# Patient Record
Sex: MALE | Race: WHITE | NOT HISPANIC OR LATINO | Employment: OTHER | URBAN - NONMETROPOLITAN AREA
[De-identification: names, ages, dates, MRNs, and addresses within clinical notes are randomized per-mention and may not be internally consistent; named-entity substitution may affect disease eponyms.]

---

## 2021-01-01 ENCOUNTER — HOSPITAL ENCOUNTER (INPATIENT)
Facility: HOSPITAL | Age: 71
LOS: 2 days | Discharge: HOME | End: 2021-01-03
Attending: EMERGENCY MEDICINE | Admitting: INTERNAL MEDICINE

## 2021-01-01 ENCOUNTER — APPOINTMENT (OUTPATIENT)
Dept: GENERAL RADIOLOGY | Facility: HOSPITAL | Age: 71
End: 2021-01-01

## 2021-01-01 DIAGNOSIS — I21.19 ST ELEVATION MYOCARDIAL INFARCTION (STEMI) INVOLVING OTHER CORONARY ARTERY OF INFERIOR WALL (HCC): Primary | ICD-10-CM

## 2021-01-01 PROBLEM — I21.11 ST ELEVATION MYOCARDIAL INFARCTION INVOLVING RIGHT CORONARY ARTERY: Status: ACTIVE | Noted: 2021-01-01

## 2021-01-01 PROBLEM — I21.9 HEART ATTACK: Status: ACTIVE | Noted: 2021-01-01

## 2021-01-01 LAB
ALBUMIN SERPL-MCNC: 4.2 G/DL (ref 3.5–5.2)
ALBUMIN/GLOB SERPL: 1.3 G/DL
ALP SERPL-CCNC: 56 U/L (ref 39–117)
ALT SERPL W P-5'-P-CCNC: 7 U/L (ref 1–41)
AMPHET+METHAMPHET UR QL: NEGATIVE
AMPHETAMINES UR QL: NEGATIVE
ANION GAP SERPL CALCULATED.3IONS-SCNC: 14 MMOL/L (ref 5–15)
AST SERPL-CCNC: 16 U/L (ref 1–40)
BARBITURATES UR QL SCN: NEGATIVE
BASOPHILS # BLD AUTO: 0.1 10*3/MM3 (ref 0–0.2)
BASOPHILS NFR BLD AUTO: 1.1 % (ref 0–1.5)
BENZODIAZ UR QL SCN: NEGATIVE
BILIRUB SERPL-MCNC: 0.4 MG/DL (ref 0–1.2)
BUN SERPL-MCNC: 18 MG/DL (ref 8–23)
BUN/CREAT SERPL: 15.9 (ref 7–25)
BUPRENORPHINE SERPL-MCNC: NEGATIVE NG/ML
CALCIUM SPEC-SCNC: 9.5 MG/DL (ref 8.6–10.5)
CANNABINOIDS SERPL QL: NEGATIVE
CHLORIDE SERPL-SCNC: 95 MMOL/L (ref 98–107)
CO2 SERPL-SCNC: 20 MMOL/L (ref 22–29)
COCAINE UR QL: NEGATIVE
CREAT SERPL-MCNC: 1.13 MG/DL (ref 0.76–1.27)
DEPRECATED RDW RBC AUTO: 35.4 FL (ref 37–54)
EOSINOPHIL # BLD AUTO: 0.17 10*3/MM3 (ref 0–0.4)
EOSINOPHIL NFR BLD AUTO: 1.9 % (ref 0.3–6.2)
ERYTHROCYTE [DISTWIDTH] IN BLOOD BY AUTOMATED COUNT: 12.6 % (ref 12.3–15.4)
GFR SERPL CREATININE-BSD FRML MDRD: 64 ML/MIN/1.73
GFR SERPL CREATININE-BSD FRML MDRD: 78 ML/MIN/1.73
GLOBULIN UR ELPH-MCNC: 3.2 GM/DL
GLUCOSE BLDC GLUCOMTR-MCNC: 287 MG/DL (ref 70–130)
GLUCOSE BLDC GLUCOMTR-MCNC: 388 MG/DL (ref 70–130)
GLUCOSE BLDC GLUCOMTR-MCNC: 450 MG/DL (ref 70–130)
GLUCOSE BLDC GLUCOMTR-MCNC: 480 MG/DL (ref 70–130)
GLUCOSE P FAST SERPL-MCNC: 484 MG/DL (ref 74–106)
GLUCOSE SERPL-MCNC: 562 MG/DL (ref 65–99)
HCT VFR BLD AUTO: 44 % (ref 37.5–51)
HGB BLD-MCNC: 16.1 G/DL (ref 13–17.7)
HOLD SPECIMEN: NORMAL
HOLD SPECIMEN: NORMAL
IMM GRANULOCYTES # BLD AUTO: 0.09 10*3/MM3 (ref 0–0.05)
IMM GRANULOCYTES NFR BLD AUTO: 1 % (ref 0–0.5)
INR PPP: 0.9 (ref 0.91–1.09)
LYMPHOCYTES # BLD AUTO: 1.68 10*3/MM3 (ref 0.7–3.1)
LYMPHOCYTES NFR BLD AUTO: 19.2 % (ref 19.6–45.3)
MAGNESIUM SERPL-MCNC: 1.9 MG/DL (ref 1.6–2.4)
MCH RBC QN AUTO: 28.8 PG (ref 26.6–33)
MCHC RBC AUTO-ENTMCNC: 36.6 G/DL (ref 31.5–35.7)
MCV RBC AUTO: 78.6 FL (ref 79–97)
METHADONE UR QL SCN: NEGATIVE
MONOCYTES # BLD AUTO: 0.67 10*3/MM3 (ref 0.1–0.9)
MONOCYTES NFR BLD AUTO: 7.7 % (ref 5–12)
NEUTROPHILS NFR BLD AUTO: 6.04 10*3/MM3 (ref 1.7–7)
NEUTROPHILS NFR BLD AUTO: 69.1 % (ref 42.7–76)
NRBC BLD AUTO-RTO: 0 /100 WBC (ref 0–0.2)
OPIATES UR QL: POSITIVE
OXYCODONE UR QL SCN: NEGATIVE
PCP UR QL SCN: NEGATIVE
PLATELET # BLD AUTO: 241 10*3/MM3 (ref 140–450)
PMV BLD AUTO: 9.6 FL (ref 6–12)
POTASSIUM SERPL-SCNC: 4.4 MMOL/L (ref 3.5–5.2)
PROPOXYPH UR QL: NEGATIVE
PROT SERPL-MCNC: 7.4 G/DL (ref 6–8.5)
PROTHROMBIN TIME: 11.7 SECONDS (ref 11.9–14.6)
RBC # BLD AUTO: 5.6 10*6/MM3 (ref 4.14–5.8)
SARS-COV-2 RNA PNL SPEC NAA+PROBE: NOT DETECTED
SODIUM SERPL-SCNC: 129 MMOL/L (ref 136–145)
TRICYCLICS UR QL SCN: NEGATIVE
TROPONIN T SERPL-MCNC: <0.01 NG/ML (ref 0–0.03)
WBC # BLD AUTO: 8.75 10*3/MM3 (ref 3.4–10.8)
WHOLE BLOOD HOLD SPECIMEN: NORMAL
WHOLE BLOOD HOLD SPECIMEN: NORMAL

## 2021-01-01 PROCEDURE — C1725 CATH, TRANSLUMIN NON-LASER: HCPCS | Performed by: INTERNAL MEDICINE

## 2021-01-01 PROCEDURE — 02703ZZ DILATION OF CORONARY ARTERY, ONE ARTERY, PERCUTANEOUS APPROACH: ICD-10-PCS | Performed by: INTERNAL MEDICINE

## 2021-01-01 PROCEDURE — 25010000002 MIDAZOLAM HCL (PF) 5 MG/5ML SOLUTION: Performed by: INTERNAL MEDICINE

## 2021-01-01 PROCEDURE — 63710000001 INSULIN LISPRO (HUMAN) PER 5 UNITS: Performed by: INTERNAL MEDICINE

## 2021-01-01 PROCEDURE — C1769 GUIDE WIRE: HCPCS | Performed by: INTERNAL MEDICINE

## 2021-01-01 PROCEDURE — C1894 INTRO/SHEATH, NON-LASER: HCPCS | Performed by: INTERNAL MEDICINE

## 2021-01-01 PROCEDURE — C1874 STENT, COATED/COV W/DEL SYS: HCPCS | Performed by: INTERNAL MEDICINE

## 2021-01-01 PROCEDURE — C1887 CATHETER, GUIDING: HCPCS | Performed by: INTERNAL MEDICINE

## 2021-01-01 PROCEDURE — 85025 COMPLETE CBC W/AUTO DIFF WBC: CPT | Performed by: EMERGENCY MEDICINE

## 2021-01-01 PROCEDURE — 84484 ASSAY OF TROPONIN QUANT: CPT | Performed by: EMERGENCY MEDICINE

## 2021-01-01 PROCEDURE — 0 IOPAMIDOL PER 1 ML: Performed by: INTERNAL MEDICINE

## 2021-01-01 PROCEDURE — 93005 ELECTROCARDIOGRAM TRACING: CPT | Performed by: INTERNAL MEDICINE

## 2021-01-01 PROCEDURE — 25010000002 HEPARIN (PORCINE) 1000-0.9 UT/500ML-% SOLUTION: Performed by: INTERNAL MEDICINE

## 2021-01-01 PROCEDURE — 93454 CORONARY ARTERY ANGIO S&I: CPT | Performed by: INTERNAL MEDICINE

## 2021-01-01 PROCEDURE — 027034Z DILATION OF CORONARY ARTERY, ONE ARTERY WITH DRUG-ELUTING INTRALUMINAL DEVICE, PERCUTANEOUS APPROACH: ICD-10-PCS | Performed by: INTERNAL MEDICINE

## 2021-01-01 PROCEDURE — 99153 MOD SED SAME PHYS/QHP EA: CPT | Performed by: INTERNAL MEDICINE

## 2021-01-01 PROCEDURE — 99285 EMERGENCY DEPT VISIT HI MDM: CPT

## 2021-01-01 PROCEDURE — 99223 1ST HOSP IP/OBS HIGH 75: CPT | Performed by: INTERNAL MEDICINE

## 2021-01-01 PROCEDURE — 85610 PROTHROMBIN TIME: CPT | Performed by: EMERGENCY MEDICINE

## 2021-01-01 PROCEDURE — 25010000002 FENTANYL CITRATE (PF) 100 MCG/2ML SOLUTION: Performed by: INTERNAL MEDICINE

## 2021-01-01 PROCEDURE — 25010000002 ENOXAPARIN PER 10 MG: Performed by: INTERNAL MEDICINE

## 2021-01-01 PROCEDURE — 80053 COMPREHEN METABOLIC PANEL: CPT | Performed by: EMERGENCY MEDICINE

## 2021-01-01 PROCEDURE — 25010000002 DIPHENHYDRAMINE PER 50 MG: Performed by: INTERNAL MEDICINE

## 2021-01-01 PROCEDURE — 25010000002 HEPARIN (PORCINE) PER 1000 UNITS: Performed by: INTERNAL MEDICINE

## 2021-01-01 PROCEDURE — C9606 PERC D-E COR REVASC W AMI S: HCPCS | Performed by: INTERNAL MEDICINE

## 2021-01-01 PROCEDURE — 25010000002 BIVALIRUDIN TRIFLUOROACETATE 250 MG RECONSTITUTED SOLUTION: Performed by: INTERNAL MEDICINE

## 2021-01-01 PROCEDURE — 4A023N7 MEASUREMENT OF CARDIAC SAMPLING AND PRESSURE, LEFT HEART, PERCUTANEOUS APPROACH: ICD-10-PCS | Performed by: INTERNAL MEDICINE

## 2021-01-01 PROCEDURE — 93005 ELECTROCARDIOGRAM TRACING: CPT | Performed by: EMERGENCY MEDICINE

## 2021-01-01 PROCEDURE — 80306 DRUG TEST PRSMV INSTRMNT: CPT | Performed by: INTERNAL MEDICINE

## 2021-01-01 PROCEDURE — 92941 PRQ TRLML REVSC TOT OCCL AMI: CPT | Performed by: INTERNAL MEDICINE

## 2021-01-01 PROCEDURE — 82962 GLUCOSE BLOOD TEST: CPT

## 2021-01-01 PROCEDURE — 83735 ASSAY OF MAGNESIUM: CPT | Performed by: EMERGENCY MEDICINE

## 2021-01-01 PROCEDURE — 82947 ASSAY GLUCOSE BLOOD QUANT: CPT | Performed by: INTERNAL MEDICINE

## 2021-01-01 PROCEDURE — 99152 MOD SED SAME PHYS/QHP 5/>YRS: CPT | Performed by: INTERNAL MEDICINE

## 2021-01-01 PROCEDURE — 71045 X-RAY EXAM CHEST 1 VIEW: CPT

## 2021-01-01 PROCEDURE — 93010 ELECTROCARDIOGRAM REPORT: CPT | Performed by: INTERNAL MEDICINE

## 2021-01-01 PROCEDURE — 87635 SARS-COV-2 COVID-19 AMP PRB: CPT | Performed by: EMERGENCY MEDICINE

## 2021-01-01 DEVICE — EVEROLIMUS-ELUTING PLATINUM CHROMIUM CORONARY STENT SYSTEM
Type: IMPLANTABLE DEVICE | Site: CORONARY | Status: FUNCTIONAL
Brand: SYNERGY™

## 2021-01-01 DEVICE — EVEROLIMUS-ELUTING PLATINUM CHROMIUM CORONARY STENT SYSTEM
Type: IMPLANTABLE DEVICE | Status: FUNCTIONAL
Brand: SYNERGY™

## 2021-01-01 RX ORDER — ATORVASTATIN CALCIUM 40 MG/1
40 TABLET, FILM COATED ORAL NIGHTLY
Status: DISCONTINUED | OUTPATIENT
Start: 2021-01-01 | End: 2021-01-03 | Stop reason: HOSPADM

## 2021-01-01 RX ORDER — DIPHENHYDRAMINE HYDROCHLORIDE 50 MG/ML
INJECTION INTRAMUSCULAR; INTRAVENOUS AS NEEDED
Status: DISCONTINUED | OUTPATIENT
Start: 2021-01-01 | End: 2021-01-01 | Stop reason: HOSPADM

## 2021-01-01 RX ORDER — FENTANYL CITRATE 50 UG/ML
INJECTION, SOLUTION INTRAMUSCULAR; INTRAVENOUS AS NEEDED
Status: DISCONTINUED | OUTPATIENT
Start: 2021-01-01 | End: 2021-01-01 | Stop reason: HOSPADM

## 2021-01-01 RX ORDER — ONDANSETRON 2 MG/ML
4 INJECTION INTRAMUSCULAR; INTRAVENOUS EVERY 6 HOURS PRN
Status: DISCONTINUED | OUTPATIENT
Start: 2021-01-01 | End: 2021-01-03 | Stop reason: HOSPADM

## 2021-01-01 RX ORDER — SODIUM CHLORIDE 0.9 % (FLUSH) 0.9 %
10 SYRINGE (ML) INJECTION AS NEEDED
Status: DISCONTINUED | OUTPATIENT
Start: 2021-01-01 | End: 2021-01-01 | Stop reason: SDUPTHER

## 2021-01-01 RX ORDER — ONDANSETRON 4 MG/1
4 TABLET, FILM COATED ORAL EVERY 6 HOURS PRN
Status: DISCONTINUED | OUTPATIENT
Start: 2021-01-01 | End: 2021-01-03 | Stop reason: HOSPADM

## 2021-01-01 RX ORDER — HYDROCODONE BITARTRATE AND ACETAMINOPHEN 5; 325 MG/1; MG/1
1 TABLET ORAL EVERY 4 HOURS PRN
Status: DISCONTINUED | OUTPATIENT
Start: 2021-01-01 | End: 2021-01-03 | Stop reason: HOSPADM

## 2021-01-01 RX ORDER — ACETAMINOPHEN 325 MG/1
650 TABLET ORAL EVERY 4 HOURS PRN
Status: DISCONTINUED | OUTPATIENT
Start: 2021-01-01 | End: 2021-01-03 | Stop reason: HOSPADM

## 2021-01-01 RX ORDER — NITROGLYCERIN 0.4 MG/1
0.4 TABLET SUBLINGUAL
Status: DISCONTINUED | OUTPATIENT
Start: 2021-01-01 | End: 2021-01-03 | Stop reason: HOSPADM

## 2021-01-01 RX ORDER — SODIUM CHLORIDE 0.9 % (FLUSH) 0.9 %
10 SYRINGE (ML) INJECTION AS NEEDED
Status: DISCONTINUED | OUTPATIENT
Start: 2021-01-01 | End: 2021-01-03 | Stop reason: HOSPADM

## 2021-01-01 RX ORDER — SODIUM CHLORIDE 0.9 % (FLUSH) 0.9 %
10 SYRINGE (ML) INJECTION EVERY 12 HOURS SCHEDULED
Status: DISCONTINUED | OUTPATIENT
Start: 2021-01-01 | End: 2021-01-02

## 2021-01-01 RX ORDER — MORPHINE SULFATE 2 MG/ML
2 INJECTION, SOLUTION INTRAMUSCULAR; INTRAVENOUS ONCE
Status: DISCONTINUED | OUTPATIENT
Start: 2021-01-01 | End: 2021-01-02

## 2021-01-01 RX ORDER — HEPARIN SODIUM 5000 [USP'U]/ML
4000 INJECTION, SOLUTION INTRAVENOUS; SUBCUTANEOUS ONCE
Status: DISCONTINUED | OUTPATIENT
Start: 2021-01-01 | End: 2021-01-02

## 2021-01-01 RX ORDER — MIDAZOLAM HYDROCHLORIDE 1 MG/ML
INJECTION, SOLUTION INTRAMUSCULAR; INTRAVENOUS AS NEEDED
Status: DISCONTINUED | OUTPATIENT
Start: 2021-01-01 | End: 2021-01-01 | Stop reason: HOSPADM

## 2021-01-01 RX ORDER — LABETALOL HYDROCHLORIDE 5 MG/ML
10 INJECTION, SOLUTION INTRAVENOUS EVERY 6 HOURS PRN
Status: DISCONTINUED | OUTPATIENT
Start: 2021-01-01 | End: 2021-01-03 | Stop reason: HOSPADM

## 2021-01-01 RX ORDER — NICOTINE POLACRILEX 4 MG
15 LOZENGE BUCCAL
Status: DISCONTINUED | OUTPATIENT
Start: 2021-01-01 | End: 2021-01-03 | Stop reason: HOSPADM

## 2021-01-01 RX ORDER — SODIUM CHLORIDE 9 MG/ML
100 INJECTION, SOLUTION INTRAVENOUS CONTINUOUS
Status: DISPENSED | OUTPATIENT
Start: 2021-01-01 | End: 2021-01-01

## 2021-01-01 RX ORDER — HEPARIN SODIUM 200 [USP'U]/100ML
INJECTION, SOLUTION INTRAVENOUS AS NEEDED
Status: DISCONTINUED | OUTPATIENT
Start: 2021-01-01 | End: 2021-01-01 | Stop reason: HOSPADM

## 2021-01-01 RX ORDER — DEXTROSE MONOHYDRATE 25 G/50ML
25 INJECTION, SOLUTION INTRAVENOUS
Status: DISCONTINUED | OUTPATIENT
Start: 2021-01-01 | End: 2021-01-03 | Stop reason: HOSPADM

## 2021-01-01 RX ORDER — ASPIRIN 81 MG/1
81 TABLET ORAL DAILY
Status: DISCONTINUED | OUTPATIENT
Start: 2021-01-02 | End: 2021-01-03 | Stop reason: HOSPADM

## 2021-01-01 RX ORDER — ASPIRIN 81 MG/1
324 TABLET, CHEWABLE ORAL ONCE
Status: DISCONTINUED | OUTPATIENT
Start: 2021-01-01 | End: 2021-01-02

## 2021-01-01 RX ADMIN — INSULIN LISPRO 20 UNITS: 100 INJECTION, SOLUTION INTRAVENOUS; SUBCUTANEOUS at 20:32

## 2021-01-01 RX ADMIN — INSULIN LISPRO 7 UNITS: 100 INJECTION, SOLUTION INTRAVENOUS; SUBCUTANEOUS at 18:42

## 2021-01-01 RX ADMIN — ATORVASTATIN CALCIUM 40 MG: 40 TABLET, FILM COATED ORAL at 20:06

## 2021-01-01 RX ADMIN — METOPROLOL TARTRATE 25 MG: 25 TABLET, FILM COATED ORAL at 20:06

## 2021-01-01 RX ADMIN — ENOXAPARIN SODIUM 40 MG: 100 INJECTION SUBCUTANEOUS at 18:42

## 2021-01-01 RX ADMIN — TICAGRELOR 90 MG: 90 TABLET ORAL at 20:06

## 2021-01-01 NOTE — ED PROVIDER NOTES
Subjective   70-year-old male presents to the ER with complaint of chest pain, EKG transmitted to the hospital by EMS consistent with inferior wall ST elevation MI, STEMI alert called prior to patient arrival.  Patient reports onset of chest pain that began 2 hours prior to arrival.  Associated with nausea, generalized weakness and shortness of breath.  Pain has been continuous since onset.  He has no previous history of coronary disease but has a family history of coronary disease.  Patient is also former smoker.  He did receive full dose aspirin prior to arrival, also received nitroglycerin and morphine prior to arrival with minimal relief.      History provided by:  Patient      Review of Systems   All other systems reviewed and are negative.      No past medical history on file.    No Known Allergies    No past surgical history on file.    Family History   Problem Relation Age of Onset   • Coronary artery disease Mother    • Coronary artery disease Father        Social History     Tobacco Use   • Smoking status: Former Smoker   Substance and Sexual Activity   • Alcohol use: Not Currently   • Drug use: Never           Objective   Physical Exam  Vitals signs and nursing note reviewed.   Constitutional:       Appearance: He is well-developed. He is obese. He is ill-appearing.   HENT:      Head: Normocephalic and atraumatic.   Eyes:      Extraocular Movements: Extraocular movements intact.      Pupils: Pupils are equal, round, and reactive to light.   Cardiovascular:      Rate and Rhythm: Normal rate and regular rhythm.      Heart sounds: Normal heart sounds.   Pulmonary:      Effort: Tachypnea and accessory muscle usage present. No respiratory distress.      Breath sounds: Normal breath sounds.   Abdominal:      General: Bowel sounds are normal.      Palpations: Abdomen is soft.   Musculoskeletal:      Right lower leg: He exhibits no tenderness. No edema.      Left lower leg: He exhibits no tenderness. No edema.    Skin:     General: Skin is warm.      Capillary Refill: Capillary refill takes less than 2 seconds.      Coloration: Skin is pale.   Neurological:      General: No focal deficit present.      Mental Status: He is alert and oriented to person, place, and time.         ECG 12 Lead      Date/Time: 1/1/2021 3:38 PM  Performed by: Dawood Medley MD  Authorized by: Dawood Medley MD   Interpreted by physician  Comparison: compared with previous ECG   Comments: There is significant motion artifact likely from the patient having EKG obtained while in transit in the ambulance, but findings are consistent with inferior ST elevation MI.  Reciprocal depressions aVL and anterior leads.  Abnormal EKG    ECG 12 Lead      Date/Time: 1/1/2021 3:45 PM  Performed by: Dawood Medley MD  Authorized by: Dawood Medley MD   Interpreted by physician  Comparison: compared with previous ECG from 1/1/2021  Similar to previous ECG  Comments: Repeat EKG sent by EMS with less artifact, again findings consistent with inferior ST elevation MI, abnormal EKG    ECG 12 Lead      Date/Time: 1/1/2021 4:01 PM  Performed by: Dawood Medley MD  Authorized by: Dawood Medley MD   Interpreted by physician  Comparison: compared with previous ECG from 1/1/2021  Similar to previous ECG  Comments: EKG obtained when patient arrived to the emergency department.  Findings consistent with inferior ST elevation MI, abnormal EKG    Critical Care  Performed by: Dawood Medley MD  Authorized by: Dawood Medley MD                  ED Course  ED Course as of Jan 01 1618   Fri Jan 01, 2021   1615 70-year-old male with a strong family history of coronary disease and a previous history of smoking presents to the ER via EMS with an inferior ST elevation MI.  STEMI alert was called prior to the patient arrived to emergency department after we receive the initial EKG transmission.  When patient arrived, he was  well-appearing.  Diaphoretic, had ongoing pain.  Repeat EKG confirmed inferior ST elevation MI.  STEMI order set initiated, Covid swab sent and pending.    Heparin was ordered but I believe the patient was carried off to the Cath Lab by the cath team before pharmacy sent the medication down.  Patient had a very brief ER stay.    [AW]      ED Course User Index  [AW] Dawood Medley MD                                           MDM  Number of Diagnoses or Management Options  ST elevation myocardial infarction (STEMI) involving other coronary artery of inferior wall (CMS/HCC): new and requires workup     Amount and/or Complexity of Data Reviewed  Clinical lab tests: ordered  Discuss the patient with other providers: yes    Risk of Complications, Morbidity, and/or Mortality  Presenting problems: high  Diagnostic procedures: high  Management options: high    Critical Care  Total time providing critical care: < 30 minutes    Patient Progress  Patient progress: stable      Final diagnoses:   ST elevation myocardial infarction (STEMI) involving other coronary artery of inferior wall (CMS/HCC)            Dawood Medley MD  01/01/21 8309

## 2021-01-01 NOTE — H&P
Chief Complaint   Patient presents with   • Chest Pain     HPI: This is a 70-year-old male with no prior cardiac history and no past medical history, presenting with acute onset of chest pain.  Patient says that he was watching TV is abnormally developed acute onset substernal chest pain, nonradiating, associated with diaphoresis, nausea, nonradiating from the chest, severe, constant.  He has never had any heart problems before.  He does not take any medications and is not allergic to any medications.  He is a former smoker with a family history of heart disease.    Chest Pain   This is a new problem. The current episode started today. The onset quality is sudden. The problem occurs constantly. The problem has been unchanged. The pain is present in the substernal region. The pain is severe. The quality of the pain is described as pressure and heavy. The pain does not radiate. Associated symptoms include diaphoresis, malaise/fatigue, nausea and vomiting. Pertinent negatives include no abdominal pain, back pain, claudication, cough, dizziness, fever, headaches, hemoptysis, irregular heartbeat, numbness, orthopnea, palpitations, PND, shortness of breath or syncope. The pain is aggravated by nothing. He has tried nothing for the symptoms. Risk factors include male gender.   Pertinent negatives for past medical history include no CAD, no hyperlipidemia and no hypertension.     History reviewed. No pertinent past medical history. Denies any medical issues.    History reviewed. No pertinent surgical history.  Denies any past surgical history    Home Medications:  None    No Known Allergies    Social History     Tobacco Use   • Smoking status: Former Smoker   Substance Use Topics   • Alcohol use: Not Currently     Family History   Problem Relation Age of Onset   • Coronary artery disease Mother    • Coronary artery disease Father        Review of Systems   Constitution: Positive for diaphoresis and malaise/fatigue.  "Negative for chills, fever, night sweats and weight loss.   HENT: Negative for congestion and hearing loss.    Eyes: Negative for blurred vision and pain.   Cardiovascular: Positive for chest pain. Negative for claudication, dyspnea on exertion, irregular heartbeat, leg swelling, orthopnea, palpitations, paroxysmal nocturnal dyspnea and syncope.   Respiratory: Negative for cough, hemoptysis, shortness of breath and wheezing.    Endocrine: Negative for cold intolerance and heat intolerance.   Hematologic/Lymphatic: Negative for adenopathy and bleeding problem. Does not bruise/bleed easily.   Skin: Negative for color change, poor wound healing and rash.   Musculoskeletal: Negative for arthritis, back pain, myalgias and neck pain.   Gastrointestinal: Positive for nausea and vomiting. Negative for abdominal pain, change in bowel habit and heartburn.   Genitourinary: Negative for dysuria, frequency, hematuria and nocturia.   Neurological: Negative for dizziness, focal weakness, headaches, light-headedness, loss of balance and numbness.   Psychiatric/Behavioral: Negative for altered mental status, memory loss and substance abuse.   Allergic/Immunologic: Negative for hives and persistent infections.       Physical Exam:    /97   Pulse 84   Resp 22   Ht 175.9 cm (69.25\")   Wt 92.1 kg (203 lb)   SpO2 99%   BMI 29.76 kg/m²   Heart Rate:  [84-88] 84  Resp:  [18-22] 22  BP: (141-155)/(67-97) 141/97    Vitals signs reviewed.   Constitutional:       General: Awake. In acute distress.      Appearance: Normal appearance. Well-developed. Acutely ill-appearing. Not toxic-appearing or diaphoretic.      Interventions: Nasal cannula in place.   Eyes:      General: Lids are normal.      Extraocular Movements: Extraocular movements intact.      Pupils: Pupils are equal, round, and reactive to light.   HENT:      Head: Normocephalic and atraumatic.      Right Ear: External ear normal.      Left Ear: External ear normal.      " Nose: Nose normal.    Mouth/Throat:      Mouth: Mucous membranes are not pale, dry and not cyanotic.      Dentition: Abnormal dentition.      Pharynx: Uvula midline. No oropharyngeal exudate.   Neck:      Musculoskeletal: Normal range of motion and neck supple. No edema.      Thyroid: No thyroid mass or thyromegaly.      Vascular: No carotid bruit, hepatojugular reflux or JVD.      Trachea: No tracheal deviation.      Lymphadenopathy: No cervical adenopathy.   Pulmonary:      Effort: Pulmonary effort is normal. No accessory muscle usage or respiratory distress.      Breath sounds: Normal breath sounds. No wheezing. No rhonchi. No rales.   Chest:      Chest wall: Not tender to palpatation.   Cardiovascular:      Normal rate. Regular rhythm.      Murmurs: There is no murmur.      No gallop.   Pulses:     Intact distal pulses.   Edema:     Peripheral edema absent.   Abdominal:      General: Bowel sounds are normal. There is no distension or abdominal bruit.      Palpations: Abdomen is soft. There is no pulsatile midline mass.      Tenderness: There is no abdominal tenderness.   Musculoskeletal: Normal range of motion.         General: No tenderness or deformity.      Extremities: No clubbing present.  Skin:     General: Skin is warm and dry. There is no cyanosis.      Findings: No erythema or rash.   Neurological:      General: No focal deficit present.      Mental Status: Alert, oriented to person, place, and time and oriented to person, place and time.      Cranial Nerves: No cranial nerve deficit.   Psychiatric:         Attention and Perception: Attention normal.         Mood and Affect: Mood normal.         Speech: Speech normal.         Behavior: Behavior normal.         Thought Content: Thought content normal.       Diagnostic Data:    ECG: sinus rhythm with inferior ST elevation    Lab Results   Component Value Date    WBC 8.75 01/01/2021    HGB 16.1 01/01/2021    HCT 44.0 01/01/2021    MCV 78.6 (L) 01/01/2021      01/01/2021     No results found for: GLUCOSE, CALCIUM, NA, K, CO2, CL, BUN, CREATININE, EGFRIFAFRI, EGFRIFNONA, BCR, ANIONGAP    Lab Results   Component Value Date    INR 0.90 (L) 01/01/2021    PROTIME 11.7 (L) 01/01/2021     ASSESSMENT/PLAN:    1. Acute inferior STEMI    Given this patient's presentation with inferior ST elevation or ongoing chest pain, we will plan for emergency cardiac catheterization.  I have explained the procedure, risk, benefits and alternatives the patient is agreeable to proceed.  Further plans will be pending the results of the patient's cardiac catheterization.

## 2021-01-02 ENCOUNTER — APPOINTMENT (OUTPATIENT)
Dept: CARDIOLOGY | Facility: HOSPITAL | Age: 71
End: 2021-01-02

## 2021-01-02 PROBLEM — E78.1 HYPERTRIGLYCERIDEMIA: Status: ACTIVE | Noted: 2021-01-02

## 2021-01-02 PROBLEM — I25.10 CORONARY ARTERY DISEASE INVOLVING NATIVE CORONARY ARTERY OF NATIVE HEART: Status: ACTIVE | Noted: 2021-01-02

## 2021-01-02 PROBLEM — E66.9 OBESITY (BMI 30-39.9): Status: ACTIVE | Noted: 2021-01-02

## 2021-01-02 PROBLEM — I21.9 AMI (ACUTE MYOCARDIAL INFARCTION): Status: ACTIVE | Noted: 2021-01-02

## 2021-01-02 PROBLEM — E11.65 TYPE 2 DIABETES MELLITUS WITH HYPERGLYCEMIA, WITHOUT LONG-TERM CURRENT USE OF INSULIN: Status: ACTIVE | Noted: 2021-01-02

## 2021-01-02 LAB
ALBUMIN SERPL-MCNC: 3.9 G/DL (ref 3.5–5.2)
ALBUMIN/GLOB SERPL: 1.3 G/DL
ALP SERPL-CCNC: 44 U/L (ref 39–117)
ALT SERPL W P-5'-P-CCNC: 13 U/L (ref 1–41)
ANION GAP SERPL CALCULATED.3IONS-SCNC: 11 MMOL/L (ref 5–15)
AST SERPL-CCNC: 81 U/L (ref 1–40)
BH CV ECHO MEAS - AO MAX PG (FULL): 1.9 MMHG
BH CV ECHO MEAS - AO MAX PG: 8.1 MMHG
BH CV ECHO MEAS - AO MEAN PG (FULL): 1 MMHG
BH CV ECHO MEAS - AO MEAN PG: 4 MMHG
BH CV ECHO MEAS - AO ROOT AREA (BSA CORRECTED): 1.7
BH CV ECHO MEAS - AO ROOT AREA: 9.1 CM^2
BH CV ECHO MEAS - AO ROOT DIAM: 3.4 CM
BH CV ECHO MEAS - AO V2 MAX: 142 CM/SEC
BH CV ECHO MEAS - AO V2 MEAN: 96.2 CM/SEC
BH CV ECHO MEAS - AO V2 VTI: 29.8 CM
BH CV ECHO MEAS - AVA(I,A): 3.5 CM^2
BH CV ECHO MEAS - AVA(I,D): 3.5 CM^2
BH CV ECHO MEAS - AVA(V,A): 3.6 CM^2
BH CV ECHO MEAS - AVA(V,D): 3.6 CM^2
BH CV ECHO MEAS - BSA(HAYCOCK): 2.1 M^2
BH CV ECHO MEAS - BSA: 2.1 M^2
BH CV ECHO MEAS - BZI_BMI: 29.2 KILOGRAMS/M^2
BH CV ECHO MEAS - BZI_METRIC_HEIGHT: 175.3 CM
BH CV ECHO MEAS - BZI_METRIC_WEIGHT: 89.8 KG
BH CV ECHO MEAS - EDV(CUBED): 99.9 ML
BH CV ECHO MEAS - EDV(MOD-SP4): 57.5 ML
BH CV ECHO MEAS - EDV(TEICH): 99.3 ML
BH CV ECHO MEAS - EF(CUBED): 90.2 %
BH CV ECHO MEAS - EF(MOD-SP4): 62.3 %
BH CV ECHO MEAS - EF(TEICH): 84.8 %
BH CV ECHO MEAS - ESV(CUBED): 9.8 ML
BH CV ECHO MEAS - ESV(MOD-SP4): 21.7 ML
BH CV ECHO MEAS - ESV(TEICH): 15.1 ML
BH CV ECHO MEAS - FS: 53.9 %
BH CV ECHO MEAS - IVS/LVPW: 0.84
BH CV ECHO MEAS - IVSD: 1.1 CM
BH CV ECHO MEAS - LA DIMENSION: 3.5 CM
BH CV ECHO MEAS - LA/AO: 1
BH CV ECHO MEAS - LAT PEAK E' VEL: 10.1 CM/SEC
BH CV ECHO MEAS - LV DIASTOLIC VOL/BSA (35-75): 28 ML/M^2
BH CV ECHO MEAS - LV MASS(C)D: 200.4 GRAMS
BH CV ECHO MEAS - LV MASS(C)DI: 97.4 GRAMS/M^2
BH CV ECHO MEAS - LV MAX PG: 6.2 MMHG
BH CV ECHO MEAS - LV MEAN PG: 3 MMHG
BH CV ECHO MEAS - LV SYSTOLIC VOL/BSA (12-30): 10.6 ML/M^2
BH CV ECHO MEAS - LV V1 MAX: 124 CM/SEC
BH CV ECHO MEAS - LV V1 MEAN: 76.7 CM/SEC
BH CV ECHO MEAS - LV V1 VTI: 24.9 CM
BH CV ECHO MEAS - LVIDD: 4.6 CM
BH CV ECHO MEAS - LVIDS: 2.1 CM
BH CV ECHO MEAS - LVLD AP4: 7.1 CM
BH CV ECHO MEAS - LVLS AP4: 5.4 CM
BH CV ECHO MEAS - LVOT AREA (M): 4.2 CM^2
BH CV ECHO MEAS - LVOT AREA: 4.2 CM^2
BH CV ECHO MEAS - LVOT DIAM: 2.3 CM
BH CV ECHO MEAS - LVPWD: 1.3 CM
BH CV ECHO MEAS - MED PEAK E' VEL: 4.9 CM/SEC
BH CV ECHO MEAS - MV A MAX VEL: 95.1 CM/SEC
BH CV ECHO MEAS - MV DEC TIME: 0.2 SEC
BH CV ECHO MEAS - MV E MAX VEL: 81.5 CM/SEC
BH CV ECHO MEAS - MV E/A: 0.86
BH CV ECHO MEAS - RAP SYSTOLE: 5 MMHG
BH CV ECHO MEAS - RVSP: 10.6 MMHG
BH CV ECHO MEAS - SI(AO): 131.5 ML/M^2
BH CV ECHO MEAS - SI(CUBED): 43.8 ML/M^2
BH CV ECHO MEAS - SI(LVOT): 50.3 ML/M^2
BH CV ECHO MEAS - SI(MOD-SP4): 17.4 ML/M^2
BH CV ECHO MEAS - SI(TEICH): 40.9 ML/M^2
BH CV ECHO MEAS - SV(AO): 270.6 ML
BH CV ECHO MEAS - SV(CUBED): 90.1 ML
BH CV ECHO MEAS - SV(LVOT): 103.5 ML
BH CV ECHO MEAS - SV(MOD-SP4): 35.8 ML
BH CV ECHO MEAS - SV(TEICH): 84.2 ML
BH CV ECHO MEAS - TR MAX VEL: 118 CM/SEC
BH CV ECHO MEASUREMENTS AVERAGE E/E' RATIO: 10.87
BILIRUB SERPL-MCNC: 0.2 MG/DL (ref 0–1.2)
BUN SERPL-MCNC: 19 MG/DL (ref 8–23)
BUN/CREAT SERPL: 21.3 (ref 7–25)
CALCIUM SPEC-SCNC: 9.1 MG/DL (ref 8.6–10.5)
CHLORIDE SERPL-SCNC: 99 MMOL/L (ref 98–107)
CHOLEST SERPL-MCNC: 151 MG/DL (ref 0–200)
CO2 SERPL-SCNC: 26 MMOL/L (ref 22–29)
CREAT SERPL-MCNC: 0.89 MG/DL (ref 0.76–1.27)
DEPRECATED RDW RBC AUTO: 37.2 FL (ref 37–54)
ERYTHROCYTE [DISTWIDTH] IN BLOOD BY AUTOMATED COUNT: 12.9 % (ref 12.3–15.4)
GFR SERPL CREATININE-BSD FRML MDRD: 85 ML/MIN/1.73
GLOBULIN UR ELPH-MCNC: 3 GM/DL
GLUCOSE BLDC GLUCOMTR-MCNC: 229 MG/DL (ref 70–130)
GLUCOSE BLDC GLUCOMTR-MCNC: 309 MG/DL (ref 70–130)
GLUCOSE BLDC GLUCOMTR-MCNC: 354 MG/DL (ref 70–130)
GLUCOSE SERPL-MCNC: 296 MG/DL (ref 65–99)
HBA1C MFR BLD: 14.1 % (ref 4.8–5.6)
HCT VFR BLD AUTO: 42.3 % (ref 37.5–51)
HDLC SERPL-MCNC: 45 MG/DL (ref 40–60)
HGB BLD-MCNC: 15.1 G/DL (ref 13–17.7)
LDLC SERPL CALC-MCNC: 72 MG/DL (ref 0–100)
LDLC/HDLC SERPL: 1.44 {RATIO}
LEFT ATRIUM VOLUME INDEX: 19.2 ML/M2
LEFT ATRIUM VOLUME: 39.5 CM3
MCH RBC QN AUTO: 28.9 PG (ref 26.6–33)
MCHC RBC AUTO-ENTMCNC: 35.7 G/DL (ref 31.5–35.7)
MCV RBC AUTO: 81 FL (ref 79–97)
PLATELET # BLD AUTO: 243 10*3/MM3 (ref 140–450)
PMV BLD AUTO: 9.8 FL (ref 6–12)
POTASSIUM SERPL-SCNC: 4.1 MMOL/L (ref 3.5–5.2)
PROT SERPL-MCNC: 6.9 G/DL (ref 6–8.5)
QT INTERVAL: 378 MS
QT INTERVAL: 388 MS
QTC INTERVAL: 419 MS
QTC INTERVAL: 444 MS
RBC # BLD AUTO: 5.22 10*6/MM3 (ref 4.14–5.8)
SODIUM SERPL-SCNC: 136 MMOL/L (ref 136–145)
TRIGL SERPL-MCNC: 205 MG/DL (ref 0–150)
TSH SERPL DL<=0.05 MIU/L-ACNC: 1.48 UIU/ML (ref 0.27–4.2)
VLDLC SERPL-MCNC: 34 MG/DL (ref 5–40)
WBC # BLD AUTO: 12.13 10*3/MM3 (ref 3.4–10.8)

## 2021-01-02 PROCEDURE — 93306 TTE W/DOPPLER COMPLETE: CPT | Performed by: INTERNAL MEDICINE

## 2021-01-02 PROCEDURE — 84443 ASSAY THYROID STIM HORMONE: CPT | Performed by: INTERNAL MEDICINE

## 2021-01-02 PROCEDURE — 94799 UNLISTED PULMONARY SVC/PX: CPT

## 2021-01-02 PROCEDURE — 80053 COMPREHEN METABOLIC PANEL: CPT | Performed by: INTERNAL MEDICINE

## 2021-01-02 PROCEDURE — 85027 COMPLETE CBC AUTOMATED: CPT | Performed by: INTERNAL MEDICINE

## 2021-01-02 PROCEDURE — 82962 GLUCOSE BLOOD TEST: CPT

## 2021-01-02 PROCEDURE — 63710000001 INSULIN LISPRO (HUMAN) PER 5 UNITS: Performed by: INTERNAL MEDICINE

## 2021-01-02 PROCEDURE — 94640 AIRWAY INHALATION TREATMENT: CPT

## 2021-01-02 PROCEDURE — 99232 SBSQ HOSP IP/OBS MODERATE 35: CPT | Performed by: INTERNAL MEDICINE

## 2021-01-02 PROCEDURE — 80061 LIPID PANEL: CPT | Performed by: INTERNAL MEDICINE

## 2021-01-02 PROCEDURE — 93306 TTE W/DOPPLER COMPLETE: CPT

## 2021-01-02 PROCEDURE — 83036 HEMOGLOBIN GLYCOSYLATED A1C: CPT | Performed by: INTERNAL MEDICINE

## 2021-01-02 PROCEDURE — 25010000002 ENOXAPARIN PER 10 MG: Performed by: INTERNAL MEDICINE

## 2021-01-02 PROCEDURE — 63710000001 INSULIN DETEMIR PER 5 UNITS: Performed by: INTERNAL MEDICINE

## 2021-01-02 RX ORDER — LISINOPRIL 5 MG/1
5 TABLET ORAL
Status: DISCONTINUED | OUTPATIENT
Start: 2021-01-02 | End: 2021-01-03 | Stop reason: HOSPADM

## 2021-01-02 RX ORDER — NICOTINE POLACRILEX 4 MG
15 LOZENGE BUCCAL
Status: DISCONTINUED | OUTPATIENT
Start: 2021-01-02 | End: 2021-01-02 | Stop reason: SDUPTHER

## 2021-01-02 RX ORDER — IPRATROPIUM BROMIDE AND ALBUTEROL SULFATE 2.5; .5 MG/3ML; MG/3ML
3 SOLUTION RESPIRATORY (INHALATION) EVERY 4 HOURS PRN
Status: DISCONTINUED | OUTPATIENT
Start: 2021-01-02 | End: 2021-01-03 | Stop reason: HOSPADM

## 2021-01-02 RX ORDER — DEXTROSE MONOHYDRATE 25 G/50ML
25 INJECTION, SOLUTION INTRAVENOUS
Status: DISCONTINUED | OUTPATIENT
Start: 2021-01-02 | End: 2021-01-02 | Stop reason: SDUPTHER

## 2021-01-02 RX ORDER — IPRATROPIUM BROMIDE AND ALBUTEROL SULFATE 2.5; .5 MG/3ML; MG/3ML
3 SOLUTION RESPIRATORY (INHALATION)
Status: DISCONTINUED | OUTPATIENT
Start: 2021-01-02 | End: 2021-01-02

## 2021-01-02 RX ADMIN — METOPROLOL TARTRATE 25 MG: 25 TABLET, FILM COATED ORAL at 09:23

## 2021-01-02 RX ADMIN — TICAGRELOR 90 MG: 90 TABLET ORAL at 20:39

## 2021-01-02 RX ADMIN — METOPROLOL TARTRATE 25 MG: 25 TABLET, FILM COATED ORAL at 21:06

## 2021-01-02 RX ADMIN — ASPIRIN 81 MG: 81 TABLET, FILM COATED ORAL at 09:23

## 2021-01-02 RX ADMIN — INSULIN LISPRO 5 UNITS: 100 INJECTION, SOLUTION INTRAVENOUS; SUBCUTANEOUS at 17:38

## 2021-01-02 RX ADMIN — IPRATROPIUM BROMIDE AND ALBUTEROL SULFATE 3 ML: 2.5; .5 SOLUTION RESPIRATORY (INHALATION) at 10:01

## 2021-01-02 RX ADMIN — INSULIN DETEMIR 20 UNITS: 100 INJECTION, SOLUTION SUBCUTANEOUS at 20:38

## 2021-01-02 RX ADMIN — LISINOPRIL 5 MG: 5 TABLET ORAL at 09:23

## 2021-01-02 RX ADMIN — TICAGRELOR 90 MG: 90 TABLET ORAL at 09:23

## 2021-01-02 RX ADMIN — ATORVASTATIN CALCIUM 40 MG: 40 TABLET, FILM COATED ORAL at 20:39

## 2021-01-02 RX ADMIN — ENOXAPARIN SODIUM 40 MG: 100 INJECTION SUBCUTANEOUS at 20:39

## 2021-01-02 RX ADMIN — INSULIN LISPRO 6 UNITS: 100 INJECTION, SOLUTION INTRAVENOUS; SUBCUTANEOUS at 09:23

## 2021-01-02 RX ADMIN — INSULIN LISPRO 10 UNITS: 100 INJECTION, SOLUTION INTRAVENOUS; SUBCUTANEOUS at 11:23

## 2021-01-02 NOTE — CONSULTS
"    [facility]  HISTORY AND PHYSICAL    Date of Admission: 1/1/2021  Primary Care Physician: Provider, No Known    Subjective     Chief Complaint: The patient was admitted by Dr. Fitch for STEMI, hospitalist has been consulted for glucose control    History of Present Illness  70-year-old male who was admitted earlier in the day with chest pain.  The patient did not have history of cardiac disease.  He tells me that he thought his glucose was controlled, but he thinks that the holidays got away from him.  Patient tells me on exam, that his chest pain is improved.  His O2 is set slightly diminished at 90% on room air.  He denies any bowel or kidney dysfunction, and states that his kidneys have been working quite well since he got to the hospital.  He has no swelling in his lower extremities.        Review of Systems   Chest pain    Otherwise complete ROS reviewed and negative except as mentioned in the HPI.    Past Medical History: History reviewed. No pertinent past medical history.     Past Surgical History:History reviewed. No pertinent surgical history.     Social History:  reports that he has quit smoking. He does not have any smokeless tobacco history on file. He reports previous alcohol use. He reports that he does not use drugs.    Family History: family history includes Coronary artery disease in his father and mother.       Allergies:  No Known Allergies  Medications:  Prior to Admission medications    Not on File     Objective     Vital Signs: /100   Pulse 90   Temp 97.8 °F (36.6 °C) (Axillary)   Resp 12   Ht 175.9 cm (69.25\")   Wt 99.8 kg (220 lb)   SpO2 92%   BMI 32.25 kg/m²   Physical Exam  Vitals signs reviewed.   Constitutional:       Appearance: Normal appearance.   HENT:      Head: Normocephalic and atraumatic.      Nose: Nose normal. No rhinorrhea.   Eyes:      General: No scleral icterus.     Conjunctiva/sclera: Conjunctivae " normal.   Neck:      Musculoskeletal: Normal range of motion and neck supple.   Cardiovascular:      Rate and Rhythm: Normal rate and regular rhythm.      Heart sounds: Normal heart sounds.   Pulmonary:      Effort: Pulmonary effort is normal.      Breath sounds: Normal breath sounds.   Abdominal:      General: Bowel sounds are normal.      Palpations: Abdomen is soft.   Musculoskeletal:         General: No swelling or tenderness.   Skin:     General: Skin is warm and dry.   Neurological:      General: No focal deficit present.      Mental Status: He is alert.      Cranial Nerves: No cranial nerve deficit.   Psychiatric:         Mood and Affect: Mood normal.         Behavior: Behavior normal.        Results Reviewed:  Lab Results (last 24 hours)     Procedure Component Value Units Date/Time    Glucose, Fasting [[id number]]  (Abnormal) Collected: 01/01/21 1928    Specimen: Blood Updated: 01/01/21 2003     Glucose, Fasting 484 mg/dL     POC Glucose Once [[id number]]  (Abnormal) Collected: 01/01/21 1836    Specimen: Blood Updated: 01/01/21 1848     Glucose 450 mg/dL      Comment: : [id number] Ardon RebeccaMeter ID: [id number]       POC Glucose Once [[id number]]  (Abnormal) Collected: 01/01/21 1835    Specimen: Blood Updated: 01/01/21 1848     Glucose 480 mg/dL      Comment: : [id number] Ardon RebeccaMeter ID: [id number]       Magnesium [[id number]]  (Normal) Collected: 01/01/21 1606    Specimen: Blood Updated: 01/01/21 1753     Magnesium 1.9 mg/dL     Emmett Draw [[id number]] Collected: 01/01/21 1606    Specimen: Blood Updated: 01/01/21 1715    Narrative:      The following orders were created for panel order Emmett Draw.  Procedure                               Abnormality         Status                     ---------                               -----------         ------                     Light Blue Top[[id number]]                                   Final result               Green Top (Gel)[[id number]]                                   Final result               Lavender Top[[id number]]                                     Final result               Red Top[[id number]]                                          Final result                 Please view results for these tests on the individual orders.    Light Blue Top [[id number]] Collected: 01/01/21 1606    Specimen: Blood Updated: 01/01/21 1715     Extra Tube hold for add-on     Comment: Auto resulted       Green Top (Gel) [[id number]] Collected: 01/01/21 1606    Specimen: Blood Updated: 01/01/21 1715     Extra Tube Hold for add-ons.     Comment: Auto resulted.       Lavender Top [[id number]] Collected: 01/01/21 1606    Specimen: Blood Updated: 01/01/21 1715     Extra Tube hold for add-on     Comment: Auto resulted       Red Top [[id number]] Collected: 01/01/21 1606    Specimen: Blood Updated: 01/01/21 1715     Extra Tube Hold for add-ons.     Comment: Auto resulted.       Comprehensive Metabolic Panel [[id number]]  (Abnormal) Collected: 01/01/21 1606    Specimen: Blood Updated: 01/01/21 1654     Glucose 562 mg/dL      BUN 18 mg/dL      Creatinine 1.13 mg/dL      Sodium 129 mmol/L      Potassium 4.4 mmol/L      Comment: Slight hemolysis detected by analyzer. Results may be affected.        Chloride 95 mmol/L      CO2 20.0 mmol/L      Calcium 9.5 mg/dL      Total Protein 7.4 g/dL      Albumin 4.20 g/dL      ALT (SGPT) 7 U/L      AST (SGOT) 16 U/L      Comment: Slight hemolysis detected by analyzer. Results may be affected.        Alkaline Phosphatase 56 U/L      Total Bilirubin 0.4 mg/dL      eGFR Non African Amer 64 mL/min/1.73      eGFR  African Amer 78 mL/min/1.73      Globulin 3.2 gm/dL      A/G Ratio 1.3 g/dL      BUN/Creatinine Ratio 15.9     Anion Gap 14.0 mmol/L     Narrative:      GFR Normal >60  Chronic Kidney Disease <60  Kidney Failure <15      COVID PRE-OP / PRE-PROCEDURE SCREENING ORDER (NO ISOLATION) - Swab, Nasal Cavity [[id number]]  (Normal) Collected: 01/01/21  1607    Specimen: Swab from Nasal Cavity Updated: 01/01/21 1651    Narrative:      The following orders were created for panel order COVID PRE-OP / PRE-PROCEDURE SCREENING ORDER (NO ISOLATION) - Swab, Nasal Cavity.  Procedure                               Abnormality         Status                     ---------                               -----------         ------                     COVID-19,[employer] [facility]...[[id number]]  Normal              Final result                 Please view results for these tests on the individual orders.    COVID-19,[facility] IN-HOUSE,Nasal Swab No Transport Media 3-4 HR TAT - Swab, Nasal Cavity [[id number]]  (Normal) Collected: 01/01/21 1607    Specimen: Swab from Nasal Cavity Updated: 01/01/21 1651     COVID19 Not Detected    Narrative:      Fact sheet for providers: https://www.fda.gov/media/196960/download     Fact sheet for patients: https://www.fda.gov/media/689016/download    Test performed by PCR.    Troponin [[id number]]  (Normal) Collected: 01/01/21 1606    Specimen: Blood Updated: 01/01/21 1629     Troponin T <0.010 ng/mL     Narrative:      Troponin T Reference Range:  <= 0.03 ng/mL-   Negative for AMI  >0.03 ng/mL-     Abnormal for myocardial necrosis.  Clinicians would have to utilize clinical acumen, EKG, Troponin and serial changes to determine if it is an Acute Myocardial Infarction or myocardial injury due to an underlying chronic condition.       Results may be falsely decreased if patient taking Biotin.      Protime-INR [[id number]]  (Abnormal) Collected: 01/01/21 1606    Specimen: Blood Updated: 01/01/21 1619     Protime 11.7 Seconds      INR 0.90    CBC & Differential [[id number]]  (Abnormal) Collected: 01/01/21 1606    Specimen: Blood Updated: 01/01/21 1613    Narrative:      The following orders were created for panel order CBC & Differential.  Procedure                               Abnormality         Status                     ---------                                -----------         ------                     CBC Auto Differential[[id number]]        Abnormal            Final result                 Please view results for these tests on the individual orders.    CBC Auto Differential [[id number]]  (Abnormal) Collected: 01/01/21 1606    Specimen: Blood Updated: 01/01/21 1613     WBC 8.75 10*3/mm3      RBC 5.60 10*6/mm3      Hemoglobin 16.1 g/dL      Hematocrit 44.0 %      MCV 78.6 fL      MCH 28.8 pg      MCHC 36.6 g/dL      RDW 12.6 %      RDW-SD 35.4 fl      MPV 9.6 fL      Platelets 241 10*3/mm3      Neutrophil % 69.1 %      Lymphocyte % 19.2 %      Monocyte % 7.7 %      Eosinophil % 1.9 %      Basophil % 1.1 %      Immature Grans % 1.0 %      Neutrophils, Absolute 6.04 10*3/mm3      Lymphocytes, Absolute 1.68 10*3/mm3      Monocytes, Absolute 0.67 10*3/mm3      Eosinophils, Absolute 0.17 10*3/mm3      Basophils, Absolute 0.10 10*3/mm3      Immature Grans, Absolute 0.09 10*3/mm3      nRBC 0.0 /100 WBC         Imaging Results (Last 24 Hours)     Procedure Component Value Units Date/Time    XR Chest 1 View [[id number]] Collected: 01/01/21 1621     Updated: 01/01/21 1625    Narrative:      EXAMINATION: XR CHEST 1 VW-. 1/1/2021 4:21 PM CST     CHEST, ONE VIEW:     HISTORY: Acute STEMI     COMPARISON: None     A single frontal chest radiograph was obtained.     FINDINGS:     The level inspiration is relatively shallow and lung volumes diminished.     There is mild crowding of bronchovascular structures show markings  without definite parenchymal infiltrates.     The heart is within the upper limits of normal in size without evidence  of overt heart failure.     The bony structures are intact.                                     Impression:      1. Shallow inspiration with diminished lung volumes, no definite acute  cardiopulmonary process.     This report was finalized on 01/01/2021 16:22 by Dr. Vasu Ibarra MD.        I have personally reviewed and interpreted the radiology  studies and ECG obtained at time of admission.     Assessment / Plan     Assessment:   Active Hospital Problems    Diagnosis   • ST elevation myocardial infarction involving right coronary artery (CMS/HCC)   • Heart attack (CMS/HCC)     Impression:  1.  Hyperglycemia   2.  Reactive hyponatremia  3.  STEMI, status post stent placement  4.  Family history of heart disease    Plan:   1.  A1c in the morning  2.  Sliding scale insulin with Accu-Cheks   3.  Labs in the morning   4.  Adjust home medications according to A1c      Code Status: Full     I discussed the patient's findings and my recommendations with the patient    Estimated length of stay 2 to 3 days    Patient seen and examined by me on 1/1/2021    Electronically signed by Romero Tena DO, 01/01/21, 20:59 CST.

## 2021-01-02 NOTE — PLAN OF CARE
Goal Outcome Evaluation:  Plan of Care Reviewed With: patient  Progress: improving  Outcome Summary: Pt transferred from CCU to  today. R radial CDI. VSS. Echo done, results pending. Denies pain. Safety maintained.

## 2021-01-02 NOTE — PLAN OF CARE
Goal Outcome Evaluation:  Plan of Care Reviewed With: patient  Progress: no change  Outcome Summary: Pt with new dx of DM2. HbA1c is 14.1. Pt was not in his room at time of RD visit. Left DM diet handouts on bedside table. Will follow up with pt to provide diet education and answer questions. He is ordered a C.CHO, cardiac diet. He has consumed 50% of 1 meal. No weight hx available to determine weight loss. Current weights vary and may be inaccurate. Will monitor and instruct as appropriate.

## 2021-01-02 NOTE — PLAN OF CARE
Goal Outcome Evaluation:     Progress: improving   Pt admitted on 1/1 with STEMI. HC done to R radial w/ 2 stents places. Cath site CDI, TR band removed @ 0330 with no drainage. Pt had no c/o chest pain during night. O2 at 2L NC sats WNL. Pulses good. Safety maintained, will cont to monitor and contact MD with any questions.

## 2021-01-02 NOTE — PROGRESS NOTES
[facility] Medicine Services  INPATIENT PROGRESS NOTE    Patient Name: Aren Beltrán  Date of Admission: 1/1/2021  Today's Date: 01/02/21  Length of Stay: 1  Primary Care Physician: Provider, No Known    Subjective   Chief Complaint: chest pain   HPI     Patient seen and examined at bedside.      Patient new diagnosis of diabetes.  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  Patient will need a primary care physician as well as significant insulin teaching.      Patient had bilateral wheezing on examination, recommended nursing give incentive/flutter and requested a breahting treatment from the respiratory therapist.      Review of Systems   Constitutional: Positive for fatigue. Negative for activity change, chills and fever.   Respiratory: Positive for wheezing. Negative for cough and shortness of breath.    Cardiovascular: Negative for chest pain and palpitations.   Gastrointestinal: Negative for abdominal distention and abdominal pain.   Neurological: Negative for weakness.        All pertinent negatives and positives are as above. All other systems have been reviewed and are negative unless otherwise stated.     Objective    Temp:  [96 °F (35.6 °C)-97.9 °F (36.6 °C)] 97.3 °F (36.3 °C)  Heart Rate:  [57-96] 66  Resp:  [12-22] 15  BP: (126-167)/() 134/71  Physical Exam  Vitals signs and nursing note reviewed.   Constitutional:       Appearance: He is obese.   HENT:      Head: Normocephalic and atraumatic.      Mouth/Throat:      Mouth: Mucous membranes are moist.   Eyes:      General: No scleral icterus.     Conjunctiva/sclera: Conjunctivae normal.   Cardiovascular:      Rate and Rhythm: Normal rate and regular rhythm.      Pulses: Normal pulses.   Pulmonary:      Effort: Pulmonary effort is normal. No respiratory distress.      Breath sounds: No stridor. Wheezing present. No rhonchi or  rales.   Abdominal:      General: Abdomen is flat. Bowel sounds are normal. There is no distension.      Palpations: Abdomen is soft. There is no mass.      Tenderness: There is no abdominal tenderness.      Hernia: No hernia is present.      Comments: Protuberant    Musculoskeletal: Normal range of motion.      Right lower leg: No edema.      Left lower leg: No edema.   Skin:     General: Skin is warm and dry.      Coloration: Skin is not jaundiced or pale.      Findings: No erythema.   Neurological:      General: No focal deficit present.      Mental Status: He is alert and oriented to person, place, and time.   Psychiatric:         Mood and Affect: Mood normal.         Behavior: Behavior normal.             Results Review:  I have reviewed the labs, radiology results, and diagnostic studies.    Laboratory Data:   Results from last 7 days   Lab Units 01/02/21  0307 01/01/21  1606   WBC 10*3/mm3 12.13* 8.75   HEMOGLOBIN g/dL 15.1 16.1   HEMATOCRIT % 42.3 44.0   PLATELETS 10*3/mm3 243 241        Results from last 7 days   Lab Units 01/02/21  0307 01/01/21  1606   SODIUM mmol/L 136 129*   POTASSIUM mmol/L 4.1 4.4   CHLORIDE mmol/L 99 95*   CO2 mmol/L 26.0 20.0*   BUN mg/dL 19 18   CREATININE mg/dL 0.89 1.13   CALCIUM mg/dL 9.1 9.5   BILIRUBIN mg/dL 0.2 0.4   ALK PHOS U/L 44 56   ALT (SGPT) U/L 13 7   AST (SGOT) U/L 81* 16   GLUCOSE mg/dL 296* 562*       Culture Data:   No results found for: BLOODCX, URINECX, WOUNDCX, MRSACX, RESPCX, STOOLCX    Radiology Data:   Imaging Results (Last 24 Hours)     Procedure Component Value Units Date/Time    XR Chest 1 View [[id number]] Collected: 01/01/21 1621     Updated: 01/01/21 1625    Narrative:      EXAMINATION: XR CHEST 1 VW-. 1/1/2021 4:21 PM CST     CHEST, ONE VIEW:     HISTORY: Acute STEMI     COMPARISON: None     A single frontal chest radiograph was obtained.     FINDINGS:     The level inspiration is relatively shallow and lung volumes diminished.     There is mild  crowding of bronchovascular structures show markings  without definite parenchymal infiltrates.     The heart is within the upper limits of normal in size without evidence  of overt heart failure.     The bony structures are intact.                                     Impression:      1. Shallow inspiration with diminished lung volumes, no definite acute  cardiopulmonary process.     This report was finalized on 01/01/2021 16:22 by Dr. Vasu Ibarra MD.          I have reviewed the patient's current medications.     Assessment/Plan     Active Hospital Problems    Diagnosis   • **ST elevation myocardial infarction involving right coronary artery (CMS/HCC)   • Obesity (BMI 30-39.9)   • Type 2 diabetes mellitus with hyperglycemia, without long-term current use of insulin (CMS/HCC)   • Coronary artery disease involving native coronary artery of native heart   • Hypertriglyceridemia       Plan:  Patient presented on 1/1 to the ER with STEMI.  Patient noted to have ST-elevations on inferior leads upon presentation.  Dr. Fitch took the patient to cath labs and it showed distal right coronary artery and PL branch occlusions in which successful PCI with STEFFI was performed.  PCI with STEFFI to ostial RCA was also successfully performed.  There was noted to have moderate-severe stenosis in the LAD and left circumflex systems as well.  Upon presentation, patient was noted to have a hemoglobin A1c of 14.1.  Hospital medicine was consulted for diabetes management.      1.  Continue DAPT, atorvastatin, metoprolol and lisinopril per cardiology  2.  Echocardiogram pending  3.  Lovenox for VTE PPx  4.  Hemoglobin A1c 14.10; Start levemir 20 units qhs; increase intensity of insulin will give 6 units of insulin x 1 to get blood glucose under better control  5.  Diabetes educator.  Patient will need to be discharge on insulin and will need insulin teaching.  Nutrition consult  6.  Counseled on diet and weight loss   7.  Cardiac rehab per  cardiology   8.  PRN ana, incentive spirometer, flutter   9.  Carbohydrate consistent and cardiac diet                 Discharge Planning: I expect the patient to be discharged per attending physician    Electronically signed by Mauro Rojo MD, 01/02/21, 09:15 CST.

## 2021-01-03 VITALS
RESPIRATION RATE: 16 BRPM | TEMPERATURE: 98.6 F | HEIGHT: 69 IN | DIASTOLIC BLOOD PRESSURE: 69 MMHG | WEIGHT: 198.41 LBS | OXYGEN SATURATION: 98 % | HEART RATE: 59 BPM | BODY MASS INDEX: 29.39 KG/M2 | SYSTOLIC BLOOD PRESSURE: 102 MMHG

## 2021-01-03 PROBLEM — I50.32 CHRONIC DIASTOLIC CHF (CONGESTIVE HEART FAILURE): Status: ACTIVE | Noted: 2021-01-03

## 2021-01-03 LAB
ANION GAP SERPL CALCULATED.3IONS-SCNC: 10 MMOL/L (ref 5–15)
BUN SERPL-MCNC: 22 MG/DL (ref 8–23)
BUN/CREAT SERPL: 23.4 (ref 7–25)
CALCIUM SPEC-SCNC: 9.2 MG/DL (ref 8.6–10.5)
CHLORIDE SERPL-SCNC: 101 MMOL/L (ref 98–107)
CO2 SERPL-SCNC: 26 MMOL/L (ref 22–29)
CREAT SERPL-MCNC: 0.94 MG/DL (ref 0.76–1.27)
DEPRECATED RDW RBC AUTO: 39.4 FL (ref 37–54)
ERYTHROCYTE [DISTWIDTH] IN BLOOD BY AUTOMATED COUNT: 13.2 % (ref 12.3–15.4)
GFR SERPL CREATININE-BSD FRML MDRD: 80 ML/MIN/1.73
GLUCOSE BLDC GLUCOMTR-MCNC: 210 MG/DL (ref 70–130)
GLUCOSE BLDC GLUCOMTR-MCNC: 227 MG/DL (ref 70–130)
GLUCOSE BLDC GLUCOMTR-MCNC: 253 MG/DL (ref 70–130)
GLUCOSE SERPL-MCNC: 262 MG/DL (ref 65–99)
HCT VFR BLD AUTO: 43.1 % (ref 37.5–51)
HGB BLD-MCNC: 14.5 G/DL (ref 13–17.7)
MCH RBC QN AUTO: 27.9 PG (ref 26.6–33)
MCHC RBC AUTO-ENTMCNC: 33.6 G/DL (ref 31.5–35.7)
MCV RBC AUTO: 83 FL (ref 79–97)
PLATELET # BLD AUTO: 224 10*3/MM3 (ref 140–450)
PMV BLD AUTO: 10 FL (ref 6–12)
POTASSIUM SERPL-SCNC: 3.9 MMOL/L (ref 3.5–5.2)
RBC # BLD AUTO: 5.19 10*6/MM3 (ref 4.14–5.8)
SODIUM SERPL-SCNC: 137 MMOL/L (ref 136–145)
WBC # BLD AUTO: 9.26 10*3/MM3 (ref 3.4–10.8)

## 2021-01-03 PROCEDURE — 63710000001 INSULIN LISPRO (HUMAN) PER 5 UNITS: Performed by: INTERNAL MEDICINE

## 2021-01-03 PROCEDURE — 85027 COMPLETE CBC AUTOMATED: CPT | Performed by: INTERNAL MEDICINE

## 2021-01-03 PROCEDURE — 94799 UNLISTED PULMONARY SVC/PX: CPT

## 2021-01-03 PROCEDURE — 63710000001 INSULIN REGULAR HUMAN PER 5 UNITS: Performed by: INTERNAL MEDICINE

## 2021-01-03 PROCEDURE — 82962 GLUCOSE BLOOD TEST: CPT

## 2021-01-03 PROCEDURE — 99238 HOSP IP/OBS DSCHRG MGMT 30/<: CPT | Performed by: INTERNAL MEDICINE

## 2021-01-03 PROCEDURE — 80048 BASIC METABOLIC PNL TOTAL CA: CPT | Performed by: INTERNAL MEDICINE

## 2021-01-03 RX ORDER — NITROGLYCERIN 0.4 MG/1
0.4 TABLET SUBLINGUAL
Qty: 25 TABLET | Refills: 12 | Status: SHIPPED | OUTPATIENT
Start: 2021-01-03 | End: 2022-03-11 | Stop reason: SDUPTHER

## 2021-01-03 RX ORDER — ASPIRIN 81 MG/1
81 TABLET ORAL DAILY
Qty: 30 TABLET | Refills: 11 | Status: SHIPPED | OUTPATIENT
Start: 2021-01-04

## 2021-01-03 RX ORDER — ASPIRIN 81 MG/1
81 TABLET ORAL DAILY
Qty: 30 TABLET | Refills: 11 | Status: CANCELLED | OUTPATIENT
Start: 2021-01-04

## 2021-01-03 RX ORDER — ATORVASTATIN CALCIUM 40 MG/1
40 TABLET, FILM COATED ORAL NIGHTLY
Qty: 30 TABLET | Refills: 3 | Status: SHIPPED | OUTPATIENT
Start: 2021-01-03 | End: 2021-04-12 | Stop reason: SDUPTHER

## 2021-01-03 RX ORDER — LISINOPRIL 5 MG/1
5 TABLET ORAL
Qty: 30 TABLET | Refills: 3 | Status: SHIPPED | OUTPATIENT
Start: 2021-01-04 | End: 2021-04-12 | Stop reason: SDUPTHER

## 2021-01-03 RX ORDER — PERPHENAZINE 16 MG/1
TABLET, FILM COATED ORAL
Qty: 200 EACH | Refills: 0 | Status: SHIPPED | OUTPATIENT
Start: 2021-01-03 | End: 2021-01-28 | Stop reason: SDUPTHER

## 2021-01-03 RX ADMIN — INSULIN LISPRO 2 UNITS: 100 INJECTION, SOLUTION INTRAVENOUS; SUBCUTANEOUS at 11:11

## 2021-01-03 RX ADMIN — LISINOPRIL 5 MG: 5 TABLET ORAL at 08:18

## 2021-01-03 RX ADMIN — INSULIN HUMAN 10 UNITS: 100 INJECTION, SOLUTION PARENTERAL at 08:18

## 2021-01-03 RX ADMIN — TICAGRELOR 90 MG: 90 TABLET ORAL at 08:18

## 2021-01-03 RX ADMIN — METOPROLOL TARTRATE 25 MG: 25 TABLET, FILM COATED ORAL at 08:18

## 2021-01-03 RX ADMIN — INSULIN LISPRO 7 UNITS: 100 INJECTION, SOLUTION INTRAVENOUS; SUBCUTANEOUS at 11:11

## 2021-01-03 RX ADMIN — ASPIRIN 81 MG: 81 TABLET, FILM COATED ORAL at 08:18

## 2021-01-03 NOTE — PROGRESS NOTES
[facility] Medicine Services  INPATIENT PROGRESS NOTE    Patient Name: Aren Beltrán  Date of Admission: 1/1/2021  Today's Date: 01/03/21  Length of Stay: 2  Primary Care Physician: Provider, No Known    Subjective   Chief Complaint: chest pain   HPI     Patient seen and examined at bedside.     Patient being discharged today.  Went over diabetes instructions with patient and he has educational materials provided to him.       Review of Systems   Constitutional: Negative for activity change, chills, diaphoresis and fatigue.   Respiratory: Negative for shortness of breath and wheezing.    Cardiovascular: Negative for chest pain and palpitations.   Gastrointestinal: Negative for abdominal distention, abdominal pain, diarrhea, nausea and vomiting.        All pertinent negatives and positives are as above. All other systems have been reviewed and are negative unless otherwise stated.     Objective    Temp:  [97.9 °F (36.6 °C)-98.6 °F (37 °C)] 98.5 °F (36.9 °C)  Heart Rate:  [63-71] 69  Resp:  [18] 18  BP: (102-126)/(69-96) 118/71  Physical Exam  Vitals signs and nursing note reviewed.   Constitutional:       Appearance: He is obese.   HENT:      Head: Normocephalic and atraumatic.      Mouth/Throat:      Mouth: Mucous membranes are moist.   Eyes:      General: No scleral icterus.     Conjunctiva/sclera: Conjunctivae normal.   Cardiovascular:      Rate and Rhythm: Normal rate and regular rhythm.      Pulses: Normal pulses.   Pulmonary:      Effort: Pulmonary effort is normal. No respiratory distress.      Breath sounds: No stridor. No wheezing, rhonchi or rales.   Abdominal:      General: Abdomen is flat. Bowel sounds are normal. There is no distension.      Palpations: Abdomen is soft. There is no mass.      Tenderness: There is no abdominal tenderness.      Hernia: No hernia is present.      Comments: Protuberant    Musculoskeletal: Normal range of motion.      Right lower leg: No  edema.      Left lower leg: No edema.   Skin:     General: Skin is warm and dry.      Coloration: Skin is not jaundiced or pale.      Findings: No erythema.   Neurological:      General: No focal deficit present.      Mental Status: He is alert and oriented to person, place, and time.   Psychiatric:         Mood and Affect: Mood normal.         Behavior: Behavior normal.             Results Review:  I have reviewed the labs, radiology results, and diagnostic studies.    Laboratory Data:   Results from last 7 days   Lab Units 01/03/21  0424 01/02/21  0307 01/01/21  1606   WBC 10*3/mm3 9.26 12.13* 8.75   HEMOGLOBIN g/dL 14.5 15.1 16.1   HEMATOCRIT % 43.1 42.3 44.0   PLATELETS 10*3/mm3 224 243 241        Results from last 7 days   Lab Units 01/03/21  0424 01/02/21  0307 01/01/21  1606   SODIUM mmol/L 137 136 129*   POTASSIUM mmol/L 3.9 4.1 4.4   CHLORIDE mmol/L 101 99 95*   CO2 mmol/L 26.0 26.0 20.0*   BUN mg/dL 22 19 18   CREATININE mg/dL 0.94 0.89 1.13   CALCIUM mg/dL 9.2 9.1 9.5   BILIRUBIN mg/dL  --  0.2 0.4   ALK PHOS U/L  --  44 56   ALT (SGPT) U/L  --  13 7   AST (SGOT) U/L  --  81* 16   GLUCOSE mg/dL 262* 296* 562*       Culture Data:   No results found for: BLOODCX, URINECX, WOUNDCX, MRSACX, RESPCX, STOOLCX    Radiology Data:   Imaging Results (Last 24 Hours)     ** No results found for the last 24 hours. **          I have reviewed the patient's current medications.     Assessment/Plan     Active Hospital Problems    Diagnosis   • **ST elevation myocardial infarction involving right coronary artery (CMS/[facility])   • Chronic diastolic CHF (congestive heart failure) (CMS/[facility])   • Obesity (BMI 30-39.9)   • Type 2 diabetes mellitus with hyperglycemia, without long-term current use of insulin (CMS/[facility])   • Coronary artery disease involving native coronary artery of native heart   • Hypertriglyceridemia       Plan:  Patient presented on 1/1 to the ER with STEMI.  Patient noted to have ST-elevations on inferior leads  upon presentation.  Dr. Fitch took the patient to cath labs and it showed distal right coronary artery and PL branch occlusions in which successful PCI with STEFFI was performed.  PCI with STEFFI to ostial RCA was also successfully performed.  There was noted to have moderate-severe stenosis in the LAD and left circumflex systems as well.  Upon presentation, patient was noted to have a hemoglobin A1c of 14.1.  Hospital medicine was consulted for diabetes management.      1.  Continue DAPT, atorvastatin, metoprolol and lisinopril per cardiology  2.  Echocardiogram pending  3.  Lovenox for VTE PPx  4.  Hemoglobin A1c 14.10; Levemir 30 units qhs; increase intensity of insulin will give 6 units of insulin x 1 to get blood glucose under better control  5.  Diabetes educator.  Patient will need to be discharge on insulin and will need insulin teaching.  Nutrition consult  6.  Counseled on diet and weight loss   7.  Cardiac rehab per cardiology   8.  PRN duonebs, incentive spirometer, flutter   9.  Carbohydrate consistent and cardiac diet         Final recommendations:  1.  Levemir (or glargine whichever is insurance preference) 30 units at bedtime.    2.  Lispro (or whatever short-acting is insurance preference) 7 units before meals (3 times daily).  For blood sugars greater than 150, for every 50 over 150 add 1 extra unit.  (example, if blood sugar is 277 you will add 3 units to the 7 for a total of 10)  3.  Metformin to start 1/4 500 mg twice a day.  Titrate up to 1000 mg twice daily in 4 weeks.  4.  PCP as soon as possible  5.  Patient will need to measure his blood sugar before every meal and at bedtime.  It is important to eat after you take your insulin.   6.  Patient will need insulin syringes and glucometer with testing supplies.  7.  Provide patient with a blood sugar log      PCP ASAP.    Will sign off.  Orders placed in current medications as to the recommendations.          Discharge Planning: I expect the  patient to be discharged per attending physician    Electronically signed by Mauro Rojo MD, 01/03/21, 10:35 CST.

## 2021-01-03 NOTE — PLAN OF CARE
Goal Outcome Evaluation:  Plan of Care Reviewed With: patient  Progress: no change  Outcome Summary: Pt denied any pain tonight.  Educated on diabetic diet and pt voiced understanding. VSS. Pt on room air and saturating well.  [id number] on telemetry.  Denies any needs at this time. Will continue to monitor.

## 2021-01-04 ENCOUNTER — READMISSION MANAGEMENT (OUTPATIENT)
Dept: CALL CENTER | Facility: HOSPITAL | Age: 71
End: 2021-01-04

## 2021-01-04 NOTE — PAYOR COMM NOTE
"1/4/21 [facility] [phone]  [phone]      PAT[facility].    AUTH [id number]            '''Neftaly Beltrán (70 y.o. Male)     Date of Birth Social Security Number Address Home Phone MRN    1950  [address]  [place] [address] [phone] [id number]    Spiritism Marital Status          Other Unknown       Admission Date Admission Type Admitting Provider Attending Provider Department, Room/Bed    1/1/21 Emergency Marko Fitch MD  [facility] 4B, 431/1    Discharge Date Discharge Disposition Discharge Destination        1/3/2021 Home or Self Care              Attending Provider: (none)   Allergies: No Known Allergies    Isolation: None   Infection: None   Code Status: Prior    Ht: 175.9 cm (69.25\")   Wt: 90 kg (198 lb 6.6 oz)    Admission Cmt: None   Principal Problem: ST elevation myocardial infarction involving right coronary artery (CMS/HCC) [I21.11]                 Active Insurance as of 1/1/2021     Primary Coverage     Payor Plan Insurance Group Employer/Plan Group    ANTHEM MEDICARE REPLACEMENT ANTHEM MEDICARE ADVANTAGE [id number]     Payor Plan Address Payor Plan Phone Number Payor Plan Fax Number Effective Dates    [address] [phone]  1/1/2021 - None Entered    [facility] [id number]       Subscriber Name Subscriber Birth Date Member ID       NEFTALY BELTRÁN 1950 [id number]                 Emergency Contacts      (Rel.) Home Phone Work Phone Mobile Phone    contact, no [phone] -- --         Department Encounter #   1/1/2021  3:59 PM Bh Pad 4b [id number]   Dawood Medley MD   Physician   Emergency Medicine   ED Provider Notes   Signed   Date of Service:  01/01/21 1609   Creation Time:  01/01/21 1609         Procedure Orders   Critical Care [[id number]] ordered by Dawood Medley MD   ECG 12 Lead [[id number]] ordered by Dawood Medley MD   ECG 12 " Lead [[id number]] ordered by Dawood Medley MD   ECG 12 Lead [[id number]] ordered by Dawood Medley MD          Signed        Expand All Collapse All      Show:Clear all  [x]Manual[x]Template[]Copied    Added by:  [x]Dawood Medley MD    []Hover for details     Subjective      70-year-old male presents to the ER with complaint of chest pain, EKG transmitted to the hospital by EMS consistent with inferior wall ST elevation MI, STEMI alert called prior to patient arrival.  Patient reports onset of chest pain that began 2 hours prior to arrival.  Associated with nausea, generalized weakness and shortness of breath.  Pain has been continuous since onset.  He has no previous history of coronary disease but has a family history of coronary disease.  Patient is also former smoker.  He did receive full dose aspirin prior to arrival, also received nitroglycerin and morphine prior to arrival with minimal relief.        History provided by:  Patient                 Course as of Jan 01 1618 Fri Jan 01, 2021   1615 70-year-old male with a strong family history of coronary disease and a previous history of smoking presents to the ER via EMS with an inferior ST elevation MI.  STEMI alert was called prior to the patient arrived to emergency department after we receive the initial EKG transmission.  When patient arrived, he was well-appearing.  Diaphoretic, had ongoing pain.  Repeat EKG confirmed inferior ST elevation MI.  STEMI order set initiated, Covid swab sent and pending.    Heparin was ordered but I believe the patient was carried off to the Cath Lab by the cath team before pharmacy sent the medication down.  Patient had a very brief ER stay.    [AW]   MDM  Number of Diagnoses or Management Options  ST elevation myocardial infarction (STEMI) involving other coronary artery of inferior wall (CMS/HCC): new and requires workup     Amount and/or Complexity of Data Reviewed  Clinical lab tests:  ordered  Discuss the patient with other providers: yes     Risk of Complications, Morbidity, and/or Mortality  Presenting problems: high  Diagnostic procedures: high  Management options: high   Critical Care  Total time providing critical care: < 30 minutes     Patient Progress  Patient progress: stable        Final diagnoses:   ST elevation myocardial infarction (STEMI) involving other coronary artery of inferior wall (CMS/HCC)               Dawood Medley MD  01/01/21 1619  Encounter Information     Department Encounter #   1/1/2021  3:59 PM  Pad 4b [id number]      Marko Fitch MD   Physician   Cardiology   H&P   Signed   Date of Service:  01/01/21 1609   Creation Time:  01/01/21 1609            Signed        Expand All Collapse All      Show:Clear all  [x]Manual[x]Template[]Copied    Added by:  [x]Marko Fitch MD    [][facility] for details     Chief Complaint   Patient presents with   • Chest Pain      HPI: This is a 70-year-old male with no prior cardiac history and no past medical history, presenting with acute onset of chest pain.  Patient says that he was watching TV is abnormally developed acute onset substernal chest pain, nonradiating, associated with diaphoresis, nausea, nonradiating from the chest, severe, constant.  He has never had any heart problems before.  He does not take any medications and is not allergic to any medications.  He is a former smoker with a family history of heart disease.     Chest Pain   This is a new problem. The current episode started today. The onset quality is sudden. The problem occurs constantly. The problem has been unchanged. The pain is present in the substernal region. The pain is severe. The quality of the pain is described as pressure and heavy. The pain does not radiate. Associated symptoms include diaphoresis, malaise/fatigue, nausea and vomiting. Pertinent negatives include no abdominal pain, back pain, claudication, cough, dizziness,  "fever, headaches, hemoptysis, irregular heartbeat, numbness, orthopnea, palpitations, PND, shortness of breath or syncope. The pain is aggravated by nothing. He has tried nothing for the symptoms. Risk factors include male gender.   Pertinent negatives for past medical history include no CAD, no hyperlipidemia and no hypertension.      Medical History   History reviewed. No pertinent past medical history.    Denies any medical issues.                      Review of Systems   Constitution: Positive for diaphoresis and malaise/fatigue. Negative for chills, fever, night sweats and weight loss.   HENT: Negative for congestion and hearing loss.    Eyes: Negative for blurred vision and pain.   Cardiovascular: Positive for chest pain. Negative for claudication, dyspnea on exertion, irregular heartbeat, leg swelling, orthopnea, palpitations, paroxysmal nocturnal dyspnea and syncope.   Respiratory: Negative for cough, hemoptysis, shortness of breath and wheezing.    Endocrine: Negative for cold intolerance and heat intolerance.   Hematologic/Lymphatic: Negative for adenopathy and bleeding problem. Does not bruise/bleed easily.   Skin: Negative for color change, poor wound healing and rash.   Musculoskeletal: Negative for arthritis, back pain, myalgias and neck pain.   Gastrointestinal: Positive for nausea and vomiting. Negative for abdominal pain, change in bowel habit and heartburn.   Genitourinary: Negative for dysuria, frequency, hematuria and nocturia.   Neurological: Negative for dizziness, focal weakness, headaches, light-headedness, loss of balance and numbness.   Psychiatric/Behavioral: Negative for altered mental status, memory loss and substance abuse.   Allergic/Immunologic: Negative for hives and persistent infections.           Physical Exam:     /97   Pulse 84   Resp 22   Ht 175.9 cm (69.25\")   Wt 92.1 kg (203 lb)   SpO2 99%   BMI 29.76 kg/m²   Heart Rate:  [84-88] 84  Resp:  [18-22] 22  BP: " (141-155)/(67-97) 141/97     Vitals signs reviewed.   Constitutional:       General: Awake. In acute distress.      Appearance: Normal appearance. Well-developed. Acutely ill-appearing. Not toxic-appearing or diaphoretic.      Interventions: Nasal cannula in place.   Eyes:      General: Lids are normal.      Extraocular Movements: Extraocular movements intact.      Pupils: Pupils are equal, round, and reactive to light.   HENT:      Head: Normocephalic and atraumatic.      Right Ear: External ear normal.      Left Ear: External ear normal.      Nose: Nose normal.    Mouth/Throat:      Mouth: Mucous membranes are not pale, dry and not cyanotic.      Dentition: Abnormal dentition.      Pharynx: Uvula midline. No oropharyngeal exudate.   Neck:      Musculoskeletal: Normal range of motion and neck supple. No edema.      Thyroid: No thyroid mass or thyromegaly.      Vascular: No carotid bruit, hepatojugular reflux or JVD.      Trachea: No tracheal deviation.      Lymphadenopathy: No cervical adenopathy.   Pulmonary:      Effort:Chest:      Chest wall: Not tender to palpatation.   Cardiovascular:      Normal rate. Regular rhythm.      Murmurs: There is no murmur.      No gallop.   Pulses:     Intact distal pulses.   Edema:     Peripheral edema absent.   Abdominal:      General: Bowel sounds are normal. There is no distension or abdominal bruit.      Palpations: Abdomen is soft. There is no pulsatile midline mass.      Tenderness: There is no abdominal tenderness.   Musculoskeletal: Normal range of motion.         General: No tenderness or deformity.      Extremities: No clubbing present.  Skin:     General: Skin is warm and dry. There is no cyanosis.      Findings: No erythema or rash.   Neurological:      General: No focal deficit present.      Mental Status: Alert, oriented to person, place, and time and oriented to person, place and time.      Cranial Nerves: No cranial nerve deficit.   Psychiatric:         Attention  and Perception: Attention normal.         Mood and Affect: Mood normal.         Speech: Speech normal.         Behavior: Behavior normal.         Thought Content: Thought content normal.      sinus rhythm with inferior ST elevation           Lab Results   Component Value Date     WBC 8.75 01/01/2021     HGB 16.1 01/01/2021     HCT 44.0 01/01/2021     MCV 78.6 (L) 01/01/2021      01/01/2021      No results found for: GLUCOSE, CALCIUM, NA, K, CO2, CL, BUN, CREATININE, EGFRIFAFRI, EGFRIFNONA, BCR, ANIONGAP           Lab Results   Component Value Date     INR 0.90 (L) 01/01/2021     PROTIME 11.7 (L) 01/01/2021      ASSESSMENT/PLAN:     1. Acute inferior STEMI     Given this patient's presentation with inferior ST elevation or ongoing chest pain, we will plan for emergency cardiac catheterization.  I have explained the procedure, risk, benefits and alternatives the patient is agreeable to proceed.  Further plans will be pending the results of the patient's cardiac catheterization.                  Department Encounter #   1/1/2021  3:59 PM [address] [id number]   Romero Tena DO   Physician   Medicine   Consults   Signed   Date of Service:  01/01/21 2059   Creation Time:  01/01/21 2059            Signed        Expand All Collapse All      Show:Clear all  [x]Manual[x]Template[]Copied    Added by:  [x]Romero Tena DO    []Alexander for details       [facility] Medicine Services  HISTORY AND PHYSICAL     Date of Admission: 1/1/2021  Primary Care Physician: Provider, No Known     Subjective      Chief Complaint: The patient was admitted by Dr. Fitch for STEMI, hospitalist has been consulted for glucose control     History of Present Illness  70-year-old male who was admitted earlier in the day with chest pain.  The patient did not have history of cardiac disease.  He tells me that he thought his glucose was controlled, but he thinks that the holidays got away from him.   Patient tells me on exam, that his chest pain is improved.  His O2 is set slightly diminished at 90% on room air.  He denies any bowel or kidney dysfunction, and states that his kidneys have been working quite well since he got to the hospital.  He has no swelling in his lower extremities.           Review of Systems   Chest pain     Otherwi                   Procedure Component Value Units Date/Time     Glucose, Fasting [[id number]]  (Abnormal) Collected: 01/01/21 1928     Specimen: Blood Updated: 01/01/21 2003       Glucose, Fasting 484 mg/dL       POC Glucose Once [[id number]]  (Abnormal) Collected: 01/01/21 1836     Specimen: Blood Updated: 01/01/21 1848       Glucose 450 mg/dL         Comment: : [id number] Ardon RebeccaMeter ID: [id number]        POC Glucose Once [[id number]]  (Abnormal) Collected: 01/01/21 1835     Specimen: Blood Updated: 01/01/21 1848       Glucose 480 mg/dL         Comment: : [id number] Ardon RebeccaMeter ID: [id number]        Magnesium [[id number]]  (Normal) Collected: 01/01/21 1606     Specimen: Blood Updated: 01/01/21 1753       Magnesium 1.9 mg/dL       [place] Draw [[id number]]      Auto resulted.          Comprehensive Metabolic Panel [[id number]]  (Abnormal) Collected: 01/01/21 1606     Specimen: Blood Updated: 01/01/21 1654       Glucose 562 mg/dL         BUN 18 mg/dL         Creatinine 1.13 mg/dL         Sodium 129 mmol/L         Potassium 4.4 mmol/L         Comment: Slight hemolysis detected by analyzer. Results may be affected.          Chloride 95 mmol/L         CO2 20.0 mmol/L         Calcium 9.5 mg/dL         Total Protein 7.4 g/dL         Albumin 4.20 g/dL         ALT (SGPT) 7 U/L         AST (SGOT) 16 U/L         Comment: Slight hemolysis detected by analyzer. Results may be affected.          Alkaline Phosphatase 56 U/L         Total Bilirubin 0.4 mg/dL         eGFR Non African Amer 64 mL/min/1.73         eGFR  African Amer 78 mL/min/1.73         Globulin 3.2 gm/dL          A/G Ratio 1.3 g/dL         BUN/Creatinine Ratio 15.9       Anion Gap 14.0 mmol/L       Narrative:       GFR Normal >60           Protime-INR [[id number]]  (Abnormal) Collected: 01/01/21 1606     Specimen: Blood Updated: 01/01/21 1619       Protime 11.7 Seconds         INR 0.90     CBC & Differential [[id number]]  (Abnormal) Collected: 01/01/21 1606     Specimen: Blood Updated: 01/01/21 1613     Narrative:       The following orders were created for panel order CBC & Differential.  Procedure                               Abnormality         Status                     ---------                               -----------         ------                     CBC Auto Differential[[id number]]        Abnormal            Final result                  Please view results for these tests on the individual orders.     CBC Auto Differential [[id number]]  (Abnormal) Collected: 01/01/21 1606     Specimen: Blood Updated: 01/01/21 1613       WBC 8.75 10*3/mm3         RBC 5.60 10*6/mm3         Hemoglobin 16.1 g/dL         Hematocrit 44.0 %         MCV 78.6 fL         MCH 28.8 pg         MCHC 36.6 g/dL         RDW 12.6 %         RDW-SD 35.4 fl         MPV 9.6 fL         Platelets 241 10*3/mm3         Neutrophil % 69.1 %         Lymphocyte % 19.2 %         Monocyte % 7.7 %         Eosinophil % 1.9 %         Basophil % 1.1 %         Immature Grans % 1.0 %         Neutrophils, Absolute 6.04 10*3/mm3         Lymphocytes, Absolute 1.68 10*3/mm3         Monocytes, Absolute 0.67 10*3/mm3         Eosinophils, Absolute 0.17 10*3/mm3         Basophils, Absolute 0.10 10*3/mm3         Immature Grans, Absolute 0.09 10*3/mm3         nRBC 0.0 /100 WBC             Narrative:        EXAMINATION: XR CHEST 1 VW-. 1/1/2021 4:21 PM CST     CHEST, ONE VIEW:     HISTORY: Acute STEMI     COMPARISON: None     A single frontal chest radiograph was obtained.     FINDINGS:     The level inspiration is relatively shallow and lung volumes diminished.     There is  mild crowding of bronchovascular structures show markings  without definite parenchymal infiltrates.     The heart is within the upper limits of normal in size without evidence  of overt heart failure.     The bony structures are intact.                                      Impression:       1. Shallow inspiration with diminished lung volumes, no definite acute  cardiopulmonary process.     This report was finalized on 2021 16:22 by Dr. Vasu Ibarra MD.          I have personally reviewed and interpreted the radiology studies and ECG obtained at time of admission.   Assessment:       Active Hospital Problems     Diagnosis   • ST elevation myocardial infarction involving right coronary artery (CMS/HCC)   • Heart attack (CMS/HCC)      Impression:  1.  Hyperglycemia   2.  Reactive hyponatremia  3.  STEMI, status post stent placement  4.  Family history of heart disease     Plan:   1.  A1c in the morning  2.  Sliding scale insulin with Accu-Cheks   3.  Labs in the morning   4.  Adjust home medications according to A1c        Code Status: Full     I discussed the patient's findings and my recommendations with the patient     Estimated length of stay 2 to 3 days     Patient seen and examined by me on 2021     Electronically signed by Romero Tena DO, 21, 20:59 CST.     [facility] CATH LAB  [address] [place] [id number] [phone]             Aren Beltrán  Cardiac Catheterization/Vascular Study  Order# [id number]  Reading physician: Marko Fitch MD Ordering physician: Marko Fitch MD Study date: 21    Procedures    Left Heart Cath   Percutaneous Coronary Intervention      Patient Information    Patient Name   Aren Beltrán MRN   [id number] Sex   Male  (Age)   1950 (70 y.o.)   Race Ethnicity Encounter Category   White or  Not  or  Emergency   PACS Images     Show images for Cardiac Catheterization/Vascular Study     Printable Vessel Diagram    Printable Vessel Diagram      Physicians    Panel Physicians Referring Physician Case Authorizing Physician   Marko Fitch MD (Primary)     Pre Procedure Diagnosis    STEMI       Conclusion    Date of Procedure: 01/01/21     Procedures Performed:     1. Selective coronary angiography  2. Acute myocardial infarction percutaneous coronary intervention to the distal and proximal right coronary artery  3. Administration and monitoring of conscious sedation during the procedure     Risk, Benefits, and Alternatives discussed with the patient and/or family.  Plan is for moderate sedation, and the patient agrees to proceed with the procedure.  An immediate assessment was done prior to the administration of moderate sedation.     Indication: Acute inferior STEMI     Access: 5/6 Nicaraguan right radial artery; unable to access both the left and right femoral arterial system due to presumed iliac occlusions and inability to pass the guidewire  Diagnostic Catheters: 6 Nicaraguan JR4, 6 Nicaraguan JL 3.5     Procedural Details: The patient was brought emergently to the cardiac Cath Lab and the cardiac catheterization as well as PCI procedures were explained to the patient, including the perceived risks, benefits and alternatives.  He was agreeable to proceed.  Initially, the skin over the patient's bilateral inguinal area was prepped and draped in the usual sterile fashion.  Lidocaine was administered for anesthesia over the right femoral artery and an 18-gauge Cook needle was used to puncture the right femoral artery, returning pulsatile blood, however there was inability to pass the guidewire past the iliac artery.  This access site was aborted.  Manual pressure was used to achieve hemostasis.  Attention turned to the left femoral area where the same technique was used and the wire once again was unable to pass the iliac artery.  At this point, the skin overlying the patient's right wrist was prepped  and draped in the usual sterile fashion. Timeout had been taken to confirm the correct patient and procedure. IV Versed and fentanyl were used to achieve conscious sedation. Lidocaine was administered for local anesthesia over the right radial artery.  Modified Seldinger technique was then used to place a 5/6 Sammarinese sheath in the right radial artery.  Diagnostic coronary angiography of the right coronary artery was performed with a 6 Sammarinese JR4 guide, confirming an occluded PL branch from the right coronary artery, and the decision was made to proceed with PCI of this vessel. The details of this procedure will be described separately below.  Following the intervention to the right coronary artery, a 6 Sammarinese JL 3.5 catheter was used to perform left coronary angiography.  Following this, there were some ST changes noted on the telemetry monitor, therefore the JR4 guide catheter was once again used to perform a final angiogram of the right coronary artery, revealing the artery to be patent and stents to be widely patent.  Upon completion of all procedures, a TR Band was used to achieve hemostasis. The patient tolerated the procedures well. There were no immediate complications.  During the procedure, I supervised the administration and monitoring of conscious sedation.  This included monitoring of the patient's overall status, hemodynamics and oximetry.  The patient received the first dose of IV sedation at 4:23 PM and I left the room at 5:27 PM, accounting for a total of 64 minutes of face-to-face encounter time with the patient in the cath lab today.     Results:     Selective Coronary Angiography:     Left Main Coronary Artery: The left main coronary artery arises from the left coronary cusp and bifurcates into the LAD and left circumflex arteries.  There is mild disease noted in left main coronary artery.     Left Anterior Descending Artery: The left anterior descending artery arises from the distal left main  coronary artery and supplies a first diagonal branch that appears to have a 60% stenosis in the midportion of this branch.  Following this, there is an additional small caliber diffusely diseased diagonal branch.  Distal to this branch, the LAD is calcified and diffusely diseased with irregularities up to 60 to 70%.  The LAD then terminates by wrapping the apex.      Left Circumflex: The left circumflex artery arises from the distal left main artery and supplies a first obtuse marginal branch which appears to have a hazy calcified 50-60% stenosis in the proximal third of the vessel.  At the takeoff of this branch, there is also at least a 40-50% stenosis.  The circumflex, after the takeoff of the first obtuse marginal branch has an 80% stenosis.  There is then a second obtuse marginal branch which has 2 terminal branches.  These are small caliber branches, however there is a 60-70% stenosis at the bifurcation of the terminal branches.  There is also an AV groove vessel that has mild disease.     Right Coronary Artery: The right coronary artery arises from the right coronary cusp and has a severe stenosis in the proximal third of the vessel, followed by an aneurysmal segment in the midportion the vessel as well as ectasia distally, along with heavy calcification throughout the course the vessel.  The posterior descending artery is free of any significant disease, however there is an acute occlusion of the PL branch system just after the posterior descending artery takeoff.     Percutaneous Coronary Intervention to the right coronary artery:      It should be noted there was difficulty in obtaining access, therefore there was some delay in the CI of the culprit vessel due to difficulty with access.  In any event, a JR4 guide was used for the procedure.  Angiomax bolus and infusion administered for adjunctive anticoagulation during the procedure.  Initially, a prowater wire was attempted to navigate this very tortuous  heavily calcified vessel.  The wire ultimately was passed across the occluded segment and balloon angioplasty was performed with a 2.0 mm balloon.  Following this balloon deployment, upon removal of the balloon, the wire did retract across the lesion and was unable to be advanced further.  Therefore, this was exchanged for a whisper extra-support wire.  Additional balloon inflations were made across the occluded segment.  Also, it was noted there was considerable difficulty even in delivering the 2.0 mm balloon due to a severe stenosis at the ostial portion of the right coronary artery, therefore this same balloon was used to dilate this lesion.  Following this, a guide liner was used for extra-support and a 2.25 x 16 mm Synergy stent was advanced and deployed.  Following this, an NC 2.5 x 12 mm balloon was used to post dilate the stent and was also used to dilate the ostial portion of the right coronary artery.  Following this balloon inflation, the result at the stent site shows minimal residual stenosis, no evidence of dissection, perforation, distal embolization.  The proximal edge of the stent does extend just to the level of the PDA, however the PDA remains widely patent.  Attention then turned to the lesion in the proximal right coronary artery.  A 3.0 x 12 mm Synergy stent was deployed across this lesion and this was postdilated with a 3.5 x 12 mm noncompliant balloon at high atmospheric pressures.  Following the stent deployment, and post dilation, there is minimal residual stenosis at the stent site, however there is noted to be heavy calcification in this area.  There is no evidence of dissection, perforation, distal embolization.     Total contrast: 230 mL     Fluoroscopy time: 17: 55 min     X-ray exposure: 1062 mGy     Estimated Blood Loss: 10 cc     Specimens: None     Complications: None     Impression:  1.  Acute inferior STEMI secondary to distal right coronary artery/PL branch occlusion, now  status post successful PCI to the culprit vessel with synergy drug-eluting stent, also with successful PCI to the ostial right coronary artery as outlined above with synergy drug-eluting stent.  2.  Moderate to severe stenoses in the left anterior descending artery and left circumflex systems as outlined above.     Plan:  1. Routine post procedure orders, including 2 hours bedrest, gentle IV fluids.  2. Aspirin 81 mg daily indefinitely, Brilinta 90 mg p.o. twice daily.  3.  Angiomax for an additional 2 hours then discontinue.  4.  Initiate high intensity statin therapy, beta-blocker therapy.  Check hemoglobin A1c, lipid panel.  5.  Echocardiogram tomorrow to evaluate left ventricular systolic function.  6.  Cardiac rehab referral at discharge.         Time Under Physician Observation    Name Panel   Marko Fitch MD Panel 1   Role: Primary   Time Period: 1/1/2021 1623 - 1/1/2021 1727    Total Sedation Time    Moderate sedation event time was not documented.      Vessel Access    A 6 fr sheath was successfully inserted using micropuncture technique Sheath insertion delayed.    Sheath Disposition    Hemostasis started on the right radial artery. R-Band was used in achieving hemostasis. Radial compression device applied to vessel. Hemostasis achieved successfully.    Stent Inflated     Event Details User   5:02 PM Stent Inflated Stnt Cornry Synergy Bio Mr [id number] - First balloon inflation max pressure = 11 alex. First balloon inflation duration = 20 seconds.  RW   5:10 PM Stent Inflated Stnt Cornry Synergy Bio Mr 3x12mm - First balloon inflation max pressure = 16 alex. First balloon inflation duration = 20 seconds.  RW   Balloon Inflated     Event Details User   4:45 PM Balloon Inflated Baln Ptca Emerge Mr 2x12mm - First balloon inflation max pressure = 6 alex. First balloon inflation duration = 13 seconds.  RW   4:46 PM Balloon Inflated Baln Ptca Emerge Mr 2x12mm - First balloon inflation max pressure = 10  "alex. First balloon inflation duration = 16 seconds.  RW   4:57 PM Balloon Inflated No Supply Selected - First balloon inflation max pressure = 14 alex. First balloon inflation duration = 20 seconds.  RW   4:58 PM Balloon Inflated Baln Ptca Emerge Mr 2x12mm - First balloon inflation max pressure = 14 alex. First balloon inflation duration = 20 seconds.  RW   5:06 PM Balloon Inflated Baln Trek Nc Rx 2.5x12mm - First balloon inflation max pressure = 12 alex. First balloon inflation duration = 25 seconds.  RW   5:14 PM Balloon Inflated Baln Trek Nc Rx 3.5x12mm - First balloon inflation max pressure = 12 alex. First balloon inflation duration = 15 seconds.  RW   5:19 PM Balloon Inflated Baln Trek Nc Rx 3.25x8mm - First balloon inflation max pressure = 18 alex. First balloon inflation duration = 20 seconds.  RW    Radiation     Event   5:31 PM Radiation Tracking   Details: Panel 1: Marko Fitch MD   Cumulative Air Kerma (mGy) = 1062.000; Fluoro Time (min) = 17.9; DAP (mGy-cm2) = 79912.000   User: RW      Total Contrast    Medication Name Total Dose   iopamidol (ISOVUE-370) 76 % injection 230 mL      Patient Hx Of Height, Weight, and Vitals    Height Weight BSA (Calculated - sq m) BMI (kg/m2) Pulse BP   175.9 cm (69.25\") 92.1 kg (203 lb) 2.08 sq meters 29.82 84 141/97   Medications  (Filter: Administrations occurring from 01/01/21 0000 to 01/01/21 1754)  (important)  Continuous medications are totaled by the amount administered until 01/01/21 1754.   Medication Rate/Dose/Volume Action  Date Time    heparin infusion 2 units/mL in 0.9% NaCl (mL) 1,000 mL Given 01/01/21 1618    Total dose as of 01/01/21 1754         1,000 mL         heparin infusion 2 units/mL in 0.9% NaCl (mL) 500 mL Given 01/01/21 1618    Total dose as of 01/01/21 1754         500 mL         lidocaine (cardiac) (XYLOCAINE) injection (mL) 10 mL Given 01/01/21 1624    Total dose as of 01/01/21 1754 10 mL Given 1626    24 mL 4 mL Given 1632    verapamil " (ISOPTIN) 2,500 mcg, nitroglycerin (TRIDIL) 200 mcg, heparin (porcine) 3,000 Units radial artery injection  Given 01/01/21 1635    Total dose as of 01/01/21 1754         Cannot be calculated         bivalirudin (ANGIOMAX) bolus from bag (mL) 13.5 mL Given 01/01/21 1639    Total dose as of 01/01/21 1754         13.5 mL         bivalirudin (ANGIOMAX) 250 mg in 50 mL NS infusion (mg/kg/hr) 1.75 mg/kg/hr - 32.2 mL/hr New Bag 01/01/21 1641    Dosing weight:  92.1 kg 1.75 mg/kg/hr - 32.2 mL/hr New Bag 1726    Total dose as of 01/01/21 1754         Cannot be calculated         diphenhydrAMINE (BENADRYL) injection (mg) 25 mg Given 01/01/21 1621    Total dose as of 01/01/21 1754         25 mg         fentaNYL citrate (PF) (SUBLIMAZE) injection (mcg) 25 mcg Given 01/01/21 1622    Total dose as of 01/01/21 1754 25 mcg Given 1628    125 mcg 25 mcg Given 1633     25 mcg Given 1653     25 mcg Given 1707    Midazolam HCl (PF) (VERSED) injection (mg) 1 mg Given 01/01/21 1622    Total dose as of 01/01/21 1754 1 mg Given 1628    6 mg 1 mg Given 1633     1 mg Given 1653     1 mg Given 1658     1 mg Given 1709    ticagrelor (BRILINTA) tablet (mg) 180 mg Given 01/01/21 1730    Total dose as of 01/01/21 1754         180 mg         iopamidol (ISOVUE-370) 76 % injection (mL) 230 mL Given 01/01/21 1731    Total dose as of 01/01/21 1754         230 mL         Supplies    Name ID Temporary Type Charge Code Description Charge Code Quantity   SOL IRR NACL 0.9PCT BT 1000ML 650 No Supply   1   DEV TORQ GW HOT/PINK 3509 No Supply HC OR SUPPLY SHELL 272/ 823933 1   SOL NACL INJ 0.9PCT 50ML 3528 No Supply   2   SOLIDIFIER LIQUI LOC PLUS 2000CC 4423 No Supply   1   KT VLV HEMO MAP ACC PLS LG/BORE MTL/INTRO 4529 No Supply HC OR SUPPLY SHELL 272/ 988445 1   CATH DIAG INFINITI M/ PK 6FR 4583 No Diagnostic Catheter HC OR SUPPLY SHELL 272/NO CPT 365541 1   XI CO2/O2 NASL A/ 5117 No Supply HC OR SUPPLY SHELL 270/NO CPT 193314 1   KT HEART  MANIFLD LT CUST [facility] 5834 No Supply HC OR SUPPLY SHELL 272/NO CPT 992650 1   TRY SKINPREP SCRB CHG 77026 No Supply   1   GW HITORQUE/WHISPER/ES STR .014 8Q149NY 72764 No Guidewire HC OR SUPPLY SHELL 272/ 934415 1   DEV COMPR RAD TR BND LNG 29CM 81046 No Hemostasis Device HC OR SUPPLY SHELL 272/NO CPT 862900 1   CATH GUIDELINER 6F 135CM 48758 No Diagnostic Catheter HC OR SUPPLY SHELL 278/ 808089 1   BALN TREK NC RX 3.5X12MM 10135 No Balloon HC OR SUPPLY SHELL 278/ 721875 1   BALN TREK NC RX 2.5X12MM 59442 No Balloon HC OR SUPPLY SHELL 278/ 850318 1   BALN TREK NC RX 3.25X8MM 56402 No Balloon HC OR SUPPLY SHELL 278/ 949122 1   BALN PTCA EMERGE MR 2X12MM 98904 No Balloon HC OR SUPPLY SHELL 278/ 032364 1   INTRO GLIDESHEATH SLENDER SS 21G .021 6F 10CM 45CM 68414 No Supply HC OR SUPPLY SHELL 272/ 706752 1   CARTRDG LOGICAL TRANSD PRESS W/2STPCK 41214 No Supply   1   CATH GUIDE VISTABRITE JR4 6F.07IN 89183 No Guide Catheter HC OR SUPPLY SHELL 278/ 075704 1   CATH DIAG INFINITI JL4 6F 100CM 07146 No Diagnostic Catheter HC OR SUPPLY SHELL 272/NO CPT 125026 1   SHEATH INTRO PINN CORNRY .038 6F10CM 91811 No Supply HC OR SUPPLY SHELL 272/ 750556 1   ELECTRD PAD DEFIB A/ 17965 No Supply HC OR SUPPLY SHELL 272/NO CPT 661358 1   ST PRIM PUMP 20DRP 3VLV 127IN 84842 No Supply HC OR SUPPLY SHELL 272/NO CPT 079114 1   DEV INFL ENCORE 44939 No Supply HC OR SUPPLY SHELL 272/NO CPT 738972 1   GW STARTER FXD CORE J .035 4M322DM 3MM 26240 No Guidewire HC OR SUPPLY SHELL 272/ 437175 1   PK CATH CARD 30 CA/4 128945 No Supply   1   GW PTCA PROWATER STR .014IN 180CM 576939 No Guidewire HC OR SUPPLY SHELL 272/ 427259 1   Signed    Electronically signed by Marko Fitch MD on 1/1/21 at 1754 CST       Marko Fitch MD   Physician   Cardiology   Progress Notes   Signed   Date of Service:  01/02/21 0821   Creation Time:  01/02/21 0821            Signed             Show:Clear  "all  [x]Manual[x]Template[]Copied    Added by:  [x]Marko Fitch MD    []Alexander for details     Chief Complaint   Patient presents with   • Chest Pain      S: No acute events overnight.  Chest pain has resolved.  Breathing is stable.  No problems with cardiac catheterization access site.  The patient tells me that he takes no medications at home and is unaware of a diagnosis of diabetes.  He was noted to have significantly elevated blood glucose.  He says that he has never been aware of this before.  He has no complaints today other than being tired.     Medications: Reviewed     Review of Systems: All pertinent negative and positives as noted above.  Otherwise, all systems reviewed and found to be negative.     Telemetry: No significant arrhythmias, sinus rhythm noted     O:  /85 (BP Location: Right arm, Patient Position: Lying)   Pulse 59   Temp 97.9 °F (36.6 °C) (Oral)   Resp 13   Ht 175.9 cm (69.25\")   Wt 99.8 kg (220 lb)   SpO2 94%   BMI 32.25 kg/m²   Temp:  [96 °F (35.6 °C)-97.9 °F (36.6 °C)] 97.9 °F (36.6 °C)  Heart Rate:  [57-96] 59  Resp:  [12-22] 13  BP: (126-167)/() 137/85     Gen: NAD, lying flat in bed  CV: RRR without m/r/g  Pulm: CTAB  GI: s, nt, nd, +bs  Ext: radial cath site ok, femoral access sites ok, no edema                 14     ASSESSMENT/PLAN:     1.  Acute inferior STEMI secondary to distal RCA/PL occlusion, now status post PCI  2.  Coronary artery disease and native coronary artery, residual moderate disease in the left circumflex and left anterior descending arteries  3.  Newly diagnosed diabetes mellitus, uncontrolled with hyperglycemia  4.  Mixed hyperlipidemia     -Transfer to telemetry today.  -Continue dual antiplatelet therapies.  I did discuss the necessity of absolute 100% compliance with dual antiplatelet therapies for a period of at least 12 months.  -Continue beta-blocker therapy, add lisinopril for further blood pressure control, and given this " patient's diagnosis of diabetes.  -Continue high intensity statin therapy.  -Await echocardiogram today to evaluate left ventricular systolic function.  -Cardiac rehab referral at discharge.  -Overnight cardiology coverage did consult the hospitalist for help with hyperglycemia.  Currently, the patient is receiving sliding scale insulin, ordered on admission.  Recommendations were to adjust home medicines according to A1c, however the patient takes no medications.  Appreciate recommendations from the hospitalist service moving forward.  -The patient may be stable for discharge in the next 24 to 48 hours pending his overall status, including blood pressure control, glycemic control.                  Department Encounter #   1/1/2021  3:59 PM [address] [id number]   Mauro Rojo MD   Physician   Medicine   Progress Notes   Signed   Date of Service:  01/02/21 0915   Creation Time:  01/02/21 0915            Signed             Show:Clear all  [x]Manual[x]Template[]Copied    Added by:  [x]Mauro Rojo MD    []Alexander for details       [facility] Medicine Services  INPATIENT PROGRESS NOTE     Patient Name: Aren Beltrán  Date of Admission: 1/1/2021  Today's Date: 01/02/21  Length of Stay: 1  Primary Care Physician: Provider, No Known     Subjective   Chief Complaint: chest pain   HPI      Patient seen and examined at bedside.       Patient new diagnosis of diabetes.  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  Patient will need a primary care physician as well as significant insulin teaching.       Patient had bilateral wheezing on examination, recommended nursing give incentive/flutter and requested a breahting treatment from the respiratory therapist.       Review of Systems   Constitutional: Positive for fatigue. Negative for activity change, chills and fever.   Respiratory: Positive for  wheezing. Negative for cough and shortness of breath.    Cardiovascular: Negative for chest pain and palpitations.   Gastrointestinal: Negative for abdominal distention and abdominal pain.   Neurological: Negative for weakness.         All pertinent negatives and positives are as above. All other systems have been reviewed and are negative unless otherwise stated.                  Objective    Temp:  [96 °F (35.6 °C)-97.9 °F (36.6 °C)] 97.3 °F (36.3 °C)  Heart Rate:  [57-96] 66  Resp:  [12-22] 15  BP: (126-167)/() 134/71  Physical Exam  Vitals signs and nursing note reviewed.   Constitutional:       Appearance: He is obese.   HENT:      Head: Normocephalic and atraumatic.      Mouth/Throat:      Mouth: Mucous membranes are moist.   Eyes:      General: No scleral icterus.     Conjunctiva/sclera: Conjunctivae normal.   Cardiovascular:      Rate and Rhythm: Normal rate and regular rhythm.      Pulses: Normal pulses.   Pulmonary:      Effort: Pulmonary effort is normal. No respiratory distress.      Breath sounds: No stridor. Wheezing present. No rhonchi or rales.   Abdominal:      General: Abdomen is flat. Bowel sounds are normal. There is no distension.      Palpations: Abdomen is soft. There is no mass.      Tenderness: There is no abdominal tenderness.      Hernia: No hernia is present.      Comments: Protuberant    Musculoskeletal: Normal range of motion.      Right lower leg: No edema.      Left lower leg: No edema.   Skin:     General: Skin is warm and dry.      Coloration: Skin is not jaundiced or pale.      Findings: No erythema.   Neurological:      General: No focal deficit present.      Mental Status: He is alert and oriented to person, place, and time.   Psychiatric:         Mood and Affect: Mood normal.         Behavior: Behavior normal.        Active Hospital Problems     Diagnosis   • **ST elevation myocardial infarction involving right coronary artery (CMS/HCC)   • Obesity (BMI 30-39.9)   • Type 2  diabetes mellitus with hyperglycemia, without long-term current use of insulin (CMS/[facility])   • Coronary artery disease involving native coronary artery of native heart   • Hypertriglyceridemia         Plan:  Patient presented on 1/1 to the ER with STEMI.  Patient noted to have ST-elevations on inferior leads upon presentation.  Dr. Fitch took the patient to cath labs and it showed distal right coronary artery and PL branch occlusions in which successful PCI with STEFFI was performed.  PCI with STEFFI to ostial RCA was also successfully performed.  There was noted to have moderate-severe stenosis in the LAD and left circumflex systems as well.  Upon presentation, patient was noted to have a hemoglobin A1c of 14.1.  Hospital medicine was consulted for diabetes management.       1.  Continue DAPT, atorvastatin, metoprolol and lisinopril per cardiology  2.  Echocardiogram pending  3.  Lovenox for VTE PPx  4.  Hemoglobin A1c 14.10; Start levemir 20 units qhs; increase intensity of insulin will give 6 units of insulin x 1 to get blood glucose under better control  5.  Diabetes educator.  Patient will need to be discharge on insulin and will need insulin teaching.  Nutrition consult  6.  Counseled on diet and weight loss   7.  Cardiac rehab per cardiology   8.  PRN duonebs, incentive spirometer, flutter   9.  Carbohydrate consistent and cardiac diet                        Discharge Planning: I expect the patient to be discharged per attending physician     Electronically signed by Mauro Rojo MD, 01/02/21, 09:15 CST.                  Department Encounter #   1/1/2021  3:59 PM [address] [id number]   Mauro Rojo MD   Physician   Medicine   Progress Notes   Signed   Date of Service:  01/03/21 1035   Creation Time:  01/03/21 1035            Signed             Show:Clear all  [x]Manual[x]Template[x]Copied    Added by:  [x]Mauro Rojo MD    [][facility] for details       [facility]  Medicine Services  INPATIENT PROGRESS NOTE     Patient Name: Aren Beltrán  Date of Admission: 1/1/2021  Today's Date: 01/03/21  Length of Stay: 2  Primary Care Physician: Provider, No Known     Subjective   Chief Complaint: chest pain   HPI      Patient seen and examined at bedside.      Patient being discharged today.  Went over diabetes instructions with patient and he has educational materials provided to him.        Review of Systems   Constitutional: Negative for activity change, chills, diaphoresis and fatigue.   Respiratory: Negative for shortness of breath and wheezing.    Cardiovascular: Negative for chest pain and palpitations.   Gastrointestinal: Negative for abdominal distention, abdominal pain, diarrhea, nausea and vomiting.         All pertinent negatives and positives are as above. All other systems have been reviewed and are negative unless otherwise stated.                  Objective    Temp:  [97.9 °F (36.6 °C)-98.6 °F (37 °C)] 98.5 °F (36.9 °C)  Heart Rate:  [63-71] 69  Resp:  [18] 18  BP: (102-126)/(69-96) 118/71  Physical Exam  Vitals signs and nursing note reviewed.   Constitutional:       Appearance: He is obese.   HENT:      Head: Normocephalic and atraumatic.      Mouth/Throat:      Mouth: Mucous membranes are moist.   Eyes:      General: No scleral icterus.     Conjunctiva/sclera: Conjunctivae normal.   Cardiovascular:      Rate and Rhythm: Normal rate and regular rhythm.      Pulses: Normal pulses.   Pulmonary:      Effort: Pulmonary effort is normal. No respiratory distress.      Breath sounds: No stridor. No wheezing, rhonchi or rales.   Abdominal:      General: Abdomen is flat. Bowel sounds are normal. There is no distension.      Palpations: Abdomen is soft. There is no mass.      Tenderness: There is no abdominal tenderness.      Hernia: No hernia is present.      Comments: Protuberant    Musculoskeletal: Normal range of motion.      Right lower leg: No edema.      Left lower leg: No  edema.   Skin:     General: Skin is warm and dry.      Coloration: Skin is not jaundiced or pale.      Findings: No erythema.   Neurological:      General: No focal deficit present.      Mental Status: He is alert and oriented to person, place, and time.   Psychiatric:         Mood and Affect: Mood normal.         Behavior: Behavior normal.                  Results     radiology results, and diagnostic studies.     Laboratory Data:          Results from last 7 days   Lab Units 01/03/21  0424 01/02/21 0307 01/01/21  1606   WBC 10*3/mm3 9.26 12.13* 8.75   HEMOGLOBIN g/dL 14.5 15.1 16.1   HEMATOCRIT % 43.1 42.3 44.0   PLATELETS 10*3/mm3 224 243 241                Results from last 7 days   Lab Units 01/03/21 0424 01/02/21 0307 01/01/21  1606   SODIUM mmol/L 137 136 129*   POTASSIUM mmol/L 3.9 4.1 4.4   CHLORIDE mmol/L 101 99 95*   CO2 mmol/L 26.0 26.0 20.0*   BUN mg/dL 22 19 18   CREATININE mg/dL 0.94 0.89 1.13   CALCIUM mg/dL 9.2 9.1 9.5   BILIRUBIN mg/dL  --  0.2 0.4   ALK PHOS U/L  --  44 56   ALT (SGPT) U/L  --  13 7   AST (SGOT) U/L  --  81* 16   GLUCOSE mg/dL 262* 296* 562*         Culture Data:   No results found for: BLOODCX, URINECX, WOUNDCX, MRSACX, RESPCX, STOOLCX     Radiology Data:       Imaging Results (Last 24 Hours)      ** No results found for the last 24 hours. **         Active Hospital Problems     Diagnosis   • **ST elevation myocardial infarction involving right coronary artery (CMS/[facility])   • Chronic diastolic CHF (congestive heart failure) (CMS/[facility])   • Obesity (BMI 30-39.9)   • Type 2 diabetes mellitus with hyperglycemia, without long-term current use of insulin (CMS/[facility])   • Coronary artery disease involving native coronary artery of native heart   • Hypertriglyceridemia         Plan:  Patient presented on 1/1 to the ER with STEMI.  Patient noted to have ST-elevations on inferior leads upon presentation.  Dr. Fitch took the patient to cath labs and it showed distal right coronary artery  and PL branch occlusions in which successful PCI with SETFFI was performed.  PCI with STEFFI to ostial RCA was also successfully performed.  There was noted to have moderate-severe stenosis in the LAD and left circumflex systems as well.  Upon presentation, patient was noted to have a hemoglobin A1c of 14.1.  Hospital medicine was consulted for diabetes management.       1.  Continue DAPT, atorvastatin, metoprolol and lisinopril per cardiology  2.  Echocardiogram pending  3.  Lovenox for VTE PPx  4.  Hemoglobin A1c 14.10; Levemir 30 units qhs; increase intensity of insulin will give 6 units of insulin x 1 to get blood glucose under better control  5.  Diabetes educator.  Patient will need to be discharge on insulin and will need insulin teaching.  Nutrition consult  6.  Counseled on diet and weight loss   7.  Cardiac rehab per cardiology   8.  PRN duonebs, incentive spirometer, flutter   9.  Carbohydrate consistent and cardiac diet            Final recommendations:  1.  Levemir (or glargine whichever is insurance preference) 30 units at bedtime.    2.  Lispro (or whatever short-acting is insurance preference) 7 units before meals (3 times daily).  For blood sugars greater than 150, for every 50 over 150 add 1 extra unit.  (example, if blood sugar is 277 you will add 3 units to the 7 for a total of 10)  3.  Metformin to start 1/4 500 mg twice a day.  Titrate up to 1000 mg twice daily in 4 weeks.  4.  PCP as soon as possible  5.  Patient will need to measure his blood sugar before every meal and at bedtime.  It is important to eat after you take your insulin.   6.  Patient will need insulin syringes and glucometer with testing supplies.  7.  Provide patient with a blood sugar log     Active Hospital Problems     Diagnosis   • **ST elevation myocardial infarction involving right coronary artery (CMS/[facility])   • Chronic diastolic CHF (congestive heart failure) (CMS/[facility])   • Obesity (BMI 30-39.9)   • Type 2 diabetes mellitus with  hyperglycemia, without long-term current use of insulin (CMS/[facility])   • Coronary artery disease involving native coronary artery of native heart   • Hypertriglyceridemia         Plan:  Patient presented on 1/1 to the ER with STEMI.  Patient noted to have ST-elevations on inferior leads upon presentation.  Dr. Fitch took the patient to cath labs and it showed distal right coronary artery and PL branch occlusions in which successful PCI with STEFFI was performed.  PCI with STEFFI to ostial RCA was also successfully performed.  There was noted to have moderate-severe stenosis in the LAD and left circumflex systems as well.  Upon presentation, patient was noted to have a hemoglobin A1c of 14.1.  Hospital medicine was consulted for diabetes management.       1.  Continue DAPT, atorvastatin, metoprolol and lisinopril per cardiology  2.  Echocardiogram pending  3.  Lovenox for VTE PPx  4.  Hemoglobin A1c 14.10; Levemir 30 units qhs; increase intensity of insulin will give 6 units of insulin x 1 to get blood glucose under better control  5.  Diabetes educator.  Patient will need to be discharge on insulin and will need insulin teaching.  Nutrition consult  6.  Counseled on diet and weight loss   7.  Cardiac rehab per cardiology   8.  PRN duonebs, incentive spirometer, flutter   9.  Carbohydrate consistent and cardiac diet            Final recommendations:  1.  Levemir (or glargine whichever is insurance preference) 30 units at bedtime.    2.  Lispro (or whatever short-acting is insurance preference) 7 units before meals (3 times daily).  For blood sugars greater than 150, for every 50 over 150 add 1 extra unit.  (example, if blood sugar is 277 you will add 3 units to the 7 for a total of 10)  3.  Metformin to start 1/4 500 mg twice a day.  Titrate up to 1000 mg twice daily in 4 weeks.  4.  PCP as soon as possible  5.  Patient will need to measure his blood sugar before every meal and at bedtime.  It is important to eat after you  "take your insulin.   6.  Patient will need insulin syringes and glucometer with testing supplies.  7.  Provide patient with a blood sugar log     SACHIN Esparza   Nurse Practitioner   Cardiology   Discharge Summary       Attested   Date of Service:  01/03/21 1005   Creation Time:  01/03/21 1005            Attested        Attestation signed by Marko Fitch MD at 01/03/21 1239   I have seen and evaluated this patient personally.  I agree with the findings, assessment and plan as outlined by SACHIN Miranda.  In addition, I have the following to add.         Chief Complaint   Patient presents with   • Chest Pain      S: No acute events overnight.  No further chest pain.  Breathing is stable.  No palpitations, lightheadedness, dizziness, syncope.  No problems with cardiac catheterization access site.     Medications: Reviewed     Review of Systems: All pertinent negative and positives as noted above.  Otherwise, all systems reviewed and found to be negative.     Telemetry: No significant arrhythmias     O:  /69 (BP Location: Right arm, Patient Position: Lying)   Pulse 59   Temp 98.6 °F (37 °C) (Oral)   Resp 16   Ht 175.9 cm (69.25\")   Wt 90 kg (198 lb 6.6 oz)   SpO2 98%   BMI 29.09 kg/m²   Temp:  [97.9 °F (36.6 °C)-98.6 °F (37 °C)] 98.6 °F (37 °C)  Heart Rate:  [59-71] 59  Resp:  [16-18] 16  BP: (102-126)/(69-96) 102/69     Gen: NAD, standing up in room  CV: RRR, no mrg  Pulm: CTAB  GI: s, nt, nd, +bs  Ext: no edema                   Echo:  · Left ventricular systolic function is normal. Left ventricular ejection fraction appears to be 56-60%.  · The following left ventricular wall segments are hypokinetic: basal inferolateral and mid inferolateral.  · Left ventricular wall thickness is consistent with mild concentric hypertrophy.  · Left ventricular diastolic dysfunction is noted.  · There is mild mitral valve prolapse of the anterior mitral leaflet.  · No hemodynamically significant " valvular abnormalities identified on this study.     ASSESSMENT/PLAN:     1.  Acute inferior STEMI secondary to distal RCA/PL occlusion, now status post PCI  2.  Coronary artery disease and native coronary artery, residual moderate disease in the left circumflex and left anterior descending arteries  3.  Newly diagnosed diabetes mellitus, uncontrolled with hyperglycemia  4.  Mixed hyperlipidemia     -The patient is stable for discharge home today.  -Continue dual antiplatelet therapies.  I spent a considerable amount of time discussing with the patient the need for 100% compliance with dual antiplatelet therapies, including 81 mg aspirin and Brilinta 90 mg twice daily.  I provided him with at least 20 days of samples of Brilinta today with instructions to check with his pharmacy about the cost and call us immediately if the cost is prohibitive so we can change him to an alternative agent.  -Continue high intensity statin therapy, metoprolol, lisinopril at discharge.  -Appreciate hospitalist assistance with new diagnosis of diabetes and recommendations for diabetic medications.  -Cardiac rehab referral at discharge.  -Echocardiogram with normal systolic function.     Total time spent with patient, including evaluation, preparation of discharge, discussion with patient, 40 minutes.     Presenting Problem/History of Present Illness  ST elevation myocardial infarction (STEMI) involving other coronary artery of inferior wall (CMS/HCC) [I21.19]           Hospital Course  Mr. Beltrán is a 70 y.o. male presented to the ER with acute onset of substernal chest pain with associated diaphoresis and nausea.        Work-up in the ER included EKG that demonstrated inferior ST elevations. He was taken emergently to the cardiac catheterization laboratory where he was found to have occlusion of distal RCA extending to proximal PDA. The vessel was successfully opened with deployment of STEFFI. He tolerated the procedure without  complication and was admitted to the CCU.      The following morning, he was hemodynamically stable and felt stable for transfer to  for ongoing monitoring. On day of discharge patient complained of shortness of breath, felt related to Brilinta. He was ambulated, oxygenating well on RA. He was hemodynamically stable and felt safe for discharge home.       Procedures Performed  1.         On 01/01/2021, Cardiac Catheterization with PCI/STEFFI to RCA by Dr. Marko Fitch.     Consults:   1.         Dr. Romero Valdovinos, Hospitalist     Pertinent Test Results:      2D Echocardiogram:  · Left ventricular systolic function is normal. Left ventricular ejection fraction appears to be 56-60%.  · The following left ventricular wall segments are hypokinetic: basal inferolateral and mid inferolateral.  · Left ventricular wall thickness is consistent with mild concentric hypertrophy.  · Left ventricular diastolic dysfunction is noted.  · There is mild mitral valve prolapse of the anterior mitral leaflet.  · No hemodynamically significant valvular abnormalities identified on this study.   Diabetic medications at discharge per hospitalist recommendations.         Discharge Diet: Cardiac, ADA     Activity at Discharge: No strenuous activity until by cardiology.      Follow-up Appointments  1.         Follow-up with PCP in 3 to 5 days.  2.         Cardiology Follow-up with JET Amaya in 2 weeks.              Additional Instructions for the Follow-ups that You Need to Schedule      Ambulatory Referral to Cardiac Rehab   As directed                Discharge Time/Planning - 25 minutes     SACHIN Cabezas  01/03/21  12:06 CST            No current facility-administered medications for this encounter.      Current Outpatient Medications   Medication Sig Dispense Refill   • aspirin 81 MG EC tablet Take 1 tablet by mouth Daily. 30 tablet 11   • atorvastatin (LIPITOR) 40 MG tablet Take 1 tablet by mouth Every Night. 30 tablet  3   • glucose blood (Contour Next Test) test strip Use as instructed 200 each 0   • insulin detemir (LEVEMIR) 100 UNIT/ML injection Inject 30 Units under the skin into the appropriate area as directed Every Night. 10 mL 0   • insulin lispro (humaLOG, ADMELOG) 100 UNIT/ML injection Inject 1-5 Units under the skin into the appropriate area as directed 3 (Three) Times a Day Before Meals. 10 mL 0   • insulin lispro (humaLOG, ADMELOG) 100 UNIT/ML injection Inject 7 Units under the skin into the appropriate area as directed 3 (Three) Times a Day With Meals. 10 mL 0   • Lancets (accu-chek soft touch) lancets Accu-checks QID, AC and  each 0   • lisinopril (PRINIVIL,ZESTRIL) 5 MG tablet Take 1 tablet by mouth Daily. 30 tablet 3   • metFORMIN (GLUCOPHAGE) 500 MG tablet Take 1 tablet by mouth 2 (Two) Times a Day With Meals. 60 tablet 0   • metoprolol tartrate (LOPRESSOR) 25 MG tablet Take 1 tablet by mouth Every 12 (Twelve) Hours. 60 tablet 3   • nitroglycerin (NITROSTAT) 0.4 MG SL tablet Place 1 tablet under the tongue Every 5 (Five) Minutes As Needed for Chest Pain. Take no more than 3 doses in 15 minutes. 25 tablet 12   • ticagrelor (BRILINTA) 90 MG tablet tablet Take 1 tablet by mouth Every 12 (Twelve) Hours. 60 tablet 11

## 2021-01-04 NOTE — OUTREACH NOTE
Prep Survey      Responses   [facility] facility patient discharged from?  [place]   Is LACE score < 7 ?  No   Emergency Room discharge w/ pulse ox?  No   Eligibility  Readm Mgmt   Discharge diagnosis  STEMI   Does the patient have one of the following disease processes/diagnoses(primary or secondary)?  Acute MI (STEMI,NSTEMI)   Does the patient have Home health ordered?  No   Is there a DME ordered?  No   Comments regarding appointments  office to call   Medication alerts for this patient  see AVS   General alerts for this patient  HX CHF   Prep survey completed?  Yes          Mel Jones RN

## 2021-01-05 ENCOUNTER — READMISSION MANAGEMENT (OUTPATIENT)
Dept: CALL CENTER | Facility: HOSPITAL | Age: 71
End: 2021-01-05

## 2021-01-05 NOTE — OUTREACH NOTE
AMI Week 1 Survey      Responses   [facility] patient discharged from?  [place]   Does the patient have one of the following disease processes/diagnoses(primary or secondary)?  Acute MI (STEMI,NSTEMI)   Week 1 attempt successful?  No   Revoke  Phone number issues          Radha Clemens RN

## 2021-01-11 ENCOUNTER — OFFICE VISIT (OUTPATIENT)
Dept: INTERNAL MEDICINE | Facility: CLINIC | Age: 71
End: 2021-01-11

## 2021-01-11 VITALS
BODY MASS INDEX: 30.42 KG/M2 | TEMPERATURE: 98.7 F | HEART RATE: 54 BPM | HEIGHT: 69 IN | RESPIRATION RATE: 15 BRPM | OXYGEN SATURATION: 100 % | SYSTOLIC BLOOD PRESSURE: 119 MMHG | WEIGHT: 205.4 LBS | DIASTOLIC BLOOD PRESSURE: 70 MMHG

## 2021-01-11 DIAGNOSIS — I25.119 CORONARY ARTERY DISEASE INVOLVING NATIVE CORONARY ARTERY OF NATIVE HEART WITH ANGINA PECTORIS (HCC): ICD-10-CM

## 2021-01-11 DIAGNOSIS — E11.65 TYPE 2 DIABETES MELLITUS WITH HYPERGLYCEMIA, WITHOUT LONG-TERM CURRENT USE OF INSULIN (HCC): Primary | ICD-10-CM

## 2021-01-11 PROCEDURE — 99204 OFFICE O/P NEW MOD 45 MIN: CPT | Performed by: INTERNAL MEDICINE

## 2021-01-11 NOTE — PROGRESS NOTES
Chief Complaint   Patient presents with   • Establish Care   • Dizziness   • Diabetes         History:  Aren Beltrán is a 70 y.o. male who presents today for evaluation of the above problems.      Presents today to establish care.  He was recently managed in the hospital for acute inferior ST elevation MI secondary to distal RCA/PL occlusion status post PCI.  He was also diagnosed with diabetes.  He is currently on aspirin 81 mg daily, Brilinta 90 mg twice a day, high intensity statin, metoprolol and lisinopril.  For his diabetes he is on Levemir 30 units at night, Humalog 7 units 3 times a day before meals, and Metformin 500 mg twice a day.  His A1c January 2, 2021 was 14.1.  His total cholesterol is 151 with LDL 72 and HDL 45 without statin therapy    Has not had any chest pain.  He has been feeling fatigued, tired and has an had some stomach issues.  Prior to his heart attack he was not on any medications and he believes some of the discomfort and symptoms are related to all of the medications he is now on.  Symptoms are improving.    He is taking his blood sugar 3 times a day.  I reviewed his log and mostly has range 1 40-1 60 without any significant lows.  He had 1 low at 89 but no highs    Dizziness is intermittent and improving    HPI    ROS:  Review of Systems   Constitutional: Negative for fatigue.   Respiratory: Negative for shortness of breath.    Cardiovascular: Negative for chest pain.   Gastrointestinal: Negative for diarrhea (Resolved).   Musculoskeletal: Negative.    Neurological: Negative for dizziness and weakness.         Current Outpatient Medications:   •  aspirin 81 MG EC tablet, Take 1 tablet by mouth Daily., Disp: 30 tablet, Rfl: 11  •  atorvastatin (LIPITOR) 40 MG tablet, Take 1 tablet by mouth Every Night., Disp: 30 tablet, Rfl: 3  •  glucose blood (Contour Next Test) test strip, Use as instructed, Disp: 200 each, Rfl: 0  •  insulin detemir (LEVEMIR) 100 UNIT/ML injection, Inject 30 Units  under the skin into the appropriate area as directed Every Night., Disp: 10 mL, Rfl: 0  •  insulin lispro (humaLOG, ADMELOG) 100 UNIT/ML injection, Inject 7 Units under the skin into the appropriate area as directed 3 (Three) Times a Day With Meals., Disp: 10 mL, Rfl: 0  •  Lancets (accu-chek soft touch) lancets, Accu-checks QID, AC and HS, Disp: 120 each, Rfl: 0  •  lisinopril (PRINIVIL,ZESTRIL) 5 MG tablet, Take 1 tablet by mouth Daily., Disp: 30 tablet, Rfl: 3  •  metFORMIN (GLUCOPHAGE) 500 MG tablet, Take 1 tablet by mouth 2 (Two) Times a Day With Meals., Disp: 60 tablet, Rfl: 0  •  metoprolol tartrate (LOPRESSOR) 25 MG tablet, Take 1 tablet by mouth Every 12 (Twelve) Hours., Disp: 60 tablet, Rfl: 3  •  nitroglycerin (NITROSTAT) 0.4 MG SL tablet, Place 1 tablet under the tongue Every 5 (Five) Minutes As Needed for Chest Pain. Take no more than 3 doses in 15 minutes., Disp: 25 tablet, Rfl: 12  •  ticagrelor (BRILINTA) 90 MG tablet tablet, Take 1 tablet by mouth Every 12 (Twelve) Hours., Disp: 60 tablet, Rfl: 11    Lab Results   Component Value Date    GLUCOSE 262 (H) 01/03/2021    BUN 22 01/03/2021    CREATININE 0.94 01/03/2021    EGFRIFNONA 80 01/03/2021    EGFRIFAFRI 78 01/01/2021    BCR 23.4 01/03/2021    K 3.9 01/03/2021    CO2 26.0 01/03/2021    CALCIUM 9.2 01/03/2021    ALBUMIN 3.90 01/02/2021    AST 81 (H) 01/02/2021    ALT 13 01/02/2021       WBC   Date Value Ref Range Status   01/03/2021 9.26 3.40 - 10.80 10*3/mm3 Final     RBC   Date Value Ref Range Status   01/03/2021 5.19 4.14 - 5.80 10*6/mm3 Final     Hemoglobin   Date Value Ref Range Status   01/03/2021 14.5 13.0 - 17.7 g/dL Final     Hematocrit   Date Value Ref Range Status   01/03/2021 43.1 37.5 - 51.0 % Final     MCV   Date Value Ref Range Status   01/03/2021 83.0 79.0 - 97.0 fL Final     MCH   Date Value Ref Range Status   01/03/2021 27.9 26.6 - 33.0 pg Final     MCHC   Date Value Ref Range Status   01/03/2021 33.6 31.5 - 35.7 g/dL Final     RDW  "  Date Value Ref Range Status   01/03/2021 13.2 12.3 - 15.4 % Final     RDW-SD   Date Value Ref Range Status   01/03/2021 39.4 37.0 - 54.0 fl Final     MPV   Date Value Ref Range Status   01/03/2021 10.0 6.0 - 12.0 fL Final     Platelets   Date Value Ref Range Status   01/03/2021 224 140 - 450 10*3/mm3 Final     Neutrophil %   Date Value Ref Range Status   01/01/2021 69.1 42.7 - 76.0 % Final     Lymphocyte %   Date Value Ref Range Status   01/01/2021 19.2 (L) 19.6 - 45.3 % Final     Monocyte %   Date Value Ref Range Status   01/01/2021 7.7 5.0 - 12.0 % Final     Eosinophil %   Date Value Ref Range Status   01/01/2021 1.9 0.3 - 6.2 % Final     Basophil %   Date Value Ref Range Status   01/01/2021 1.1 0.0 - 1.5 % Final     Immature Grans %   Date Value Ref Range Status   01/01/2021 1.0 (H) 0.0 - 0.5 % Final     Neutrophils, Absolute   Date Value Ref Range Status   01/01/2021 6.04 1.70 - 7.00 10*3/mm3 Final     Lymphocytes, Absolute   Date Value Ref Range Status   01/01/2021 1.68 0.70 - 3.10 10*3/mm3 Final     Monocytes, Absolute   Date Value Ref Range Status   01/01/2021 0.67 0.10 - 0.90 10*3/mm3 Final     Eosinophils, Absolute   Date Value Ref Range Status   01/01/2021 0.17 0.00 - 0.40 10*3/mm3 Final     Basophils, Absolute   Date Value Ref Range Status   01/01/2021 0.10 0.00 - 0.20 10*3/mm3 Final     Immature Grans, Absolute   Date Value Ref Range Status   01/01/2021 0.09 (H) 0.00 - 0.05 10*3/mm3 Final     nRBC   Date Value Ref Range Status   01/01/2021 0.0 0.0 - 0.2 /100 WBC Final         OBJECTIVE:  Visit Vitals  /70 (BP Location: Left arm, Patient Position: Sitting, Cuff Size: Adult)   Pulse 54   Temp 98.7 °F (37.1 °C) (Oral)   Resp 15   Ht 175.9 cm (69.25\")   Wt 93.2 kg (205 lb 6.4 oz)   SpO2 100%   BMI 30.11 kg/m²      Physical Exam  Constitutional:       General: He is not in acute distress.     Appearance: He is well-developed. He is obese.      Comments: Very pleasant   HENT:      Head: Normocephalic " and atraumatic.   Eyes:      Pupils: Pupils are equal, round, and reactive to light.   Neck:      Thyroid: No thyromegaly.      Trachea: Phonation normal.   Cardiovascular:      Rate and Rhythm: Normal rate and regular rhythm.      Heart sounds: No murmur.   Pulmonary:      Effort: No respiratory distress.      Breath sounds: No wheezing or rhonchi.   Abdominal:      General: Abdomen is flat.      Palpations: Abdomen is soft.      Tenderness: There is no abdominal tenderness.   Skin:     General: Skin is warm and dry.      Coloration: Skin is not jaundiced or pale.      Findings: No erythema.   Neurological:      Mental Status: He is alert.   Psychiatric:         Behavior: Behavior normal.         Thought Content: Thought content normal.         Judgment: Judgment normal.         Assessment/Plan    Diagnoses and all orders for this visit:    1. Type 2 diabetes mellitus with hyperglycemia, without long-term current use of insulin (CMS/[facility]) (Primary)    2. Coronary artery disease involving native coronary artery of native heart with angina pectoris (CMS/[facility])      Is diabetes is uncontrolled.  His A1c is 14.2.  I did review his blood sugar log and they looked very good.  Glucose ranged from 140-160.  At this time I would not like to make any changes in his insulin regimen.  We will continue with long-acting 30 units at night and bolus 7 units before meals.  Further adjustment based on his glucose level and A1c in 3 months.    He will be seen cardiology in 1 week.  Already has cardiac rehab scheduled.  Continue Lipitor 40 mg daily, lisinopril, metoprolol and dual antiplatelet therapy.    We will recheck a lipid panel and CMP in 3 months as well.    Follow-up in 3 months for his Medicare wellness visit and labs or sooner if clinically indicated    Return in about 3 months (around 4/11/2021) for Medicare Wellness with A1c.    Patient was given instructions and counseling regarding his/her condition or for health  maintenance advice. Please see specific information pulled into the AVS if appropriate.     SUNITA Puentes MD   11:17 CST  1/11/2021

## 2021-01-19 ENCOUNTER — OFFICE VISIT (OUTPATIENT)
Dept: CARDIOLOGY | Facility: CLINIC | Age: 71
End: 2021-01-19

## 2021-01-19 VITALS
HEART RATE: 62 BPM | HEIGHT: 69 IN | WEIGHT: 205 LBS | SYSTOLIC BLOOD PRESSURE: 128 MMHG | DIASTOLIC BLOOD PRESSURE: 72 MMHG | BODY MASS INDEX: 30.36 KG/M2 | OXYGEN SATURATION: 99 %

## 2021-01-19 DIAGNOSIS — R06.02 SHORTNESS OF BREATH: ICD-10-CM

## 2021-01-19 DIAGNOSIS — E11.65 TYPE 2 DIABETES MELLITUS WITH HYPERGLYCEMIA, WITHOUT LONG-TERM CURRENT USE OF INSULIN (HCC): Chronic | ICD-10-CM

## 2021-01-19 DIAGNOSIS — E78.2 MIXED HYPERLIPIDEMIA: ICD-10-CM

## 2021-01-19 DIAGNOSIS — R42 DIZZINESS: ICD-10-CM

## 2021-01-19 DIAGNOSIS — I21.11 ST ELEVATION MYOCARDIAL INFARCTION INVOLVING RIGHT CORONARY ARTERY (HCC): ICD-10-CM

## 2021-01-19 DIAGNOSIS — I25.119 CORONARY ARTERY DISEASE INVOLVING NATIVE CORONARY ARTERY OF NATIVE HEART WITH ANGINA PECTORIS (HCC): Primary | ICD-10-CM

## 2021-01-19 PROBLEM — I25.10 CORONARY ARTERY DISEASE INVOLVING NATIVE CORONARY ARTERY OF NATIVE HEART: Chronic | Status: ACTIVE | Noted: 2021-01-02

## 2021-01-19 PROBLEM — I21.9 HEART ATTACK: Status: RESOLVED | Noted: 2021-01-01 | Resolved: 2021-01-19

## 2021-01-19 PROCEDURE — 93000 ELECTROCARDIOGRAM COMPLETE: CPT | Performed by: NURSE PRACTITIONER

## 2021-01-19 PROCEDURE — 99214 OFFICE O/P EST MOD 30 MIN: CPT | Performed by: NURSE PRACTITIONER

## 2021-01-19 NOTE — PATIENT INSTRUCTIONS
Coronary Artery Disease, Male  Coronary artery disease (CAD) is a condition in which the arteries that lead to the heart (coronary arteries) become narrow or blocked. The narrowing or blockage can lead to decreased blood flow to the heart. Prolonged reduced blood flow can cause a heart attack (myocardial infarction or MI). This condition may also be called coronary heart disease.  Because CAD is the leading cause of death in men, it is important to understand what causes this condition and how it is treated.  What are the causes?  CAD is most often caused by atherosclerosis. This is the buildup of fat and cholesterol (plaque) on the inside of the arteries. Over time, the plaque may narrow or block the artery, reducing blood flow to the heart. Plaque can also become weak and break off within a coronary artery and cause a sudden blockage. Other less common causes of CAD include:  · A blood clot or a piece of a blood clot or other substance that blocks the flow of blood in a coronary artery (embolism).  · A tearing of the artery (spontaneous coronary artery dissection).  · An enlargement of an artery (aneurysm).  · Inflammation (vasculitis) in the artery wall.  What increases the risk?  The following factors may make you more likely to develop this condition:  · Age. Men over age 45 are at a greater risk of CAD.  · Family history of CAD.  · Gender. Men often develop CAD earlier in life than women.  · High blood pressure (hypertension).  · Diabetes.  · High cholesterol levels.  · Tobacco use.  · Excessive alcohol use.  · Lack of exercise.  · A diet high in saturated and trans fats, such as fried food and processed meat.  Other possible risk factors include:  · High stress levels.  · Depression.  · Obesity.  · Sleep apnea.  What are the signs or symptoms?  Many people do not have any symptoms during the early stages of CAD. As the condition progresses, symptoms may include:  · Chest pain (angina). The pain can:  ? Feel  like crushing or squeezing, or like a tightness, pressure, fullness, or heaviness in the chest.  ? Last more than a few minutes or can stop and recur. The pain tends to get worse with exercise or stress and to fade with rest.  · Pain in the arms, neck, jaw, ear, or back.  · Unexplained heartburn or indigestion.  · Shortness of breath.  · Nausea or vomiting.  · Sudden light-headedness.  · Sudden cold sweats.  · Fluttering or fast heartbeat (palpitations).  How is this diagnosed?  This condition is diagnosed based on:  · Your family and medical history.  · A physical exam.  · Tests, including:  ? A test to check the electrical signals in your heart (electrocardiogram).  ? Exercise stress test. This looks for signs of blockage when the heart is stressed with exercise, such as running on a treadmill.  ? Pharmacologic stress test. This test looks for signs of blockage when the heart is being stressed with a medicine.  ? Blood tests.  ? Coronary angiogram. This is a procedure to look at the coronary arteries to see if there is any blockage. During this test, a dye is injected into your arteries so they appear on an X-ray.  ? Coronary artery CT scan. This CT scan helps detect calcium deposits in your coronary arteries. Calcium deposits are an indicator of CAD.  ? A test that uses sound waves to take a picture of your heart (echocardiogram).  ? Chest X-ray.  How is this treated?  This condition may be treated by:  · Healthy lifestyle changes to reduce risk factors.  · Medicines such as:  ? Antiplatelet medicines and blood-thinning medicines, such as aspirin. These help to prevent blood clots.  ? Nitroglycerin.  ? Blood pressure medicines.  ? Cholesterol-lowering medicine.  · Coronary angioplasty and stenting. During this procedure, a thin, flexible tube is inserted through a blood vessel and into a blocked artery. A balloon or similar device on the end of the tube is inflated to open up the artery. In some cases, a small,  mesh tube (stent) is inserted into the artery to keep it open.  · Coronary artery bypass surgery. During this surgery, veins or arteries from other parts of the body are used to create a bypass around the blockage and allow blood to reach your heart.  Follow these instructions at home:  Medicines  · Take over-the-counter and prescription medicines only as told by your health care provider.  · Do not take the following medicines unless your health care provider approves:  ? NSAIDs, such as ibuprofen, naproxen, or celecoxib.  ? Vitamin supplements that contain vitamin A, vitamin E, or both.  Lifestyle  · Follow an exercise program approved by your health care provider. Aim for 150 minutes of moderate exercise or 75 minutes of vigorous exercise each week.  · Maintain a healthy weight or lose weight as approved by your health care provider.  · Learn to manage stress or try to limit your stress. Ask your health care provider for suggestions if you need help.  · Get screened for depression and seek treatment, if needed.  · Do not use any products that contain nicotine or tobacco, such as cigarettes, e-cigarettes, and chewing tobacco. If you need help quitting, ask your health care provider.  · Do not use illegal drugs.  Eating and drinking    · Follow a heart-healthy diet. A dietitian can help educate you about healthy food options and changes. In general, eat plenty of fruits and vegetables, lean meats, and whole grains.  · Avoid foods high in:  ? Sugar.  ? Salt (sodium).  ? Saturated fat, such as processed or fatty meat.  ? Trans fat, such as fried foods.  · Use healthy cooking methods such as roasting, grilling, broiling, baking, poaching, steaming, or stir-frying.  · Do not drink alcohol if your health care provider tells you not to drink.  · If you drink alcohol:  ? Limit how much you have to 0-2 drinks per day.  ? Be aware of how much alcohol is in your drink. In the [place] one drink equals one 12 oz bottle of beer  (355 mL), one 5 oz glass of wine (148 mL), or one 1½ oz glass of hard liquor (44 mL).  General instructions  · Manage any other health conditions, such as hypertension and diabetes. These conditions affect your heart.  · Your health care provider may ask you to monitor your blood pressure. Ideally, your blood pressure should be below 130/80.  · Keep all follow-up visits as told by your health care provider. This is important.  Get help right away if:  · You have pain in your chest, neck, ear, arm, jaw, stomach, or back that:  ? Lasts more than a few minutes.  ? Is recurring.  ? Is not relieved by taking medicine under your tongue (sublingual nitroglycerin).  · You have profuse sweating without cause.  · You have unexplained:  ? Heartburn or indigestion.  ? Shortness of breath or difficulty breathing.  ? Fluttering or fast heartbeat (palpitations).  ? Nausea or vomiting.  ? Fatigue.  ? Feelings of nervousness or anxiety.  ? Weakness.  ? Diarrhea.  · You have sudden light-headedness or dizziness.  · You faint.  · You feel like hurting yourself or think about taking your own life.  These symptoms may represent a serious problem that is an emergency. Do not wait to see if the symptoms will go away. Get medical help right away. Call your local emergency services (911 in the [place] Do not drive yourself to the hospital.  Summary  · Coronary artery disease (CAD) is a condition in which the arteries that lead to the heart (coronary arteries) become narrow or blocked. The narrowing or blockage can lead to a heart attack.  · Many people do not have any symptoms during the early stages of CAD.  · CAD can be treated with lifestyle changes, medicines, surgery, or a combination of these treatments.  This information is not intended to replace advice given to you by your health care provider. Make sure you discuss any questions you have with your health care provider.  Document Revised: 09/06/2019 Document Reviewed:  08/27/2019  Elsevier Patient Education © 2020 [employer]

## 2021-01-19 NOTE — PROGRESS NOTES
Subjective:     Encounter Date:01/19/2021      Patient ID: Aren Beltrán is a 70 y.o. male who was recently admitted to the hospital after ST elevation myocardial infarction presentation with successful percutaneous coronary intervention. The patient presents to the office today for discharge follow-up.    Chief Complaint: Discharge follow up  Coronary Artery Disease  Presents for follow-up visit. Symptoms include dizziness and shortness of breath. Pertinent negatives include no chest pain, chest pressure, chest tightness, leg swelling, palpitations or weight gain. The symptoms have been stable. Compliance with diet is good. Compliance with exercise is good. Compliance with medications is good.        presents today to the clinic for discharge follow-up after recent hospitalization. He presented with inferior ST elevation myocardial infarction and had successful percutaneous coronary mention to the right coronary artery. He previously had reported no prior medical history that was diagnosed with mixed hyperlipidemia as well as diabetes mellitus during his hospitalization. Since his discharge he has established care with a primary care physician, Dr. Puentes, and is on insulin at home. He has been compliant with medications after discharge and is logging his blood sugars and monitoring blood pressures at home. He reports prior to his presentation he had noted a mild chest pressure without other associated symptoms. Since cardiac intervention he has had no chest pressure, chest pain, chest tightness. He does report new shortness of breath described as difficulty breathing while talking. He denies any dyspnea on exertion. He reports 2 episodes of dizziness. He denies any known association with hypotension, hypoglycemia, or bradycardia. He is tolerating dual antiplatelet therapy without any complaints of bleeding. He denies any lower extremity swelling, claudication, near-syncope, syncope, palpitations.  He has just started with cardiac rehab. He has made significant changes to his diet. He is trying to log his meals for better planning for management of his diabetes.    The following portions of the patient's history were reviewed and updated as appropriate: allergies, current medications, past family history, past medical history, past social history and past surgical history.     No Known Allergies     Current outpatient and discharge medications have been reconciled for the patient.  Reviewed by: SACHIN Beckford    Current Outpatient Medications:   •  aspirin 81 MG EC tablet, Take 1 tablet by mouth Daily., Disp: 30 tablet, Rfl: 11  •  atorvastatin (LIPITOR) 40 MG tablet, Take 1 tablet by mouth Every Night., Disp: 30 tablet, Rfl: 3  •  glucose blood (Contour Next Test) test strip, Use as instructed, Disp: 200 each, Rfl: 0  •  insulin detemir (LEVEMIR) 100 UNIT/ML injection, Inject 30 Units under the skin into the appropriate area as directed Every Night., Disp: 10 mL, Rfl: 0  •  insulin lispro (humaLOG, ADMELOG) 100 UNIT/ML injection, Inject 7 Units under the skin into the appropriate area as directed 3 (Three) Times a Day With Meals., Disp: 10 mL, Rfl: 0  •  Lancets (accu-chek soft touch) lancets, Accu-checks QID, AC and HS, Disp: 120 each, Rfl: 0  •  lisinopril (PRINIVIL,ZESTRIL) 5 MG tablet, Take 1 tablet by mouth Daily., Disp: 30 tablet, Rfl: 3  •  metFORMIN (GLUCOPHAGE) 500 MG tablet, Take 1 tablet by mouth 2 (Two) Times a Day With Meals., Disp: 60 tablet, Rfl: 0  •  metoprolol tartrate (LOPRESSOR) 25 MG tablet, Take 1 tablet by mouth Every 12 (Twelve) Hours., Disp: 60 tablet, Rfl: 3  •  nitroglycerin (NITROSTAT) 0.4 MG SL tablet, Place 1 tablet under the tongue Every 5 (Five) Minutes As Needed for Chest Pain. Take no more than 3 doses in 15 minutes., Disp: 25 tablet, Rfl: 12  •  ticagrelor (BRILINTA) 90 MG tablet tablet, Take 1 tablet by mouth Every 12 (Twelve) Hours., Disp: 60 tablet, Rfl: 11  "    Review of Systems   Constitution: Negative for malaise/fatigue, weight gain and weight loss.   Cardiovascular: Negative for chest pain, dyspnea on exertion, leg swelling, near-syncope, orthopnea, palpitations, paroxysmal nocturnal dyspnea and syncope.   Respiratory: Positive for shortness of breath. Negative for chest tightness, cough and wheezing.    Hematologic/Lymphatic: Negative for bleeding problem. Does not bruise/bleed easily.   Gastrointestinal: Positive for diarrhea (improved). Negative for bloating, abdominal pain, nausea and vomiting.   Neurological: Positive for dizziness and paresthesias. Negative for headaches.       ECG 12 Lead    Date/Time: 1/19/2021 3:23 PM  Performed by: Nany uMrray APRN  Authorized by: Nany Murray APRN   Comparison: compared with previous ECG from 1/1/2021  Similar to previous ECG  Rhythm: sinus rhythm  Rate: normal  BPM: 62  Conduction: conduction normal  Q waves: III and aVF    QRS axis: normal  Other findings: non-specific ST-T wave changes    Clinical impression: abnormal EKG            /72   Pulse 62   Ht 175.3 cm (69\")   Wt 93 kg (205 lb)   SpO2 99%   BMI 30.27 kg/m²        Objective:     Vitals signs reviewed.   Constitutional:       General: Not in acute distress.     Appearance: Well-developed. Chronically ill-appearing. Not diaphoretic.   Eyes:      General:         Right eye: No discharge.         Left eye: No discharge.   HENT:      Head: Normocephalic and atraumatic.    Mouth/Throat:      Pharynx: No oropharyngeal exudate.   Neck:      Musculoskeletal: Normal range of motion and neck supple.   Pulmonary:      Effort: Pulmonary effort is normal. No respiratory distress.      Breath sounds: Normal breath sounds. No wheezing. No rhonchi. No rales.   Chest:      Chest wall: Not tender to palpatation.   Cardiovascular:      Normal rate. Regular rhythm.      Murmurs: There is no murmur.      No gallop. No rub.   Edema:     Peripheral edema absent. "   Abdominal:      General: There is no distension.      Palpations: Abdomen is soft.      Tenderness: There is no abdominal tenderness.   Musculoskeletal: Normal range of motion.   Skin:     General: Skin is warm and dry.      Coloration: Skin is pale.      Findings: No erythema or rash.   Neurological:      Mental Status: Alert, oriented to person, place, and time and oriented to person, place and time.   Psychiatric:         Mood and Affect: Mood normal.         Speech: Speech normal.         Behavior: Behavior normal. Behavior is cooperative.         Thought Content: Thought content normal.         Cognition and Memory: Cognition normal.         Judgment: Judgment normal.     Lab Review:     Results for orders placed during the hospital encounter of 01/01/21   Adult Transthoracic Echo Complete W/ Cont if Necessary Per Protocol    Narrative · Left ventricular systolic function is normal. Left ventricular ejection   fraction appears to be 56-60%.  · The following left ventricular wall segments are hypokinetic: basal   inferolateral and mid inferolateral.  · Left ventricular wall thickness is consistent with mild concentric   hypertrophy.  · Left ventricular diastolic dysfunction is noted.  · There is mild mitral valve prolapse of the anterior mitral leaflet.  · No hemodynamically significant valvular abnormalities identified on this   study.        Cardiac catheterization 1/1/2021:  Impression:  1.  Acute inferior STEMI secondary to distal right coronary artery/PL branch occlusion, now status post successful PCI to the culprit vessel with synergy drug-eluting stent, also with successful PCI to the ostial right coronary artery as outlined above with synergy drug-eluting stent.  2.  Moderate to severe stenoses in the left anterior descending artery and left circumflex systems as outlined above.  Results:     Selective Coronary Angiography:     Left Main Coronary Artery: The left main coronary artery arises from the  left coronary cusp and bifurcates into the LAD and left circumflex arteries.  There is mild disease noted in left main coronary artery.     Left Anterior Descending Artery: The left anterior descending artery arises from the distal left main coronary artery and supplies a first diagonal branch that appears to have a 60% stenosis in the midportion of this branch.  Following this, there is an additional small caliber diffusely diseased diagonal branch.  Distal to this branch, the LAD is calcified and diffusely diseased with irregularities up to 60 to 70%.  The LAD then terminates by wrapping the apex.      Left Circumflex: The left circumflex artery arises from the distal left main artery and supplies a first obtuse marginal branch which appears to have a hazy calcified 50-60% stenosis in the proximal third of the vessel.  At the takeoff of this branch, there is also at least a 40-50% stenosis.  The circumflex, after the takeoff of the first obtuse marginal branch has an 80% stenosis.  There is then a second obtuse marginal branch which has 2 terminal branches.  These are small caliber branches, however there is a 60-70% stenosis at the bifurcation of the terminal branches.  There is also an AV groove vessel that has mild disease.     Right Coronary Artery: The right coronary artery arises from the right coronary cusp and has a severe stenosis in the proximal third of the vessel, followed by an aneurysmal segment in the midportion the vessel as well as ectasia distally, along with heavy calcification throughout the course the vessel.  The posterior descending artery is free of any significant disease, however there is an acute occlusion of the PL branch system just after the posterior descending artery takeoff.      Assessment:          Diagnosis Plan   1. Coronary artery disease involving native coronary artery of native heart with angina pectoris (CMS/[facility])     2. Mixed hyperlipidemia     3. ST elevation myocardial  infarction involving right coronary artery (CMS/[facility])     4. Type 2 diabetes mellitus with hyperglycemia, without long-term current use of insulin (CMS/[facility])     5. Dizziness     6. Shortness of breath            Plan:      Coronary artery disease: Patient presented with an acute ST elevation myocardial infarction and underwent cardiac catheterization.  Successful PCI to culprit vessel,  pRCA/PL. coronary angiography revealed a 60% stenosis in the midportion of the first diagonal branch.  There is also noted to be diffuse calcification within the LAD with irregularities up to 60 and 70% distal to the takeoff of the second diagonal branch.  He has disease noted throughout the left circumflex system.  He is on appropriate medical therapy including dual antiplatelet therapy aspirin, Brilinta.  He is on statin therapy as well as beta-blocker therapies.  At present he is having no further chest pressure.  Continue with uninterrupted dual antiplatelet therapy for 12 months.      Mixed hyperlipidemia: Lipid panel from his inpatient stay revealed an LDL of 72.  Goal LDL with newly diagnosed coronary artery disease is less than 70.  The patient was noted to have elevated trigs during admission as well.  He has made dietary changes since his discharge and is compliant with therapy.  He will need a follow-up lipid panel in approximately 3 months.    ST elevation myocardial infarction: 1/1/2021 acute inferior MI.  PCI to RCA with 2 drug-eluting stents.  3.0 x 12 mm Synergy drug-eluting stent was placed proximal RCA.  A 2.25 x 16 mm Synergy drug-eluting stent to an acutely occluded posterior lateral branch.    Diabetes: The patient is establish care with primary care physician and is being managed through their office for his diabetes.  He is on home insulin as well as oral medications.  His hemoglobin A1c during his hospitalization and diagnosis of diabetes was noted to be 14.10%.  He has made dietary changes and is keeping a log  of his blood glucose.    Dizziness: The patient has had 2 episodes of dizziness since his discharge.  He reports that these episodes last less than 1 minute.  He does not correlate any hypotension, bradycardia, or hypoglycemia events to these.  We will continue to monitor for worsening of this complaint and further adjustments that may need to be made to his cardiac medications.    Shortness of breath, he describes of breathlessness.  He describes shortness of breath while talking in conversation.  He denies any dyspnea on exertion, orthopnea, PND.  We will monitor the patient's symptoms that likely could be related to the initiation of Brilinta.  We will continue Brilinta at this time, 90 mg by mouth twice a day.  We will further evaluate the symptoms at his follow-up appointment.    Continue current medications.  The patient will let our office know if he [facility]  We have had a long discussion regarding uninterrupted dual antiplatelet therapy.  He verbalizes understanding.  He will keep the lines of communication open between our office if he is unable to afford his Brilinta or needs [facility].

## 2021-01-28 RX ORDER — PERPHENAZINE 16 MG/1
TABLET, FILM COATED ORAL
Qty: 200 EACH | Refills: 0 | Status: SHIPPED | OUTPATIENT
Start: 2021-01-28

## 2021-01-28 NOTE — TELEPHONE ENCOUNTER
Caller: Aren Beltrán    Relationship: Self    Best call back number: [phone]  Medication needed:    Lancets (accu-chek soft touch) lancets       MICROLETS  glucose blood (Contour Next Test) test strip    insulin detemir (LEVEMIR) 100 UNIT/ML injection  30 Units, Nightly     insulin lispro (humaLOG, ADMELOG) 100 UNIT/ML injection  7 Units, 3 Times Daily With Meals         When do you need the refill by: NEED REFILL BY     What details did the patient provide when requesting the medication: OUT OF SOME OF THE PRESCRIPTIONS     Does the patient have less than a 3 day supply:  [x] Yes  [] No    What is the patient's preferred pharmacy:      [facility]/pharmacy #[id number] - [place], [place] - [address] [phone] [facility] [phone]   [phone]

## 2021-02-01 NOTE — TELEPHONE ENCOUNTER
Caller: SYEDA GUSTAFSON    Relationship:     Best call back number: [phone]    Medication needed:   Requested Prescriptions     Pending Prescriptions Disp Refills   • metFORMIN (GLUCOPHAGE) 500 MG tablet 60 tablet 0     Sig: Take 1 tablet by mouth 2 (Two) Times a Day With Meals.       When do you need the refill by: TODAY      Does the patient have less than a 3 day supply:  [x] Yes  [] No    What is the patient's preferred pharmacy: [facility]/PHARMACY #[id number] - [address] [phone] [facility] [phone]

## 2021-03-08 RX ORDER — LANCETS
EACH MISCELLANEOUS
Qty: 200 EACH | Refills: 11 | Status: SHIPPED | OUTPATIENT
Start: 2021-03-08

## 2021-03-08 NOTE — TELEPHONE ENCOUNTER
Caller: Aren Beltrán    Relationship: Self    Best call back number:[phone]     Medication needed:   Requested Prescriptions     Pending Prescriptions Disp Refills   • Microlet Lancets misc [Pharmacy Med Name: MICROLET LANCETS] 100 each      Sig: USE TO CHECK BLOOD SUGAR 4 TIMES A DAY BEFORE MEALS AND AT BEDTIME *INS ONLY PAY FOR 30 DAY SUPPLY*       When do you need the refill by: 03/08/21    What details did the patient provide when requesting the medication: PT REQUESTING A 3 MONTH SUPPLY IF POSSIBLE    Does the patient have less than a 3 day supply:  [x] Yes  [] No    What is the patient's preferred pharmacy: [facility]/PHARMACY #[id number] - [place], [place] - [address] [phone] [facility] [phone]

## 2021-03-10 NOTE — TELEPHONE ENCOUNTER
Caller: Charmary Aren    Relationship: Self    Best call back number: [phone]    Medication needed:   Requested Prescriptions     Pending Prescriptions Disp Refills   • insulin detemir (LEVEMIR) 100 UNIT/ML injection 10 mL 0     Sig: Inject 30 Units under the skin into the appropriate area as directed Every Night.       When do you need the refill by:   asap    What details did the patient provide when requesting the medication:   n/a    Does the patient have less than a 3 day supply:  [] Yes  [] No    What is the patient's preferred pharmacy: [facility]/PHARMACY #[id number] - [address] [phone] [facility] [phone]          PATIENT WANTED TO KNOW AT WHAT LEVEL SHOULD INSULIN.    Please contact patient and advise

## 2021-03-18 ENCOUNTER — TELEPHONE (OUTPATIENT)
Dept: INTERNAL MEDICINE | Facility: CLINIC | Age: 71
End: 2021-03-18

## 2021-03-18 NOTE — TELEPHONE ENCOUNTER
Caller: PEGGY AND PATIENT     Relationship to patient: Self    Best call back number: [phone]     Patient is needing: PATIENT STATED THAT WHEN HE WENT TO  TEST STRIPS FROM PHARMACY, HE WAS INFORMED THAT HIS INSURANCE WILL NOT COVER THE glucose blood (Contour Next Test) test strip AND WILL ONLY COVER ONE TOUCH OR ACCU-CHEK TEST STRIPS. PATIENT IS ALSO REQUESTING THE TESTING MACHINE BE SENT IN.    If a prescription is needed, what is your preferred pharmacy and phone number: [facility]/PHARMACY #[id number] - [address] [phone] [facility] [phone]

## 2021-03-19 NOTE — TELEPHONE ENCOUNTER
PATIENTS WIFE HAS CALLED, SHE IS REQUESTING STATUS ON PATIENTS MEDICATION.     PLEASE CALL AND ADVISE: [phone]

## 2021-03-19 NOTE — TELEPHONE ENCOUNTER
PATIENT HAS BEEN CALLED, GIVEN MESSAGE THAT [facility] HAS FAXED THE SCRIPT TO [facility].   PATIENT VOICED UNDERSTANDING.

## 2021-03-22 ENCOUNTER — OFFICE VISIT (OUTPATIENT)
Dept: CARDIOLOGY | Facility: CLINIC | Age: 71
End: 2021-03-22

## 2021-03-22 VITALS
HEIGHT: 69 IN | SYSTOLIC BLOOD PRESSURE: 138 MMHG | HEART RATE: 99 BPM | DIASTOLIC BLOOD PRESSURE: 80 MMHG | BODY MASS INDEX: 30.96 KG/M2 | OXYGEN SATURATION: 86 % | WEIGHT: 209 LBS

## 2021-03-22 DIAGNOSIS — E78.2 MIXED HYPERLIPIDEMIA: ICD-10-CM

## 2021-03-22 DIAGNOSIS — E11.65 TYPE 2 DIABETES MELLITUS WITH HYPERGLYCEMIA, WITHOUT LONG-TERM CURRENT USE OF INSULIN (HCC): Chronic | ICD-10-CM

## 2021-03-22 DIAGNOSIS — I25.118 CORONARY ARTERY DISEASE OF NATIVE ARTERY OF NATIVE HEART WITH STABLE ANGINA PECTORIS (HCC): Primary | Chronic | ICD-10-CM

## 2021-03-22 PROBLEM — I21.11 ST ELEVATION MYOCARDIAL INFARCTION INVOLVING RIGHT CORONARY ARTERY: Chronic | Status: ACTIVE | Noted: 2021-01-01

## 2021-03-22 PROBLEM — E78.1 HYPERTRIGLYCERIDEMIA: Chronic | Status: ACTIVE | Noted: 2021-01-02

## 2021-03-22 PROBLEM — I21.9 AMI (ACUTE MYOCARDIAL INFARCTION): Status: RESOLVED | Noted: 2021-01-02 | Resolved: 2021-03-22

## 2021-03-22 PROCEDURE — 99214 OFFICE O/P EST MOD 30 MIN: CPT | Performed by: NURSE PRACTITIONER

## 2021-03-22 RX ORDER — ISOSORBIDE MONONITRATE 30 MG/1
30 TABLET, EXTENDED RELEASE ORAL DAILY
Qty: 90 TABLET | Refills: 3 | Status: SHIPPED | OUTPATIENT
Start: 2021-03-22 | End: 2021-08-27 | Stop reason: ALTCHOICE

## 2021-03-22 NOTE — PATIENT INSTRUCTIONS
Coronary Artery Disease, Male  Coronary artery disease (CAD) is a condition in which the arteries that lead to the heart (coronary arteries) become narrow or blocked. The narrowing or blockage can lead to decreased blood flow to the heart. Prolonged reduced blood flow can cause a heart attack (myocardial infarction or MI). This condition may also be called coronary heart disease.  Because CAD is the leading cause of death in men, it is important to understand what causes this condition and how it is treated.  What are the causes?  CAD is most often caused by atherosclerosis. This is the buildup of fat and cholesterol (plaque) on the inside of the arteries. Over time, the plaque may narrow or block the artery, reducing blood flow to the heart. Plaque can also become weak and break off within a coronary artery and cause a sudden blockage. Other less common causes of CAD include:  · A blood clot or a piece of a blood clot or other substance that blocks the flow of blood in a coronary artery (embolism).  · A tearing of the artery (spontaneous coronary artery dissection).  · An enlargement of an artery (aneurysm).  · Inflammation (vasculitis) in the artery wall.  What increases the risk?  The following factors may make you more likely to develop this condition:  · Age. Men over age 45 are at a greater risk of CAD.  · Family history of CAD.  · Gender. Men often develop CAD earlier in life than women.  · High blood pressure (hypertension).  · Diabetes.  · High cholesterol levels.  · Tobacco use.  · Excessive alcohol use.  · Lack of exercise.  · A diet high in saturated and trans fats, such as fried food and processed meat.  Other possible risk factors include:  · High stress levels.  · Depression.  · Obesity.  · Sleep apnea.  What are the signs or symptoms?  Many people do not have any symptoms during the early stages of CAD. As the condition progresses, symptoms may include:  · Chest pain (angina). The pain can:  ? Feel  like crushing or squeezing, or like a tightness, pressure, fullness, or heaviness in the chest.  ? Last more than a few minutes or can stop and recur. The pain tends to get worse with exercise or stress and to fade with rest.  · Pain in the arms, neck, jaw, ear, or back.  · Unexplained heartburn or indigestion.  · Shortness of breath.  · Nausea or vomiting.  · Sudden light-headedness.  · Sudden cold sweats.  · Fluttering or fast heartbeat (palpitations).  How is this diagnosed?  This condition is diagnosed based on:  · Your family and medical history.  · A physical exam.  · Tests, including:  ? A test to check the electrical signals in your heart (electrocardiogram).  ? Exercise stress test. This looks for signs of blockage when the heart is stressed with exercise, such as running on a treadmill.  ? Pharmacologic stress test. This test looks for signs of blockage when the heart is being stressed with a medicine.  ? Blood tests.  ? Coronary angiogram. This is a procedure to look at the coronary arteries to see if there is any blockage. During this test, a dye is injected into your arteries so they appear on an X-ray.  ? Coronary artery CT scan. This CT scan helps detect calcium deposits in your coronary arteries. Calcium deposits are an indicator of CAD.  ? A test that uses sound waves to take a picture of your heart (echocardiogram).  ? Chest X-ray.  How is this treated?  This condition may be treated by:  · Healthy lifestyle changes to reduce risk factors.  · Medicines such as:  ? Antiplatelet medicines and blood-thinning medicines, such as aspirin. These help to prevent blood clots.  ? Nitroglycerin.  ? Blood pressure medicines.  ? Cholesterol-lowering medicine.  · Coronary angioplasty and stenting. During this procedure, a thin, flexible tube is inserted through a blood vessel and into a blocked artery. A balloon or similar device on the end of the tube is inflated to open up the artery. In some cases, a small,  mesh tube (stent) is inserted into the artery to keep it open.  · Coronary artery bypass surgery. During this surgery, veins or arteries from other parts of the body are used to create a bypass around the blockage and allow blood to reach your heart.  Follow these instructions at home:  Medicines  · Take over-the-counter and prescription medicines only as told by your health care provider.  · Do not take the following medicines unless your health care provider approves:  ? NSAIDs, such as ibuprofen, naproxen, or celecoxib.  ? Vitamin supplements that contain vitamin A, vitamin E, or both.  Lifestyle  · Follow an exercise program approved by your health care provider. Aim for 150 minutes of moderate exercise or 75 minutes of vigorous exercise each week.  · Maintain a healthy weight or lose weight as approved by your health care provider.  · Learn to manage stress or try to limit your stress. Ask your health care provider for suggestions if you need help.  · Get screened for depression and seek treatment, if needed.  · Do not use any products that contain nicotine or tobacco, such as cigarettes, e-cigarettes, and chewing tobacco. If you need help quitting, ask your health care provider.  · Do not use illegal drugs.  Eating and drinking    · Follow a heart-healthy diet. A dietitian can help educate you about healthy food options and changes. In general, eat plenty of fruits and vegetables, lean meats, and whole grains.  · Avoid foods high in:  ? Sugar.  ? Salt (sodium).  ? Saturated fat, such as processed or fatty meat.  ? Trans fat, such as fried foods.  · Use healthy cooking methods such as roasting, grilling, broiling, baking, poaching, steaming, or stir-frying.  · Do not drink alcohol if your health care provider tells you not to drink.  · If you drink alcohol:  ? Limit how much you have to 0-2 drinks per day.  ? Be aware of how much alcohol is in your drink. In the [place] one drink equals one 12 oz bottle of beer  (355 mL), one 5 oz glass of wine (148 mL), or one 1½ oz glass of hard liquor (44 mL).  General instructions  · Manage any other health conditions, such as hypertension and diabetes. These conditions affect your heart.  · Your health care provider may ask you to monitor your blood pressure. Ideally, your blood pressure should be below 130/80.  · Keep all follow-up visits as told by your health care provider. This is important.  Get help right away if:  · You have pain in your chest, neck, ear, arm, jaw, stomach, or back that:  ? Lasts more than a few minutes.  ? Is recurring.  ? Is not relieved by taking medicine under your tongue (sublingual nitroglycerin).  · You have profuse sweating without cause.  · You have unexplained:  ? Heartburn or indigestion.  ? Shortness of breath or difficulty breathing.  ? Fluttering or fast heartbeat (palpitations).  ? Nausea or vomiting.  ? Fatigue.  ? Feelings of nervousness or anxiety.  ? Weakness.  ? Diarrhea.  · You have sudden light-headedness or dizziness.  · You faint.  · You feel like hurting yourself or think about taking your own life.  These symptoms may represent a serious problem that is an emergency. Do not wait to see if the symptoms will go away. Get medical help right away. Call your local emergency services (911 in the [place] Do not drive yourself to the hospital.  Summary  · Coronary artery disease (CAD) is a condition in which the arteries that lead to the heart (coronary arteries) become narrow or blocked. The narrowing or blockage can lead to a heart attack.  · Many people do not have any symptoms during the early stages of CAD.  · CAD can be treated with lifestyle changes, medicines, surgery, or a combination of these treatments.  This information is not intended to replace advice given to you by your health care provider. Make sure you discuss any questions you have with your health care provider.  Document Revised: 09/06/2019 Document Reviewed:  08/27/2019  Elsevier Patient Education © 2021 [employer]

## 2021-03-22 NOTE — PROGRESS NOTES
Subjective:     Encounter Date:03/22/2021      Patient ID: Aren Beltrán is a 70 y.o. male previous medical history of coronary artery disease, diabetes mellitus with insulin use, hyperlipidemia, and obesity presents the office today for follow-up    Chief Complaint: Discharge follow up  Coronary Artery Disease  Presents for follow-up visit. Symptoms include chest pain ( mild episodes, intermittent with exertion ) and shortness of breath. Pertinent negatives include no chest pressure, chest tightness, dizziness, leg swelling, palpitations or weight gain. The symptoms have been stable. Compliance with diet is good. Compliance with exercise is good. Compliance with medications is good.   Shortness of Breath  Associated symptoms include chest pain ( mild episodes, intermittent with exertion ). Pertinent negatives include no abdominal pain, headaches, leg swelling, orthopnea, PND, syncope, vomiting or wheezing. His past medical history is significant for CAD.       Mr. Beltrán presents today for follow up.  He reports that he feels about the same as previous. He has no real complaints, but notes that he still has periods of shortness of breath and now mentions some episodes of chest pain. He is compliant with his medications.  He denies any bleeding or cost issues with his Brilinta. He is compliant with monitoring of his blood glucose levels. He has been logging his blood sugars and checks his BG levels 4x a day. He sees Dr. Puentes in follow up next month.  He reports a few episodes of feeling hypoglycemic with dizziness, I have suggested he speak with Dr. Puentes regarding this. He was asking about changes to his insulin doses.     Previously he had noted some difficulty with feeling the need to take deep breaths after talking. At that time, I wondered if the symptoms that he was describing were related to his Brilinta. No changes were made at that time, follow up today was scheduled for further evaluation of  "his symptoms after staying on the medication a bit longer, as \"brilinta breathing\" can often improve or resolve.     Today he continues to describe periods of \"needing to take a deep breath after walking, or even with talking. He denies exertional shortness of breath with treadmill walking or biking, that he does at [facility] cardiac re[facility].  He denies any isolated episodes of chest pain.  He also is very explicit that his chest pain is very mild and is in no way similar to the intensity he experienced which brought him into the hospital.  He denies chest pain with exertion all the time, but he gives examples when he is carrying the trash to his trash can, or carrying heavy items.      The following portions of the patient's history were reviewed and updated as appropriate: allergies, current medications, past family history, past medical history, past social history and past surgical history.     No Known Allergies       Current Outpatient Medications:   •  aspirin 81 MG EC tablet, Take 1 tablet by mouth Daily., Disp: 30 tablet, Rfl: 11  •  atorvastatin (LIPITOR) 40 MG tablet, Take 1 tablet by mouth Every Night., Disp: 30 tablet, Rfl: 3  •  insulin detemir (LEVEMIR) 100 UNIT/ML injection, Inject 30 Units under the skin into the appropriate area as directed Every Night., Disp: 10 mL, Rfl: 5  •  insulin lispro (humaLOG, ADMELOG) 100 UNIT/ML injection, Inject 7 Units under the skin into the appropriate area as directed 3 (Three) Times a Day With Meals., Disp: 10 mL, Rfl: 0  •  lisinopril (PRINIVIL,ZESTRIL) 5 MG tablet, Take 1 tablet by mouth Daily., Disp: 30 tablet, Rfl: 3  •  metFORMIN (GLUCOPHAGE) 500 MG tablet, Take 1 tablet by mouth 2 (Two) Times a Day With Meals., Disp: 60 tablet, Rfl: 5  •  metoprolol tartrate (LOPRESSOR) 25 MG tablet, Take 1 tablet by mouth Every 12 (Twelve) Hours., Disp: 60 tablet, Rfl: 3  •  nitroglycerin (NITROSTAT) 0.4 MG SL tablet, Place 1 tablet under the tongue Every 5 (Five) Minutes As Needed " "for Chest Pain. Take no more than 3 doses in 15 minutes., Disp: 25 tablet, Rfl: 12  •  ticagrelor (BRILINTA) 90 MG tablet tablet, Take 1 tablet by mouth Every 12 (Twelve) Hours., Disp: 60 tablet, Rfl: 11  •  glucose blood (Contour Next Test) test strip, Use as instructed, Disp: 200 each, Rfl: 0  •  isosorbide mononitrate (IMDUR) 30 MG 24 hr tablet, Take 1 tablet by mouth Daily., Disp: 90 tablet, Rfl: 3  •  Microlet Lancets misc, USE TO CHECK BLOOD SUGAR 4 TIMES A DAY BEFORE MEALS AND AT BEDTIME *INS ONLY PAY FOR 30 DAY SUPPLY*, Disp: 200 each, Rfl: 11     Review of Systems   Constitutional: Negative for malaise/fatigue, weight gain and weight loss.   Cardiovascular: Positive for chest pain ( mild episodes, intermittent with exertion ). Negative for dyspnea on exertion, leg swelling, near-syncope, orthopnea, palpitations, paroxysmal nocturnal dyspnea and syncope.   Respiratory: Positive for shortness of breath. Negative for chest tightness, cough and wheezing.    Hematologic/Lymphatic: Negative for bleeding problem. Does not bruise/bleed easily.   Gastrointestinal: Negative for bloating, abdominal pain, nausea and vomiting.   Neurological: Negative for dizziness, headaches, light-headedness and weakness.   Psychiatric/Behavioral: Negative for altered mental status.     Procedures    /80   Pulse 99   Ht 175.3 cm (69\")   Wt 94.8 kg (209 lb)   SpO2 (!) 86%   BMI 30.86 kg/m²        Objective:     Vitals reviewed.   Constitutional:       General: Not in acute distress.     Appearance: Well-developed and well-groomed. Chronically ill-appearing. Not diaphoretic.   Eyes:      General:         Right eye: No discharge.         Left eye: No discharge.   HENT:      Head: Normocephalic and atraumatic.    Mouth/Throat:      Pharynx: No oropharyngeal exudate.   Pulmonary:      Effort: Pulmonary effort is normal. No respiratory distress.      Breath sounds: Normal breath sounds. No wheezing. No rhonchi. No rales.   Chest: "      Chest wall: Not tender to palpatation.   Cardiovascular:      Normal rate. Regular rhythm.      Murmurs: There is no murmur.      No gallop. No rub.   Edema:     Peripheral edema absent.   Abdominal:      General: There is no distension.      Palpations: Abdomen is soft.      Tenderness: There is no abdominal tenderness.   Musculoskeletal: Normal range of motion.      Cervical back: Normal range of motion and neck supple. Skin:     General: Skin is warm and dry.      Findings: No erythema.   Neurological:      Mental Status: Alert, oriented to person, place, and time and oriented to person, place and time.   Psychiatric:         Mood and Affect: Mood normal.         Speech: Speech normal.         Behavior: Behavior normal. Behavior is cooperative.         Thought Content: Thought content normal.         Cognition and Memory: Cognition normal.         Judgment: Judgment normal.     Lab Review:     Results for orders placed during the hospital encounter of 01/01/21    Adult Transthoracic Echo Complete W/ Cont if Necessary Per Protocol    Interpretation Summary  · Left ventricular systolic function is normal. Left ventricular ejection fraction appears to be 56-60%.  · The following left ventricular wall segments are hypokinetic: basal inferolateral and mid inferolateral.  · Left ventricular wall thickness is consistent with mild concentric hypertrophy.  · Left ventricular diastolic dysfunction is noted.  · There is mild mitral valve prolapse of the anterior mitral leaflet.  · No hemodynamically significant valvular abnormalities identified on this study.    Cardiac catheterization 1/1/2021:  Impression:  1.  Acute inferior STEMI secondary to distal right coronary artery/PL branch occlusion, now status post successful PCI to the culprit vessel with synergy drug-eluting stent, also with successful PCI to the ostial right coronary artery as outlined above with synergy drug-eluting stent.  2.  Moderate to severe  stenoses in the left anterior descending artery and left circumflex systems as outlined above.  Results:     Selective Coronary Angiography:     Left Main Coronary Artery: The left main coronary artery arises from the left coronary cusp and bifurcates into the LAD and left circumflex arteries.  There is mild disease noted in left main coronary artery.     Left Anterior Descending Artery: The left anterior descending artery arises from the distal left main coronary artery and supplies a first diagonal branch that appears to have a 60% stenosis in the midportion of this branch.  Following this, there is an additional small caliber diffusely diseased diagonal branch.  Distal to this branch, the LAD is calcified and diffusely diseased with irregularities up to 60 to 70%.  The LAD then terminates by wrapping the apex.      Left Circumflex: The left circumflex artery arises from the distal left main artery and supplies a first obtuse marginal branch which appears to have a hazy calcified 50-60% stenosis in the proximal third of the vessel.  At the takeoff of this branch, there is also at least a 40-50% stenosis.  The circumflex, after the takeoff of the first obtuse marginal branch has an 80% stenosis.  There is then a second obtuse marginal branch which has 2 terminal branches.  These are small caliber branches, however there is a 60-70% stenosis at the bifurcation of the terminal branches.  There is also an AV groove vessel that has mild disease.     Right Coronary Artery: The right coronary artery arises from the right coronary cusp and has a severe stenosis in the proximal third of the vessel, followed by an aneurysmal segment in the midportion the vessel as well as ectasia distally, along with heavy calcification throughout the course the vessel.  The posterior descending artery is free of any significant disease, however there is an acute occlusion of the PL branch system just after the posterior descending artery  takeoff.      Assessment:          Diagnosis Plan   1. Coronary artery disease of native artery of native heart with stable angina pectoris (CMS/[facility])     2. Mixed hyperlipidemia  Lipid Panel   3. Type 2 diabetes mellitus with hyperglycemia, without long-term current use of insulin (CMS/[facility])            Plan:      -Coronary artery disease: He reports continued episodes of shortness of breath with complaints of taking a deep breath but also with complaints of exertional shortness of breath and associated mild chest pain when carrying objects or walking out to his trash can.  However he also notes tolerating cardiac rehab well where he walks on the treadmill and uses an exercise bike.  Initiate low-dose isosorbide mononitrate 30 mg daily and reassess symptoms in 4 weeks.  I have discussed with him possible side effects of initiating this medication including lower blood pressure and headaches.  It is conceivable that he continues to have some episodes of shortness of breath as described above due to his Brilinta however we will continue on this medication at this time and reassess his symptoms after initiation of nitrate therapy.  He had moderate to severe disease noted in the left anterior descending artery and left circumflex systems which were not addressed at the time of his acute presentation with inferior ischemia.    -Mixed hyperlipidemia: Lipid panel from his inpatient stay revealed an LDL of 72.  Goal LDL with newly diagnosed coronary artery disease is less than 70.  The patient was noted to have elevated trigs during admission as well.  He has made dietary changes since his discharge and is compliant with therapy.  He will need a follow-up lipid panel in near future.  We will place this order today for him to have labs in near future.     Diabetes mellitus: The patient has been recently diagnosed with diabetes during his hospitalization.  He is establish care with a primary care physician and has follow-up  next month with him.  He has been diligent with monitoring blood glucose levels at home and compliant with administration of insulin.    Follow-up in 4 weeks for reevaluation of his complaints of shortness of breath and chest pain after initiation of isosorbide mononitrate 30 mg daily.  Call sooner if needed.

## 2021-04-09 ENCOUNTER — TELEPHONE (OUTPATIENT)
Dept: INTERNAL MEDICINE | Facility: CLINIC | Age: 71
End: 2021-04-09

## 2021-04-09 NOTE — TELEPHONE ENCOUNTER
Patient called and stated that he had been in contact with someone from office earlier and they had requested a name and confirmation # from insurance.     Confirmation #: [id number]    Name: Charity    Date: April 1st    Callback: [phone]

## 2021-04-12 ENCOUNTER — OFFICE VISIT (OUTPATIENT)
Dept: INTERNAL MEDICINE | Facility: CLINIC | Age: 71
End: 2021-04-12

## 2021-04-12 ENCOUNTER — LAB (OUTPATIENT)
Dept: LAB | Facility: HOSPITAL | Age: 71
End: 2021-04-12

## 2021-04-12 VITALS
OXYGEN SATURATION: 97 % | HEART RATE: 58 BPM | RESPIRATION RATE: 15 BRPM | DIASTOLIC BLOOD PRESSURE: 70 MMHG | HEIGHT: 69 IN | TEMPERATURE: 97.2 F | BODY MASS INDEX: 31.21 KG/M2 | WEIGHT: 210.7 LBS | SYSTOLIC BLOOD PRESSURE: 130 MMHG

## 2021-04-12 DIAGNOSIS — I50.32 CHRONIC DIASTOLIC CHF (CONGESTIVE HEART FAILURE) (HCC): ICD-10-CM

## 2021-04-12 DIAGNOSIS — N40.1 BPH WITH OBSTRUCTION/LOWER URINARY TRACT SYMPTOMS: ICD-10-CM

## 2021-04-12 DIAGNOSIS — E78.2 MIXED HYPERLIPIDEMIA: ICD-10-CM

## 2021-04-12 DIAGNOSIS — Z11.59 ENCOUNTER FOR HEPATITIS C SCREENING TEST FOR LOW RISK PATIENT: ICD-10-CM

## 2021-04-12 DIAGNOSIS — N13.8 BPH WITH OBSTRUCTION/LOWER URINARY TRACT SYMPTOMS: ICD-10-CM

## 2021-04-12 DIAGNOSIS — Z00.00 MEDICARE ANNUAL WELLNESS VISIT, SUBSEQUENT: Primary | ICD-10-CM

## 2021-04-12 DIAGNOSIS — I25.118 CORONARY ARTERY DISEASE OF NATIVE ARTERY OF NATIVE HEART WITH STABLE ANGINA PECTORIS (HCC): Chronic | ICD-10-CM

## 2021-04-12 DIAGNOSIS — E11.65 TYPE 2 DIABETES MELLITUS WITH HYPERGLYCEMIA, WITHOUT LONG-TERM CURRENT USE OF INSULIN (HCC): ICD-10-CM

## 2021-04-12 LAB
ALBUMIN UR-MCNC: <1.2 MG/DL
BILIRUB UR QL STRIP: NEGATIVE
CHOLEST SERPL-MCNC: 97 MG/DL (ref 0–200)
CLARITY UR: CLEAR
COLOR UR: YELLOW
CREAT UR-MCNC: 51.8 MG/DL
GLUCOSE UR STRIP-MCNC: NEGATIVE MG/DL
HBA1C MFR BLD: 5.7 %
HCV AB SER DONR QL: NORMAL
HDLC SERPL-MCNC: 59 MG/DL (ref 40–60)
HGB UR QL STRIP.AUTO: NEGATIVE
KETONES UR QL STRIP: NEGATIVE
LDLC SERPL CALC-MCNC: 27 MG/DL (ref 0–100)
LDLC/HDLC SERPL: 0.51 {RATIO}
LEUKOCYTE ESTERASE UR QL STRIP.AUTO: NEGATIVE
MICROALBUMIN/CREAT UR: NORMAL MG/G{CREAT}
NITRITE UR QL STRIP: NEGATIVE
PH UR STRIP.AUTO: 6 [PH] (ref 5–8)
PROT UR QL STRIP: NEGATIVE
PSA SERPL-MCNC: 2.98 NG/ML (ref 0–4)
SP GR UR STRIP: 1.01 (ref 1–1.03)
TRIGL SERPL-MCNC: 41 MG/DL (ref 0–150)
UROBILINOGEN UR QL STRIP: NORMAL
VLDLC SERPL-MCNC: 11 MG/DL (ref 5–40)

## 2021-04-12 PROCEDURE — 82570 ASSAY OF URINE CREATININE: CPT | Performed by: INTERNAL MEDICINE

## 2021-04-12 PROCEDURE — 1159F MED LIST DOCD IN RCRD: CPT | Performed by: INTERNAL MEDICINE

## 2021-04-12 PROCEDURE — 84153 ASSAY OF PSA TOTAL: CPT

## 2021-04-12 PROCEDURE — 80061 LIPID PANEL: CPT

## 2021-04-12 PROCEDURE — 1170F FXNL STATUS ASSESSED: CPT | Performed by: INTERNAL MEDICINE

## 2021-04-12 PROCEDURE — G0439 PPPS, SUBSEQ VISIT: HCPCS | Performed by: INTERNAL MEDICINE

## 2021-04-12 PROCEDURE — 1126F AMNT PAIN NOTED NONE PRSNT: CPT | Performed by: INTERNAL MEDICINE

## 2021-04-12 PROCEDURE — 82043 UR ALBUMIN QUANTITATIVE: CPT | Performed by: INTERNAL MEDICINE

## 2021-04-12 PROCEDURE — 83036 HEMOGLOBIN GLYCOSYLATED A1C: CPT | Performed by: INTERNAL MEDICINE

## 2021-04-12 PROCEDURE — 86803 HEPATITIS C AB TEST: CPT

## 2021-04-12 PROCEDURE — 36415 COLL VENOUS BLD VENIPUNCTURE: CPT

## 2021-04-12 PROCEDURE — 81003 URINALYSIS AUTO W/O SCOPE: CPT | Performed by: INTERNAL MEDICINE

## 2021-04-12 RX ORDER — ATORVASTATIN CALCIUM 40 MG/1
40 TABLET, FILM COATED ORAL NIGHTLY
Qty: 30 TABLET | Refills: 3 | Status: SHIPPED | OUTPATIENT
Start: 2021-04-12 | End: 2021-08-27 | Stop reason: SDUPTHER

## 2021-04-12 RX ORDER — TAMSULOSIN HYDROCHLORIDE 0.4 MG/1
1 CAPSULE ORAL DAILY
Qty: 30 CAPSULE | Refills: 5 | Status: SHIPPED | OUTPATIENT
Start: 2021-04-12 | End: 2021-05-26 | Stop reason: SINTOL

## 2021-04-12 RX ORDER — SEMAGLUTIDE 1.34 MG/ML
0.25 INJECTION, SOLUTION SUBCUTANEOUS WEEKLY
Qty: 1 PEN | Refills: 2 | Status: SHIPPED | OUTPATIENT
Start: 2021-04-12 | End: 2021-05-26 | Stop reason: SINTOL

## 2021-04-12 RX ORDER — LISINOPRIL 5 MG/1
5 TABLET ORAL
Qty: 30 TABLET | Refills: 3 | Status: SHIPPED | OUTPATIENT
Start: 2021-04-12 | End: 2021-08-27 | Stop reason: SDUPTHER

## 2021-04-12 NOTE — PATIENT INSTRUCTIONS
-decrease your levemir insulin to 20 units when you start ozempic  -decrease your mealtime insulin to 5 units  -start ozempic 0.25mg weekly.    Thanks, Aren Beltrán, your appointment is scheduled!  Monday April 19, 2021  Arrive by 2:30 PM  Starts at 2:30 PM     [facility] COVID19 VACCINE CLINIC  [address] [place] [address] N[place]    [facility] [id number] [phone]    The COVID19 vaccine clinic is located in [facility].  Plan to arrive 15 min before your scheduled appointment to allow time for check-in with Outpatient Registration.  Please be sensitive to maintaining a 6 foot distance, and wear something that allows easy access to your upper arm.  You will be expected to wait 15 min following your vaccine to be monitored for potential reactions.  During that wait time your 2nd dose appointment will be scheduled.        Similar to other vaccines, you may experience short-term systemic symptoms for 1-2 days including fever, headache, chills, myalgia or arthralgia post vaccination.  We recommend you take ibuprofen or acetaminophen to mitigate these symptoms.  If symptoms are not improving by day 3 you will need to contact employee health for further evaluation.         Cough, shortness of breath, sore throat, or change in smell or taste are NOT due to receipt of vaccine.  If you experience these symptoms following the vaccine you will need viral testing for SARS-CoV-2 to determine if you are positive for the coronavirus 19.     Please remember to bring your photo ID to verify age and residency before receiving the vaccine.Healthcare workers also need proof of workplace tied to the state providing the vaccination     If you decide you no longer want to be vaccinated, please call scheduling at [phone] to cancel your appointment.    Advance Directive    Advance directives are legal documents that let you make choices ahead of time about your health care and medical treatment in case you become unable to  communicate for yourself. Advance directives are a way for you to make known your wishes to family, friends, and health care providers. This can let others know about your end-of-life care if you become unable to communicate.  Discussing and writing advance directives should happen over time rather than all at once. Advance directives can be changed depending on your situation and what you want, even after you have signed the advance directives.  There are different types of advance directives, such as:  · Medical power of .  · Living will.  · Do not resuscitate (DNR) or do not attempt resuscitation (DNAR) order.  Health care proxy and medical power of   A health care proxy is also called a health care agent. This is a person who is appointed to make medical decisions for you in cases where you are unable to make the decisions yourself. Generally, people choose someone they know well and trust to represent their preferences. Make sure to ask this person for an agreement to act as your proxy. A proxy may have to exercise judgment in the event of a medical decision for which your wishes are not known.  A medical power of  is a legal document that names your health care proxy. Depending on the laws in your state, after the document is written, it may also need to be:  · Signed.  · Notarized.  · Dated.  · Copied.  · Witnessed.  · Incorporated into your medical record.  You may also want to appoint someone to manage your money in a situation in which you are unable to do so. This is called a durable power of  for finances. It is a separate legal document from the durable power of  for health care. You may choose the same person or someone different from your health care proxy to act as your agent in money matters.  If you do not appoint a proxy, or if there is a concern that the proxy is not acting in your best interests, a court may appoint a guardian to act on your  behalf.  Living will  A living will is a set of instructions that state your wishes about medical care when you cannot express them yourself. Health care providers should keep a copy of your living will in your medical record. You may want to give a copy to family members or friends. To alert caregivers in case of an emergency, you can place a card in your wallet to let them know that you have a living will and where they can find it. A living will is used if you become:  · Terminally ill.  · Disabled.  · Unable to communicate or make decisions.  Items to consider in your living will include:  · To use or not to use life-support equipment, such as dialysis machines and breathing machines (ventilators).  · A DNR or DNAR order. This tells health care providers not to use cardiopulmonary resuscitation (CPR) if breathing or heartbeat stops.  · To use or not to use tube feeding.  · To be given or not to be given food and fluids.  · Comfort (palliative) care when the goal becomes comfort rather than a cure.  · Donation of organs and tissues.  A living will does not give instructions for distributing your money and property if you should pass away.  DNR or DNAR  A DNR or DNAR order is a request not to have CPR in the event that your heart stops beating or you stop breathing. If a DNR or DNAR order has not been made and shared, a health care provider will try to help any patient whose heart has stopped or who has stopped breathing. If you plan to have surgery, talk with your health care provider about how your DNR or DNAR order will be followed if problems occur.  What if I do not have an advance directive?  If you do not have an advance directive, some states assign family decision makers to act on your behalf based on how closely you are related to them. Each state has its own laws about advance directives. You may want to check with your health care provider, , or state representative about the laws in your  state.  Summary  · Advance directives are the legal documents that allow you to make choices ahead of time about your health care and medical treatment in case you become unable to tell others about your care.  · The process of discussing and writing advance directives should happen over time. You can change the advance directives, even after you have signed them.  · Advance directives include DNR or DNAR orders, living elise, and designating an agent as your medical power of .  This information is not intended to replace advice given to you by your health care provider. Make sure you discuss any questions you have with your health care provider.  Document Revised: 01/28/2021 Document Reviewed: 07/16/2020  [employer] Patient Education © 2021 [employer]    Advance Care Planning and Advance Directives     You make decisions on a daily basis - decisions about where you want to live, your career, your home, your life. Perhaps one of the most important decisions you face is your choice for future medical care. Take time to talk with your family and your healthcare team and start planning today.  Advance Care Planning is a process that can help you:  · Understand possible future healthcare decisions in light of your own experiences  · Reflect on those decision in light of your goals and values  · Discuss your decisions with those closest to you and the healthcare professionals that care for you  · Make a plan by creating a document that reflects your wishes    Surrogate Decision Maker  In the event of a medical emergency, which has left you unable to communicate or to make your own decisions, you would need someone to make decisions for you.  It is important to discuss your preferences for medical treatment with this person while you are in good health.     Qualities of a surrogate decision maker:  • Willing to take on this role and responsibility  • Knows what you want for future medical care  • Willing to  follow your wishes even if they don't agree with them  • Able to make difficult medical decisions under stressful circumstances    Advance Directives  These are legal documents you can create that will guide your healthcare team and decision maker(s) when needed. These documents can be stored in the electronic medical record.    · Living Will - a legal document to guide your care if you have a terminal condition or a serious illness and are unable to communicate. States vary by statute in document names/types, but most forms may include one or more of the following:        -  Directions regarding life-prolonging treatments        -  Directions regarding artificially provided nutrition/hydration        -  Choosing a healthcare decision maker        -  Direction regarding organ/tissue donation    · Durable Power of  for Healthcare - this document names an -in-fact to make medical decisions for you, but it may also allow this person to make personal and financial decisions for you. Please seek the advice of an  if you need this type of document.    **Advance Directives are not required and no one may discriminate against you if you do not sign one.    Medical Orders  Many states allow specific forms/orders signed by your physician to record your wishes for medical treatment in your current state of health. This form, signed in personal communication with your physician, addresses resuscitation and other medical interventions that you may or may not want.      For more information or to schedule a time with a [facility] Advance Care Planning Facilitator contact: [email or web address] or call [phone] and someone will contact you directly.

## 2021-04-12 NOTE — PROGRESS NOTES
The ABCs of the Annual Wellness Visit  Subsequent Medicare Wellness Visit    Chief Complaint   Patient presents with   • Medicare Wellness-subsequent       Subjective   History of Present Illness:  Aren Beltrán is a 70 y.o. male who presents for a Subsequent Medicare Wellness Visit.    Overall, he is doing better.  He does urinate very frequently.  Sometimes it is difficult for him to urinate and other times he will have decreased stream and then go again.    His blood sugar log was reviewed which is very good and there were no lows.  His A1c today is 5.7 from 14.1 on January 2, 2021.  He is watching his carbohydrates.  His weight has remained stable.      HEALTH RISK ASSESSMENT    Recent Hospitalizations:  Recently treated at the following:  [facility]    Current Medical Providers:  Patient Care Team:  CASE Puentes MD as PCP - General (Internal Medicine)    Smoking Status:  Social History     Tobacco Use   Smoking Status Former Smoker   Smokeless Tobacco Never Used       Alcohol Consumption:  Social History     Substance and Sexual Activity   Alcohol Use Not Currently       Depression Screen:   PHQ-2/PHQ-9 Depression Screening 4/12/2021   Feeling down, depressed, or hopeless 0   Total Score 0       Fall Risk Screen:  STEADI Fall Risk Assessment was completed, and patient is at MODERATE risk for falls. Assessment completed on:4/12/2021    Health Habits and Functional and Cognitive Screening:  Functional & Cognitive Status 4/12/2021   Do you have difficulty preparing food and eating? No   Do you have difficulty bathing yourself, getting dressed or grooming yourself? No   Do you have difficulty using the toilet? No   Do you have difficulty moving around from place to place? No   Do you have trouble with steps or getting out of a bed or a chair? No   Current Diet Well Balanced Diet   Dental Exam Not up to date   Eye Exam Up to date   Exercise (times per week) 3 times per week   Current Exercises  Include Cardiovascular Workout   Do you need help using the phone?  No   Are you deaf or do you have serious difficulty hearing?  No   Do you need help with transportation? No   Do you need help shopping? No   Do you need help preparing meals?  No   Do you need help with housework?  No   Do you need help with laundry? No   Do you need help taking your medications? No   Do you need help managing money? No   Do you ever drive or ride in a car without wearing a seat belt? No   Have you felt unusual stress, anger or loneliness in the last month? No   Who do you live with? Spouse   If you need help, do you have trouble finding someone available to you? No   Have you been bothered in the last four weeks by sexual problems? No   Do you have difficulty concentrating, remembering or making decisions? No         Does the patient have evidence of cognitive impairment? No    Asprin use counseling:Taking ASA appropriately as indicated    Age-appropriate Screening Schedule:  Refer to the list below for future screening recommendations based on patient's age, sex and/or medical conditions. Orders for these recommended tests are listed in the plan section. The patient has been provided with a written plan.    Health Maintenance   Topic Date Due   • URINE MICROALBUMIN  Never done   • COLONOSCOPY  Never done   • TDAP/TD VACCINES (1 - Tdap) Never done   • ZOSTER VACCINE (1 of 2) Never done   • DIABETIC FOOT EXAM  Never done   • DIABETIC EYE EXAM  Never done   • INFLUENZA VACCINE  08/01/2021   • HEMOGLOBIN A1C  10/12/2021   • LIPID PANEL  01/02/2022          The following portions of the patient's history were reviewed and updated as appropriate: allergies, current medications, past family history, past medical history, past social history, past surgical history and problem list.    Outpatient Medications Prior to Visit   Medication Sig Dispense Refill   • aspirin 81 MG EC tablet Take 1 tablet by mouth Daily. 30 tablet 11   • glucose  blood (Contour Next Test) test strip Use as instructed 200 each 0   • isosorbide mononitrate (IMDUR) 30 MG 24 hr tablet Take 1 tablet by mouth Daily. 90 tablet 3   • metFORMIN (GLUCOPHAGE) 500 MG tablet Take 1 tablet by mouth 2 (Two) Times a Day With Meals. 60 tablet 5   • Microlet Lancets misc USE TO CHECK BLOOD SUGAR 4 TIMES A DAY BEFORE MEALS AND AT BEDTIME *INS ONLY PAY FOR 30 DAY SUPPLY* 200 each 11   • nitroglycerin (NITROSTAT) 0.4 MG SL tablet Place 1 tablet under the tongue Every 5 (Five) Minutes As Needed for Chest Pain. Take no more than 3 doses in 15 minutes. 25 tablet 12   • ticagrelor (BRILINTA) 90 MG tablet tablet Take 1 tablet by mouth Every 12 (Twelve) Hours. 60 tablet 11   • atorvastatin (LIPITOR) 40 MG tablet Take 1 tablet by mouth Every Night. 30 tablet 3   • insulin detemir (LEVEMIR) 100 UNIT/ML injection Inject 30 Units under the skin into the appropriate area as directed Every Night. 10 mL 5   • insulin lispro (humaLOG, ADMELOG) 100 UNIT/ML injection Inject 7 Units under the skin into the appropriate area as directed 3 (Three) Times a Day With Meals. 10 mL 0   • lisinopril (PRINIVIL,ZESTRIL) 5 MG tablet Take 1 tablet by mouth Daily. 30 tablet 3   • metoprolol tartrate (LOPRESSOR) 25 MG tablet Take 1 tablet by mouth Every 12 (Twelve) Hours. 60 tablet 3     No facility-administered medications prior to visit.       Patient Active Problem List   Diagnosis   • ST elevation myocardial infarction involving right coronary artery (CMS/HCC)   • Obesity (BMI 30-39.9)   • Type 2 diabetes mellitus with hyperglycemia, without long-term current use of insulin (CMS/HCC)   • Coronary artery disease involving native coronary artery of native heart   • Hypertriglyceridemia   • Chronic diastolic CHF (congestive heart failure) (CMS/HCC)   • Mixed hyperlipidemia   • Dizziness   • Shortness of breath       Advanced Care Planning:  ACP discussion was held with the patient during this visit. Patient does not have an  "advance directive, information provided.    Review of Systems   Constitutional: Negative for fatigue.   Respiratory: Negative for shortness of breath.    Cardiovascular: Negative for chest pain, palpitations and leg swelling.   Gastrointestinal: Negative for diarrhea (Resolved).   Genitourinary: Positive for decreased urine volume, difficulty urinating, frequency and urgency. Negative for dysuria and hematuria.   Musculoskeletal: Negative.    Neurological: Negative for dizziness and weakness.   Psychiatric/Behavioral: Negative.        Compared to one year ago, the patient feels his physical health is better.  Compared to one year ago, the patient feels his mental health is better.    Reviewed chart for potential of high risk medication in the elderly: yes  Reviewed chart for potential of harmful drug interactions in the elderly:yes    Objective         Vitals:    04/12/21 1115   BP: 130/70   BP Location: Left arm   Patient Position: Sitting   Cuff Size: Adult   Pulse: 58   Resp: 15   Temp: 97.2 °F (36.2 °C)   TempSrc: Oral   SpO2: 97%   Weight: 95.6 kg (210 lb 11.2 oz)   Height: 175.3 cm (69.02\")       Body mass index is 31.1 kg/m².  Discussed the patient's BMI with him. The BMI is above average; BMI management plan is completed.    Physical Exam  Constitutional:       General: He is not in acute distress.     Appearance: He is well-developed. He is obese.      Comments: Very pleasant   HENT:      Head: Normocephalic and atraumatic.   Eyes:      Pupils: Pupils are equal, round, and reactive to light.   Neck:      Thyroid: No thyromegaly.      Trachea: Phonation normal.   Cardiovascular:      Rate and Rhythm: Normal rate.   Skin:     General: Skin is warm and dry.      Coloration: Skin is not jaundiced or pale.      Findings: No erythema.   Neurological:      Mental Status: He is alert.   Psychiatric:         Behavior: Behavior normal.         Thought Content: Thought content normal.         Judgment: Judgment " normal.         Lab Results   Component Value Date    HGBA1C 5.7 04/12/2021        Assessment/Plan   Medicare Risks and Personalized Health Plan  CMS Preventative Services Quick Reference  Advance Directive Discussion  Cardiovascular risk  Depression/Dysphoria  Diabetic Lab Screening   Fall Risk  Immunizations Discussed/Encouraged (specific immunizations; COVID-19 )  Inadequate Social Support, Isolation, Loneliness, Lack of Transportation, Financial Difficulties, or Caregiver Stress   Inactivity/Sedentary  Obesity/Overweight   Prostate Cancer Screening     The above risks/problems have been discussed with the patient.  Pertinent information has been shared with the patient in the After Visit Summary.  Follow up plans and orders are seen below in the Assessment/Plan Section.    Diagnoses and all orders for this visit:    1. Medicare annual wellness visit, subsequent (Primary)    2. Type 2 diabetes mellitus with hyperglycemia, without long-term current use of insulin (CMS/[facility])  -     POC Glycosylated Hemoglobin (Hb A1C)  -     insulin lispro (humaLOG) 100 UNIT/ML injection; Inject 7 Units under the skin into the appropriate area as directed 3 (Three) Times a Day With Meals.  Dispense: 10 mL; Refill: 0  -     Microalbumin / Creatinine Urine Ratio - Urine, Clean Catch  -     Semaglutide,0.25 or 0.5MG/DOS, (Ozempic, 0.25 or 0.5 MG/DOSE,) 2 MG/1.5ML solution pen-injector; Inject 0.25 mg under the skin into the appropriate area as directed 1 (One) Time Per Week.  Dispense: 1 pen; Refill: 2  -     insulin detemir (LEVEMIR) 100 UNIT/ML injection; Inject 20 Units under the skin into the appropriate area as directed Every Night.  Dispense: 10 mL; Refill: 5    3. Chronic diastolic CHF (congestive heart failure) (CMS/[facility])    4. Coronary artery disease of native artery of native heart with stable angina pectoris (CMS/[facility])  -     lisinopril (PRINIVIL,ZESTRIL) 5 MG tablet; Take 1 tablet by mouth Daily.  Dispense: 30 tablet; Refill:  3  -     atorvastatin (LIPITOR) 40 MG tablet; Take 1 tablet by mouth Every Night.  Dispense: 30 tablet; Refill: 3    5. BPH with obstruction/lower urinary tract symptoms  -     PSA DIAGNOSTIC; Future  -     tamsulosin (FLOMAX) 0.4 MG capsule 24 hr capsule; Take 1 capsule by mouth Daily.  Dispense: 30 capsule; Refill: 5  -     Urinalysis With Culture If Indicated -; Future    6. Encounter for hepatitis C screening test for low risk patient  -     Hepatitis C Antibody; Future    Other orders  -     metoprolol tartrate (LOPRESSOR) 25 MG tablet; Take 1 tablet by mouth Every 12 (Twelve) Hours.  Dispense: 60 tablet; Refill: 3      His diabetes has improved dramatically.  His A1c is dropped from 14.13 months ago now to 5.7.  I am extremely happy with these results.  I am hopeful now that we can titrate off of his insulin in the maintain on other medications.  I would like to start Ozempic 0.25 mg weekly given the added benefit given his coronary artery disease.  We will decrease his basal insulin to 20 units nightly and his bolus insulin to 5 units before meals    I like to check urinalysis, diagnostic PSA and start Flomax for his urinary symptoms.    Continue current dose of lisinopril and Lipitor for his coronary artery disease.    He is currently on dual antiplatelet therapy for the next 1 year and cannot come off due to his recent ST elevation MI.  Once he is able to come off we will talk about getting a colonoscopy    We discussed COVID-19 vaccine.  He is interested and I was able to schedule him for appointment next Monday, April 19, 2021    Follow Up:  Return in about 6 weeks (around 5/24/2021) for Recheck BS.     An After Visit Summary and PPPS were given to the patient.

## 2021-04-19 ENCOUNTER — APPOINTMENT (OUTPATIENT)
Dept: VACCINE CLINIC | Facility: HOSPITAL | Age: 71
End: 2021-04-19

## 2021-04-22 ENCOUNTER — OFFICE VISIT (OUTPATIENT)
Dept: CARDIOLOGY | Facility: CLINIC | Age: 71
End: 2021-04-22

## 2021-04-22 VITALS
HEART RATE: 55 BPM | OXYGEN SATURATION: 98 % | SYSTOLIC BLOOD PRESSURE: 128 MMHG | DIASTOLIC BLOOD PRESSURE: 68 MMHG | BODY MASS INDEX: 30.96 KG/M2 | WEIGHT: 209 LBS | HEIGHT: 69 IN

## 2021-04-22 DIAGNOSIS — I25.118 CORONARY ARTERY DISEASE OF NATIVE ARTERY OF NATIVE HEART WITH STABLE ANGINA PECTORIS (HCC): Primary | Chronic | ICD-10-CM

## 2021-04-22 DIAGNOSIS — E78.2 MIXED HYPERLIPIDEMIA: Chronic | ICD-10-CM

## 2021-04-22 DIAGNOSIS — I21.11 ST ELEVATION MYOCARDIAL INFARCTION INVOLVING RIGHT CORONARY ARTERY (HCC): Chronic | ICD-10-CM

## 2021-04-22 DIAGNOSIS — E11.65 TYPE 2 DIABETES MELLITUS WITH HYPERGLYCEMIA, WITHOUT LONG-TERM CURRENT USE OF INSULIN (HCC): Chronic | ICD-10-CM

## 2021-04-22 PROCEDURE — 93000 ELECTROCARDIOGRAM COMPLETE: CPT | Performed by: NURSE PRACTITIONER

## 2021-04-22 PROCEDURE — 99214 OFFICE O/P EST MOD 30 MIN: CPT | Performed by: NURSE PRACTITIONER

## 2021-04-22 NOTE — PROGRESS NOTES
"    Subjective:     Encounter Date:04/22/2021      Patient ID: Aren Beltrán is a 70 y.o. male previous medical history of coronary artery disease, inferior ST elevation myocardial infarction Jan 2021, diabetes mellitus with insulin use, hyperlipidemia, and obesity who presents the office today for follow-up of symptoms after the addition of isosorbide mononitrate at his last visit.    Chief Complaint: Symptom follow-up  Coronary Artery Disease  Presents for follow-up visit. Symptoms include chest pain and shortness of breath. Pertinent negatives include no chest pressure, chest tightness, dizziness, palpitations or weight gain. The symptoms have been improving. Compliance with diet is good. Compliance with exercise is good. Compliance with medications is good.     Mr. Beltrán presented last month for follow up.  He reported then that he continued to have some shortness of breath and episodes of chest discomfort.  At that visit, he described his symptoms as \"needing to take a deep breath\" after walking, or even with talking. He denied exertional shortness of breath with treadmill walking or biking, that [place] [facility] [place]  He reported chest pain/pressure when he would lift heavy items, such as carrying the trash to his trash can, or carrying heavy items.  Isosorbide was added at the visit.     Today he presents for follow up of his symptoms.  He has had improvement of his symptoms since the addition of isosorbide.  He tells me that he feels well. He has had episodes that occur at night, he estimates 1-2 times a week, described as chest pain. He feels like when he lies back that he has mild mid sternal chest discomfort - pressure sensation, improved with sitting up. Once he sits up for a while, he lies back down and the sensation does not return.  He denies any shortness of breath with this, feeling smothered, or unable to breath.  He denies any PND.  He previously had noted periods of time that he felt " "that he had to \"take a deep breath\", those have since resolved. He is active in cardiac rehab with a couple weeks and reports no symptoms during exercise. He tolerates the activity well, reports increasing stamina, and denies any shortness of breath or chest pain.  He has found that when he carries heavy items, or his trash, as he had discussed with me previously his symptoms of chest discomfort have improved. He continues to have some times when he does this activity that he has heavy breathing or the feeling of \"a fist in my chest\" but not every time. Overall, his symptoms are reported as much improved.     The following portions of the patient's history were reviewed and updated as appropriate: allergies, current medications, past family history, past medical history, past social history and past surgical history.     No Known Allergies       Current Outpatient Medications:   •  aspirin 81 MG EC tablet, Take 1 tablet by mouth Daily., Disp: 30 tablet, Rfl: 11  •  atorvastatin (LIPITOR) 40 MG tablet, Take 1 tablet by mouth Every Night., Disp: 30 tablet, Rfl: 3  •  glucose blood (Contour Next Test) test strip, Use as instructed, Disp: 200 each, Rfl: 0  •  insulin detemir (LEVEMIR) 100 UNIT/ML injection, Inject 20 Units under the skin into the appropriate area as directed Every Night., Disp: 10 mL, Rfl: 5  •  insulin lispro (humaLOG) 100 UNIT/ML injection, Inject 7 Units under the skin into the appropriate area as directed 3 (Three) Times a Day With Meals., Disp: 10 mL, Rfl: 0  •  isosorbide mononitrate (IMDUR) 30 MG 24 hr tablet, Take 1 tablet by mouth Daily., Disp: 90 tablet, Rfl: 3  •  lisinopril (PRINIVIL,ZESTRIL) 5 MG tablet, Take 1 tablet by mouth Daily., Disp: 30 tablet, Rfl: 3  •  metFORMIN (GLUCOPHAGE) 500 MG tablet, Take 1 tablet by mouth 2 (Two) Times a Day With Meals., Disp: 60 tablet, Rfl: 5  •  metoprolol tartrate (LOPRESSOR) 25 MG tablet, Take 1 tablet by mouth Every 12 (Twelve) Hours., Disp: 60 " "tablet, Rfl: 3  •  Microlet Lancets misc, USE TO CHECK BLOOD SUGAR 4 TIMES A DAY BEFORE MEALS AND AT BEDTIME *INS ONLY PAY FOR 30 DAY SUPPLY*, Disp: 200 each, Rfl: 11  •  nitroglycerin (NITROSTAT) 0.4 MG SL tablet, Place 1 tablet under the tongue Every 5 (Five) Minutes As Needed for Chest Pain. Take no more than 3 doses in 15 minutes., Disp: 25 tablet, Rfl: 12  •  Semaglutide,0.25 or 0.5MG/DOS, (Ozempic, 0.25 or 0.5 MG/DOSE,) 2 MG/1.5ML solution pen-injector, Inject 0.25 mg under the skin into the appropriate area as directed 1 (One) Time Per Week., Disp: 1 pen, Rfl: 2  •  tamsulosin (FLOMAX) 0.4 MG capsule 24 hr capsule, Take 1 capsule by mouth Daily., Disp: 30 capsule, Rfl: 5  •  ticagrelor (BRILINTA) 90 MG tablet tablet, Take 1 tablet by mouth Every 12 (Twelve) Hours., Disp: 60 tablet, Rfl: 11     Review of Systems   Constitutional: Negative for malaise/fatigue, weight gain and weight loss.   Cardiovascular: Positive for chest pain. Negative for dyspnea on exertion, near-syncope and palpitations.   Respiratory: Positive for shortness of breath. Negative for chest tightness and cough.    Hematologic/Lymphatic: Negative for bleeding problem. Does not bruise/bleed easily.   Gastrointestinal: Negative for bloating and nausea.   Neurological: Negative for dizziness, light-headedness and weakness.   Psychiatric/Behavioral: Negative for altered mental status.       ECG 12 Lead    Date/Time: 4/22/2021 11:04 AM  Performed by: Nany Murray APRN  Authorized by: Nany Murray APRN   Comparison: compared with previous ECG from 1/19/2021  Similar to previous ECG  Rhythm: sinus bradycardia  Rate: bradycardic  BPM: 57  Conduction: conduction normal  Q waves: III and aVF    T Waves: T waves normal  QRS axis: normal    Clinical impression: abnormal EKG            /68   Pulse 55   Ht 175.3 cm (69\")   Wt 94.8 kg (209 lb)   SpO2 98%   BMI 30.86 kg/m²        Objective:     Vitals reviewed.   Constitutional:       " General: Not in acute distress.     Appearance: Well-developed, well-groomed and not in distress. Not diaphoretic.   Eyes:      General:         Right eye: No discharge.         Left eye: No discharge.   HENT:      Head: Normocephalic and atraumatic.    Mouth/Throat:      Pharynx: No oropharyngeal exudate.   Pulmonary:      Effort: Pulmonary effort is normal. No respiratory distress.      Breath sounds: Normal breath sounds. No wheezing. No rhonchi. No rales.   Chest:      Chest wall: Not tender to palpatation.   Cardiovascular:      Bradycardia present. Regular rhythm.      Murmurs: There is no murmur.      No gallop. No rub.   Edema:     Peripheral edema absent.   Abdominal:      General: There is no distension.      Palpations: Abdomen is soft.      Tenderness: There is no abdominal tenderness.   Musculoskeletal: Normal range of motion.      Cervical back: Normal range of motion and neck supple. Skin:     General: Skin is warm and dry.      Findings: No erythema.   Neurological:      Mental Status: Alert, oriented to person, place, and time and oriented to person, place and time.   Psychiatric:         Mood and Affect: Mood normal.         Speech: Speech normal.         Behavior: Behavior normal. Behavior is cooperative.         Thought Content: Thought content normal.         Cognition and Memory: Cognition normal.         Judgment: Judgment normal.     Lab Review:   Lab Results   Component Value Date    CHOL 97 04/12/2021    TRIG 41 04/12/2021    HDL 59 04/12/2021    LDL 27 04/12/2021         Results for orders placed during the hospital encounter of 01/01/21    Adult Transthoracic Echo Complete W/ Cont if Necessary Per Protocol    Interpretation Summary  · Left ventricular systolic function is normal. Left ventricular ejection fraction appears to be 56-60%.  · The following left ventricular wall segments are hypokinetic: basal inferolateral and mid inferolateral.  · Left ventricular wall thickness is  consistent with mild concentric hypertrophy.  · Left ventricular diastolic dysfunction is noted.  · There is mild mitral valve prolapse of the anterior mitral leaflet.  · No hemodynamically significant valvular abnormalities identified on this study.    Cardiac catheterization 1/1/2021:  Impression:  1.  Acute inferior STEMI secondary to distal right coronary artery/PL branch occlusion, now status post successful PCI to the culprit vessel with synergy drug-eluting stent, also with successful PCI to the ostial right coronary artery as outlined above with synergy drug-eluting stent.  2.  Moderate to severe stenoses in the left anterior descending artery and left circumflex systems as outlined above.  Results:     Selective Coronary Angiography:     Left Main Coronary Artery: The left main coronary artery arises from the left coronary cusp and bifurcates into the LAD and left circumflex arteries.  There is mild disease noted in left main coronary artery.     Left Anterior Descending Artery: The left anterior descending artery arises from the distal left main coronary artery and supplies a first diagonal branch that appears to have a 60% stenosis in the midportion of this branch.  Following this, there is an additional small caliber diffusely diseased diagonal branch.  Distal to this branch, the LAD is calcified and diffusely diseased with irregularities up to 60 to 70%.  The LAD then terminates by wrapping the apex.      Left Circumflex: The left circumflex artery arises from the distal left main artery and supplies a first obtuse marginal branch which appears to have a hazy calcified 50-60% stenosis in the proximal third of the vessel.  At the takeoff of this branch, there is also at least a 40-50% stenosis.  The circumflex, after the takeoff of the first obtuse marginal branch has an 80% stenosis.  There is then a second obtuse marginal branch which has 2 terminal branches.  These are small caliber branches, however  there is a 60-70% stenosis at the bifurcation of the terminal branches.  There is also an AV groove vessel that has mild disease.     Right Coronary Artery: The right coronary artery arises from the right coronary cusp and has a severe stenosis in the proximal third of the vessel, followed by an aneurysmal segment in the midportion the vessel as well as ectasia distally, along with heavy calcification throughout the course the vessel.  The posterior descending artery is free of any significant disease, however there is an acute occlusion of the PL branch system just after the posterior descending artery takeoff.      Assessment:          Diagnosis Plan   1. Coronary artery disease of native artery of native heart with stable angina pectoris (CMS/[facility])     2. Mixed hyperlipidemia     3. Type 2 diabetes mellitus with hyperglycemia, without long-term current use of insulin (CMS/[facility])     4. ST elevation myocardial infarction involving right coronary artery (CMS/[facility])            Plan:      - Coronary artery disease: Shortness of breath and chest discomfort at last visit and isosorbide was added - See HPI. He has had improvement of symptoms. He still has some episodes from time to time, similar as before, and some pain at night. He would like to continue his current medications and evaluate symptoms again in 6 weeks. This is reasonable. He is to take SL NTG as needed for worsening symptoms and we can discuss increase of his isosorbide at next visit if felt necessary. Continue aspirin, Brilinta, statin, beta blocker.      - Mixed hyperlipidemia: Lipid panel from his inpatient stay revealed an LDL of 72.  Goal LDL with newly diagnosed coronary artery disease is less than 70.  His LDL on recent labs is 27. Continue dietary changes and statin therapy.    - Diabetes mellitus: The patient has been recently diagnosed with diabetes during his hospitalization.  He is establish care with a primary care physician.  His Hgb A1c was >14  at the time of diagnosis, his recent recheck was 5.7%.  Congratulated on his success with management of his diabetes. Continue with dietary changes and recs from PCP.     Follow up in 6 weeks for evaluation of symptoms. Continue current meds. Call sooner with worsening symptoms.

## 2021-05-17 ENCOUNTER — APPOINTMENT (OUTPATIENT)
Dept: VACCINE CLINIC | Facility: HOSPITAL | Age: 71
End: 2021-05-17

## 2021-05-21 ENCOUNTER — TELEPHONE (OUTPATIENT)
Dept: INTERNAL MEDICINE | Facility: CLINIC | Age: 71
End: 2021-05-21

## 2021-05-21 DIAGNOSIS — E11.65 TYPE 2 DIABETES MELLITUS WITH HYPERGLYCEMIA, WITHOUT LONG-TERM CURRENT USE OF INSULIN (HCC): ICD-10-CM

## 2021-05-21 NOTE — TELEPHONE ENCOUNTER
Caller: PEGGY GUSTAFSON    Relationship to patient: Emergency Contact    Best call back number: [phone]     Patient is needing: PATIENT IS REQUESTING MORE REFILLS ADDED TO HIS   insulin lispro (humaLOG) 100 UNIT/ML injection

## 2021-05-26 ENCOUNTER — OFFICE VISIT (OUTPATIENT)
Dept: INTERNAL MEDICINE | Facility: CLINIC | Age: 71
End: 2021-05-26

## 2021-05-26 VITALS
DIASTOLIC BLOOD PRESSURE: 80 MMHG | HEART RATE: 62 BPM | WEIGHT: 208 LBS | TEMPERATURE: 98.1 F | OXYGEN SATURATION: 98 % | SYSTOLIC BLOOD PRESSURE: 135 MMHG | HEIGHT: 69 IN | RESPIRATION RATE: 15 BRPM | BODY MASS INDEX: 30.81 KG/M2

## 2021-05-26 DIAGNOSIS — E11.65 TYPE 2 DIABETES MELLITUS WITH HYPERGLYCEMIA, WITHOUT LONG-TERM CURRENT USE OF INSULIN (HCC): Primary | ICD-10-CM

## 2021-05-26 LAB — GLUCOSE BLDC GLUCOMTR-MCNC: 101 MG/DL (ref 70–130)

## 2021-05-26 PROCEDURE — 99213 OFFICE O/P EST LOW 20 MIN: CPT | Performed by: INTERNAL MEDICINE

## 2021-05-26 PROCEDURE — 82962 GLUCOSE BLOOD TEST: CPT | Performed by: INTERNAL MEDICINE

## 2021-05-26 NOTE — PROGRESS NOTES
Chief Complaint   Patient presents with   • Follow-up   • Diabetes         History:  Aren Beltrán is a 71 y.o. male who presents today for evaluation of the above problems.    He presents today for 6-week follow-up on diabetes.  At the last visit we had decreased his insulin and started Ozempic 0.25 mg weekly.  He took the medication for 3 weeks but he fortunately had to stop due to side effects of severe diarrhea.  He continues with Levemir 20 units at night and Humalog 5 units 3 times a day before meals as well as Metformin 500 mg twice a day.  Blood sugars have been controlled between 98 and 112.  He has changed his diet significantly his A1c is 5.7.  He denies any hypoglycemic episodes    He did cancel his scheduled COVID-19 vaccine because he was nervous      HPI    ROS:  Review of Systems  See above    Current Outpatient Medications:   •  aspirin 81 MG EC tablet, Take 1 tablet by mouth Daily., Disp: 30 tablet, Rfl: 11  •  atorvastatin (LIPITOR) 40 MG tablet, Take 1 tablet by mouth Every Night., Disp: 30 tablet, Rfl: 3  •  glucose blood (Contour Next Test) test strip, Use as instructed, Disp: 200 each, Rfl: 0  •  HumaLOG 100 UNIT/ML injection, INJECT 7 UNITS SUB-Q 3 TIMES A DAY WITH MEALS (Patient taking differently: Inject 5 Units under the skin into the appropriate area as directed 3 (Three) Times a Day Before Meals.), Disp: 10 mL, Rfl: 5  •  insulin detemir (LEVEMIR) 100 UNIT/ML injection, Inject 20 Units under the skin into the appropriate area as directed Every Night., Disp: 10 mL, Rfl: 5  •  isosorbide mononitrate (IMDUR) 30 MG 24 hr tablet, Take 1 tablet by mouth Daily., Disp: 90 tablet, Rfl: 3  •  lisinopril (PRINIVIL,ZESTRIL) 5 MG tablet, Take 1 tablet by mouth Daily. (Patient taking differently: Take 5 mg by mouth Every Other Day.), Disp: 30 tablet, Rfl: 3  •  metFORMIN (GLUCOPHAGE) 500 MG tablet, Take 1 tablet by mouth 2 (Two) Times a Day With Meals., Disp: 60 tablet, Rfl: 5  •  metoprolol tartrate  (LOPRESSOR) 25 MG tablet, Take 1 tablet by mouth Every 12 (Twelve) Hours., Disp: 60 tablet, Rfl: 3  •  Microlet Lancets misc, USE TO CHECK BLOOD SUGAR 4 TIMES A DAY BEFORE MEALS AND AT BEDTIME *INS ONLY PAY FOR 30 DAY SUPPLY*, Disp: 200 each, Rfl: 11  •  nitroglycerin (NITROSTAT) 0.4 MG SL tablet, Place 1 tablet under the tongue Every 5 (Five) Minutes As Needed for Chest Pain. Take no more than 3 doses in 15 minutes., Disp: 25 tablet, Rfl: 12  •  ticagrelor (BRILINTA) 90 MG tablet tablet, Take 1 tablet by mouth Every 12 (Twelve) Hours., Disp: 60 tablet, Rfl: 11    Lab Results   Component Value Date    GLUCOSE 262 (H) 01/03/2021    BUN 22 01/03/2021    CREATININE 0.94 01/03/2021    EGFRIFNONA 80 01/03/2021    EGFRIFAFRI 78 01/01/2021    BCR 23.4 01/03/2021    K 3.9 01/03/2021    CO2 26.0 01/03/2021    CALCIUM 9.2 01/03/2021    ALBUMIN 3.90 01/02/2021    AST 81 (H) 01/02/2021    ALT 13 01/02/2021       WBC   Date Value Ref Range Status   01/03/2021 9.26 3.40 - 10.80 10*3/mm3 Final     RBC   Date Value Ref Range Status   01/03/2021 5.19 4.14 - 5.80 10*6/mm3 Final     Hemoglobin   Date Value Ref Range Status   01/03/2021 14.5 13.0 - 17.7 g/dL Final     Hematocrit   Date Value Ref Range Status   01/03/2021 43.1 37.5 - 51.0 % Final     MCV   Date Value Ref Range Status   01/03/2021 83.0 79.0 - 97.0 fL Final     MCH   Date Value Ref Range Status   01/03/2021 27.9 26.6 - 33.0 pg Final     MCHC   Date Value Ref Range Status   01/03/2021 33.6 31.5 - 35.7 g/dL Final     RDW   Date Value Ref Range Status   01/03/2021 13.2 12.3 - 15.4 % Final     RDW-SD   Date Value Ref Range Status   01/03/2021 39.4 37.0 - 54.0 fl Final     MPV   Date Value Ref Range Status   01/03/2021 10.0 6.0 - 12.0 fL Final     Platelets   Date Value Ref Range Status   01/03/2021 224 140 - 450 10*3/mm3 Final     Neutrophil %   Date Value Ref Range Status   01/01/2021 69.1 42.7 - 76.0 % Final     Lymphocyte %   Date Value Ref Range Status   01/01/2021 19.2  "(L) 19.6 - 45.3 % Final     Monocyte %   Date Value Ref Range Status   01/01/2021 7.7 5.0 - 12.0 % Final     Eosinophil %   Date Value Ref Range Status   01/01/2021 1.9 0.3 - 6.2 % Final     Basophil %   Date Value Ref Range Status   01/01/2021 1.1 0.0 - 1.5 % Final     Immature Grans %   Date Value Ref Range Status   01/01/2021 1.0 (H) 0.0 - 0.5 % Final     Neutrophils, Absolute   Date Value Ref Range Status   01/01/2021 6.04 1.70 - 7.00 10*3/mm3 Final     Lymphocytes, Absolute   Date Value Ref Range Status   01/01/2021 1.68 0.70 - 3.10 10*3/mm3 Final     Monocytes, Absolute   Date Value Ref Range Status   01/01/2021 0.67 0.10 - 0.90 10*3/mm3 Final     Eosinophils, Absolute   Date Value Ref Range Status   01/01/2021 0.17 0.00 - 0.40 10*3/mm3 Final     Basophils, Absolute   Date Value Ref Range Status   01/01/2021 0.10 0.00 - 0.20 10*3/mm3 Final     Immature Grans, Absolute   Date Value Ref Range Status   01/01/2021 0.09 (H) 0.00 - 0.05 10*3/mm3 Final     nRBC   Date Value Ref Range Status   01/01/2021 0.0 0.0 - 0.2 /100 WBC Final         OBJECTIVE:  Visit Vitals  /80 (BP Location: Left arm, Patient Position: Sitting, Cuff Size: Adult)   Pulse 62   Temp 98.1 °F (36.7 °C) (Oral)   Resp 15   Ht 175.3 cm (69.02\")   Wt 94.3 kg (208 lb)   SpO2 98%   BMI 30.70 kg/m²      Physical Exam  Constitutional:       General: He is not in acute distress.     Appearance: Normal appearance. He is well-developed. He is not ill-appearing.      Comments: Pleasant   HENT:      Head: Normocephalic and atraumatic.   Eyes:      Pupils: Pupils are equal, round, and reactive to light.   Neck:      Thyroid: No thyromegaly.      Trachea: Phonation normal.   Pulmonary:      Effort: No respiratory distress.   Skin:     Coloration: Skin is not pale.      Findings: No erythema.   Neurological:      Mental Status: He is alert.   Psychiatric:         Behavior: Behavior normal.         Thought Content: Thought content normal.         Judgment: " Judgment normal.         Assessment/Plan    Diagnoses and all orders for this visit:    1. Type 2 diabetes mellitus with hyperglycemia, without long-term current use of insulin (CMS/[facility]) (Primary)  -     POCT Glucose    His diabetes is well controlled with an A1c of 5.7.  We will continue his current regimen of Levemir 20 units at night, Humalog 5 units before meals, and Metformin 500 mg twice a day.  He did not tolerate Ozempic unfortunately.  We discussed SGLT2 inhibitor but he would like to hold off for now.    We discussed the COVID-19 vaccine.  He canceled his appointment in April.  I encouraged him to get the vaccine as soon as he could as he is very high risk of having severe disease.      Return in about 3 months (around 8/26/2021) for Recheck A1c.    Patient was given instructions and counseling regarding his/her condition or for health maintenance advice. Please see specific information pulled into the AVS if appropriate.     SUNITA Puentes MD   13:20 CDT  5/26/2021

## 2021-06-08 ENCOUNTER — OFFICE VISIT (OUTPATIENT)
Dept: CARDIOLOGY | Facility: CLINIC | Age: 71
End: 2021-06-08

## 2021-06-08 VITALS
HEIGHT: 69 IN | RESPIRATION RATE: 18 BRPM | SYSTOLIC BLOOD PRESSURE: 125 MMHG | OXYGEN SATURATION: 99 % | DIASTOLIC BLOOD PRESSURE: 80 MMHG | BODY MASS INDEX: 30.81 KG/M2 | WEIGHT: 208 LBS | HEART RATE: 64 BPM

## 2021-06-08 DIAGNOSIS — I25.118 CORONARY ARTERY DISEASE OF NATIVE ARTERY OF NATIVE HEART WITH STABLE ANGINA PECTORIS (HCC): Primary | Chronic | ICD-10-CM

## 2021-06-08 DIAGNOSIS — E11.65 TYPE 2 DIABETES MELLITUS WITH HYPERGLYCEMIA, WITHOUT LONG-TERM CURRENT USE OF INSULIN (HCC): Chronic | ICD-10-CM

## 2021-06-08 DIAGNOSIS — E66.9 OBESITY (BMI 30-39.9): ICD-10-CM

## 2021-06-08 DIAGNOSIS — E78.2 MIXED HYPERLIPIDEMIA: Chronic | ICD-10-CM

## 2021-06-08 PROBLEM — R06.02 SHORTNESS OF BREATH: Status: RESOLVED | Noted: 2021-01-19 | Resolved: 2021-06-08

## 2021-06-08 PROBLEM — R42 DIZZINESS: Status: RESOLVED | Noted: 2021-01-19 | Resolved: 2021-06-08

## 2021-06-08 PROCEDURE — 99214 OFFICE O/P EST MOD 30 MIN: CPT | Performed by: NURSE PRACTITIONER

## 2021-06-08 NOTE — PROGRESS NOTES
Subjective:     Encounter Date:06/08/2021      Patient ID: Aren Beltrán is a 71 y.o. male previous medical history of coronary artery disease, inferior ST elevation myocardial infarction Jan 2021, diabetes mellitus with insulin use, hyperlipidemia, and obesity who presents the office today for follow-up of symptoms.    Chief Complaint: Symptom follow-up  Coronary Artery Disease  Presents for follow-up visit. Pertinent negatives include no chest pain, chest pressure, chest tightness, dizziness, palpitations, shortness of breath or weight gain. The symptoms have been improving. Compliance with diet is good. Compliance with exercise is good. Compliance with medications is good.     Mr. Beltrán presents today for follow-up.  He was previously placed on nitrate therapy for management of symptoms of chest pain.  He had improvement of his symptoms but did report an episode of chest pain and shortness of breath at his original follow-up.  The patient wanted to continue his current dose of medications with additional follow-up of symptoms.  At follow-up today, he denies any further episodes of chest discomfort.  He denies any significant shortness of breath.  He feels stable.  He continues to have good control of his blood sugars.  He is very regimented with his diet and medications.  He follows with Dr. Luan Puentes for primary care.  He has been compliant with his dual antiplatelet therapy which was started after stent placement following presentation with acute inferior ST elevation myocardial infarction in January 21.  He does have some concerns of affording Brilinta, he will let us know if he is unable to continue the medication for the duration of the 12 months.  He understands the importance of uninterrupted therapy.      The following portions of the patient's history were reviewed and updated as appropriate: allergies, current medications, past family history, past medical history, past social history and  past surgical history.     Allergies   Allergen Reactions   • Ozempic (0.25 Or 0.5 Mg-Dose) [Semaglutide(0.25 Or 0.5mg-Dos)] Diarrhea          Current Outpatient Medications:   •  aspirin 81 MG EC tablet, Take 1 tablet by mouth Daily., Disp: 30 tablet, Rfl: 11  •  atorvastatin (LIPITOR) 40 MG tablet, Take 1 tablet by mouth Every Night., Disp: 30 tablet, Rfl: 3  •  glucose blood (Contour Next Test) test strip, Use as instructed, Disp: 200 each, Rfl: 0  •  HumaLOG 100 UNIT/ML injection, INJECT 7 UNITS SUB-Q 3 TIMES A DAY WITH MEALS (Patient taking differently: Inject 5 Units under the skin into the appropriate area as directed 3 (Three) Times a Day Before Meals.), Disp: 10 mL, Rfl: 5  •  insulin detemir (LEVEMIR) 100 UNIT/ML injection, Inject 20 Units under the skin into the appropriate area as directed Every Night., Disp: 10 mL, Rfl: 5  •  isosorbide mononitrate (IMDUR) 30 MG 24 hr tablet, Take 1 tablet by mouth Daily., Disp: 90 tablet, Rfl: 3  •  lisinopril (PRINIVIL,ZESTRIL) 5 MG tablet, Take 1 tablet by mouth Daily. (Patient taking differently: Take 5 mg by mouth Daily.), Disp: 30 tablet, Rfl: 3  •  metFORMIN (GLUCOPHAGE) 500 MG tablet, Take 1 tablet by mouth 2 (Two) Times a Day With Meals., Disp: 60 tablet, Rfl: 5  •  metoprolol tartrate (LOPRESSOR) 25 MG tablet, Take 1 tablet by mouth Every 12 (Twelve) Hours., Disp: 60 tablet, Rfl: 3  •  Microlet Lancets misc, USE TO CHECK BLOOD SUGAR 4 TIMES A DAY BEFORE MEALS AND AT BEDTIME *INS ONLY PAY FOR 30 DAY SUPPLY*, Disp: 200 each, Rfl: 11  •  nitroglycerin (NITROSTAT) 0.4 MG SL tablet, Place 1 tablet under the tongue Every 5 (Five) Minutes As Needed for Chest Pain. Take no more than 3 doses in 15 minutes., Disp: 25 tablet, Rfl: 12  •  ticagrelor (BRILINTA) 90 MG tablet tablet, Take 1 tablet by mouth Every 12 (Twelve) Hours., Disp: 60 tablet, Rfl: 11     Review of Systems   Constitutional: Negative for malaise/fatigue, weight gain and weight loss.   Cardiovascular:  "Negative for chest pain, dyspnea on exertion, near-syncope and palpitations.   Respiratory: Negative for chest tightness, cough and shortness of breath.    Hematologic/Lymphatic: Negative for bleeding problem. Does not bruise/bleed easily.   Gastrointestinal: Negative for bloating and nausea.   Neurological: Negative for dizziness, light-headedness and weakness.   Psychiatric/Behavioral: Negative for altered mental status.     Procedures    /80 (BP Location: Right arm, Patient Position: Sitting, Cuff Size: Adult)   Pulse 64   Resp 18   Ht 175.3 cm (69\")   Wt 94.3 kg (208 lb)   SpO2 99%   BMI 30.72 kg/m²        Objective:     Vitals reviewed.   Constitutional:       General: Not in acute distress.     Appearance: Well-developed, well-groomed and not in distress. Not diaphoretic.   Eyes:      General:         Right eye: No discharge.         Left eye: No discharge.   HENT:      Head: Normocephalic and atraumatic.    Mouth/Throat:      Pharynx: No oropharyngeal exudate.   Pulmonary:      Effort: Pulmonary effort is normal. No respiratory distress.      Breath sounds: Normal breath sounds. No wheezing. No rhonchi. No rales.   Chest:      Chest wall: Not tender to palpatation.   Cardiovascular:      Normal rate. Regular rhythm.      Murmurs: There is no murmur.      No gallop. No rub.   Edema:     Peripheral edema absent.   Abdominal:      General: There is no distension.      Palpations: Abdomen is soft.      Tenderness: There is no abdominal tenderness.   Musculoskeletal: Normal range of motion.      Cervical back: Normal range of motion and neck supple. Skin:     General: Skin is warm and dry.      Findings: No erythema.   Neurological:      Mental Status: Alert, oriented to person, place, and time and oriented to person, place and time.   Psychiatric:         Mood and Affect: Mood normal.         Speech: Speech normal.         Behavior: Behavior normal. Behavior is cooperative.         Thought Content: " Thought content normal.         Cognition and Memory: Cognition normal.         Judgment: Judgment normal.     Lab Review:   Lab Results   Component Value Date    CHOL 97 04/12/2021    TRIG 41 04/12/2021    HDL 59 04/12/2021    LDL 27 04/12/2021         Results for orders placed during the hospital encounter of 01/01/21    Adult Transthoracic Echo Complete W/ Cont if Necessary Per Protocol    Interpretation Summary  · Left ventricular systolic function is normal. Left ventricular ejection fraction appears to be 56-60%.  · The following left ventricular wall segments are hypokinetic: basal inferolateral and mid inferolateral.  · Left ventricular wall thickness is consistent with mild concentric hypertrophy.  · Left ventricular diastolic dysfunction is noted.  · There is mild mitral valve prolapse of the anterior mitral leaflet.  · No hemodynamically significant valvular abnormalities identified on this study.    Cardiac catheterization 1/1/2021:  Impression:  1.  Acute inferior STEMI secondary to distal right coronary artery/PL branch occlusion, now status post successful PCI to the culprit vessel with synergy drug-eluting stent, also with successful PCI to the ostial right coronary artery as outlined above with synergy drug-eluting stent.  2.  Moderate to severe stenoses in the left anterior descending artery and left circumflex systems as outlined above.  Results:     Selective Coronary Angiography:     Left Main Coronary Artery: The left main coronary artery arises from the left coronary cusp and bifurcates into the LAD and left circumflex arteries.  There is mild disease noted in left main coronary artery.     Left Anterior Descending Artery: The left anterior descending artery arises from the distal left main coronary artery and supplies a first diagonal branch that appears to have a 60% stenosis in the midportion of this branch.  Following this, there is an additional small caliber diffusely diseased diagonal  branch.  Distal to this branch, the LAD is calcified and diffusely diseased with irregularities up to 60 to 70%.  The LAD then terminates by wrapping the apex.      Left Circumflex: The left circumflex artery arises from the distal left main artery and supplies a first obtuse marginal branch which appears to have a hazy calcified 50-60% stenosis in the proximal third of the vessel.  At the takeoff of this branch, there is also at least a 40-50% stenosis.  The circumflex, after the takeoff of the first obtuse marginal branch has an 80% stenosis.  There is then a second obtuse marginal branch which has 2 terminal branches.  These are small caliber branches, however there is a 60-70% stenosis at the bifurcation of the terminal branches.  There is also an AV groove vessel that has mild disease.     Right Coronary Artery: The right coronary artery arises from the right coronary cusp and has a severe stenosis in the proximal third of the vessel, followed by an aneurysmal segment in the midportion the vessel as well as ectasia distally, along with heavy calcification throughout the course the vessel.  The posterior descending artery is free of any significant disease, however there is an acute occlusion of the PL branch system just after the posterior descending artery takeoff.      Assessment:          Diagnosis Plan   1. Coronary artery disease of native artery of native heart with stable angina pectoris (CMS/[facility])     2. Mixed hyperlipidemia     3. Type 2 diabetes mellitus with hyperglycemia, without long-term current use of insulin (CMS/[facility])     4. Obesity (BMI 30-39.9)            Plan:      - Coronary artery disease: He has had improvement since starting Imdur and has had complete resolution of chest discomfort.  He has had no recent episodes of chest pain.  He denies any significant shortness of breath.  No changes at this time.  Continue isosorbide mononitrate 30 mg daily.  Continue aspirin, Brilinta, statin, beta  blocker.      - Mixed hyperlipidemia: Lipid panel from his inpatient stay revealed an LDL of 72.  Goal LDL with newly diagnosed coronary artery disease is less than 70.  His LDL on recent labs is 27. Continue current treatment.    - Diabetes mellitus: His Hgb A1c was >14 at the time of diagnosis, his recent recheck was 5.7%.  Congratulated on his success with management of his diabetes. Continue with dietary changes and recs from PCP.     -Obesity: BMI 30.72.  Continue monitoring diet. Cardiac ADA diet.     Follow-up in the office in 6 months.  Call our office sooner with recurrence of symptoms.  Call sooner if patient cannot afford ongoing prescriptions of Brilinta as he must remain on dual antiplatelet therapy until January 2022.

## 2021-08-27 ENCOUNTER — OFFICE VISIT (OUTPATIENT)
Dept: INTERNAL MEDICINE | Facility: CLINIC | Age: 71
End: 2021-08-27

## 2021-08-27 VITALS
OXYGEN SATURATION: 98 % | WEIGHT: 207.6 LBS | SYSTOLIC BLOOD PRESSURE: 127 MMHG | BODY MASS INDEX: 30.75 KG/M2 | RESPIRATION RATE: 15 BRPM | DIASTOLIC BLOOD PRESSURE: 85 MMHG | HEART RATE: 62 BPM | TEMPERATURE: 97.9 F | HEIGHT: 69 IN

## 2021-08-27 DIAGNOSIS — E11.65 TYPE 2 DIABETES MELLITUS WITH HYPERGLYCEMIA, WITHOUT LONG-TERM CURRENT USE OF INSULIN (HCC): Primary | ICD-10-CM

## 2021-08-27 DIAGNOSIS — E78.2 MIXED HYPERLIPIDEMIA: Chronic | ICD-10-CM

## 2021-08-27 DIAGNOSIS — I25.118 CORONARY ARTERY DISEASE OF NATIVE ARTERY OF NATIVE HEART WITH STABLE ANGINA PECTORIS (HCC): Chronic | ICD-10-CM

## 2021-08-27 LAB — HBA1C MFR BLD: 5.5 %

## 2021-08-27 PROCEDURE — 99214 OFFICE O/P EST MOD 30 MIN: CPT | Performed by: INTERNAL MEDICINE

## 2021-08-27 PROCEDURE — 83036 HEMOGLOBIN GLYCOSYLATED A1C: CPT | Performed by: INTERNAL MEDICINE

## 2021-08-27 RX ORDER — LISINOPRIL 2.5 MG/1
2.5 TABLET ORAL DAILY
Qty: 30 TABLET | Refills: 5 | Status: SHIPPED | OUTPATIENT
Start: 2021-08-27 | End: 2022-01-26 | Stop reason: SDUPTHER

## 2021-08-27 RX ORDER — ATORVASTATIN CALCIUM 20 MG/1
20 TABLET, FILM COATED ORAL NIGHTLY
Qty: 30 TABLET | Refills: 5 | Status: SHIPPED | OUTPATIENT
Start: 2021-08-27 | End: 2022-01-24 | Stop reason: SDUPTHER

## 2021-08-27 NOTE — PROGRESS NOTES
Chief Complaint   Patient presents with   • Follow-up   • Diabetes         History:  Aren Beltrán is a 71 y.o. male who presents today for evaluation of the above problems.    Presents for follow-up.  He is doing very well overall.  He is taking lisinopril and Lipitor every other day due to feeling like he would pass out taking them on a daily basis.  Denies any low blood sugars except for one time it was 68.  His A1c today is 5.5.    He has not had the COVID-19 vaccine yet but is planning to get it      HPI      ROS:  Review of Systems   Constitutional: Negative for fatigue.   Respiratory: Negative for shortness of breath.    Cardiovascular: Negative for chest pain, palpitations and leg swelling.         Current Outpatient Medications:   •  aspirin 81 MG EC tablet, Take 1 tablet by mouth Daily., Disp: 30 tablet, Rfl: 11  •  atorvastatin (LIPITOR) 20 MG tablet, Take 1 tablet by mouth Every Night., Disp: 30 tablet, Rfl: 5  •  glucose blood (Contour Next Test) test strip, Use as instructed, Disp: 200 each, Rfl: 0  •  HumaLOG 100 UNIT/ML injection, INJECT 7 UNITS SUB-Q 3 TIMES A DAY WITH MEALS (Patient taking differently: Inject 5 Units under the skin into the appropriate area as directed 3 (Three) Times a Day Before Meals.), Disp: 10 mL, Rfl: 5  •  insulin detemir (LEVEMIR) 100 UNIT/ML injection, Inject 20 Units under the skin into the appropriate area as directed Every Night., Disp: 10 mL, Rfl: 5  •  lisinopril (PRINIVIL,ZESTRIL) 2.5 MG tablet, Take 1 tablet by mouth Daily., Disp: 30 tablet, Rfl: 5  •  metFORMIN (GLUCOPHAGE) 500 MG tablet, Take 1 tablet by mouth 2 (Two) Times a Day With Meals., Disp: 180 tablet, Rfl: 1  •  metoprolol tartrate (LOPRESSOR) 25 MG tablet, Take 1 tablet by mouth Every 12 (Twelve) Hours., Disp: 60 tablet, Rfl: 3  •  Microlet Lancets misc, USE TO CHECK BLOOD SUGAR 4 TIMES A DAY BEFORE MEALS AND AT BEDTIME *INS ONLY PAY FOR 30 DAY SUPPLY*, Disp: 200 each, Rfl: 11  •  nitroglycerin  (NITROSTAT) 0.4 MG SL tablet, Place 1 tablet under the tongue Every 5 (Five) Minutes As Needed for Chest Pain. Take no more than 3 doses in 15 minutes., Disp: 25 tablet, Rfl: 12  •  ticagrelor (BRILINTA) 90 MG tablet tablet, Take 1 tablet by mouth Every 12 (Twelve) Hours., Disp: 60 tablet, Rfl: 11    Lab Results   Component Value Date    GLUCOSE 262 (H) 01/03/2021    BUN 22 01/03/2021    CREATININE 0.94 01/03/2021    EGFRIFNONA 80 01/03/2021    EGFRIFAFRI 78 01/01/2021    BCR 23.4 01/03/2021    K 3.9 01/03/2021    CO2 26.0 01/03/2021    CALCIUM 9.2 01/03/2021    ALBUMIN 3.90 01/02/2021    AST 81 (H) 01/02/2021    ALT 13 01/02/2021       WBC   Date Value Ref Range Status   01/03/2021 9.26 3.40 - 10.80 10*3/mm3 Final     RBC   Date Value Ref Range Status   01/03/2021 5.19 4.14 - 5.80 10*6/mm3 Final     Hemoglobin   Date Value Ref Range Status   01/03/2021 14.5 13.0 - 17.7 g/dL Final     Hematocrit   Date Value Ref Range Status   01/03/2021 43.1 37.5 - 51.0 % Final     MCV   Date Value Ref Range Status   01/03/2021 83.0 79.0 - 97.0 fL Final     MCH   Date Value Ref Range Status   01/03/2021 27.9 26.6 - 33.0 pg Final     MCHC   Date Value Ref Range Status   01/03/2021 33.6 31.5 - 35.7 g/dL Final     RDW   Date Value Ref Range Status   01/03/2021 13.2 12.3 - 15.4 % Final     RDW-SD   Date Value Ref Range Status   01/03/2021 39.4 37.0 - 54.0 fl Final     MPV   Date Value Ref Range Status   01/03/2021 10.0 6.0 - 12.0 fL Final     Platelets   Date Value Ref Range Status   01/03/2021 224 140 - 450 10*3/mm3 Final     Neutrophil %   Date Value Ref Range Status   01/01/2021 69.1 42.7 - 76.0 % Final     Lymphocyte %   Date Value Ref Range Status   01/01/2021 19.2 (L) 19.6 - 45.3 % Final     Monocyte %   Date Value Ref Range Status   01/01/2021 7.7 5.0 - 12.0 % Final     Eosinophil %   Date Value Ref Range Status   01/01/2021 1.9 0.3 - 6.2 % Final     Basophil %   Date Value Ref Range Status   01/01/2021 1.1 0.0 - 1.5 % Final  "    Immature Grans %   Date Value Ref Range Status   01/01/2021 1.0 (H) 0.0 - 0.5 % Final     Neutrophils, Absolute   Date Value Ref Range Status   01/01/2021 6.04 1.70 - 7.00 10*3/mm3 Final     Lymphocytes, Absolute   Date Value Ref Range Status   01/01/2021 1.68 0.70 - 3.10 10*3/mm3 Final     Monocytes, Absolute   Date Value Ref Range Status   01/01/2021 0.67 0.10 - 0.90 10*3/mm3 Final     Eosinophils, Absolute   Date Value Ref Range Status   01/01/2021 0.17 0.00 - 0.40 10*3/mm3 Final     Basophils, Absolute   Date Value Ref Range Status   01/01/2021 0.10 0.00 - 0.20 10*3/mm3 Final     Immature Grans, Absolute   Date Value Ref Range Status   01/01/2021 0.09 (H) 0.00 - 0.05 10*3/mm3 Final     nRBC   Date Value Ref Range Status   01/01/2021 0.0 0.0 - 0.2 /100 WBC Final         OBJECTIVE:  Visit Vitals  /85 (BP Location: Left arm, Patient Position: Sitting, Cuff Size: Adult)   Pulse 62   Temp 97.9 °F (36.6 °C) (Oral)   Resp 15   Ht 175.3 cm (69.02\")   Wt 94.2 kg (207 lb 9.6 oz)   SpO2 98%   BMI 30.64 kg/m²      Physical Exam  Vitals reviewed.   Constitutional:       General: He is not in acute distress.     Appearance: Normal appearance. He is well-developed.      Comments: Pleasant   HENT:      Head: Normocephalic and atraumatic.   Eyes:      Pupils: Pupils are equal, round, and reactive to light.   Neck:      Thyroid: No thyromegaly.      Trachea: Phonation normal.   Pulmonary:      Effort: No respiratory distress.   Skin:     Coloration: Skin is not pale.      Findings: No erythema.   Neurological:      Mental Status: He is alert.   Psychiatric:         Behavior: Behavior normal.         Thought Content: Thought content normal.         Judgment: Judgment normal.         Assessment/Plan    Diagnoses and all orders for this visit:    1. Type 2 diabetes mellitus with hyperglycemia, without long-term current use of insulin (CMS/[facility]) (Primary)  -     POC Glycosylated Hemoglobin (Hb A1C)    2. Coronary artery " disease of native artery of native heart with stable angina pectoris (CMS/[facility])  -     atorvastatin (LIPITOR) 20 MG tablet; Take 1 tablet by mouth Every Night.  Dispense: 30 tablet; Refill: 5  -     lisinopril (PRINIVIL,ZESTRIL) 2.5 MG tablet; Take 1 tablet by mouth Daily.  Dispense: 30 tablet; Refill: 5    3. Mixed hyperlipidemia      Diabetes is very well controlled with his current regimen with an A1c of 5.5.  He did not tolerate Ozempic and this was discontinued.    I do not think he needs any labs at this time.  Developed side effects with Lipitor 40 mg daily and lisinopril 5 mg daily.  Therefore I will decrease his Lipitor to 20 mg daily and lisinopril 2.5 mg daily.  I realize that the high intensity statin would be indicated given his coronary artery disease, but his last LDL April 12, 2021 was too low at 27 and has been having side effects with Lipitor 40 mg daily    Follow-up 6 months or sooner if indicated.  We will check labs at that time    Counseled on the COVID-19 vaccine and he is planning on getting the vaccine later today at [facility].    Return in about 6 months (around 2/27/2022) for Recheck.      SUNITA Puentes MD  11:36 CDT  8/27/2021

## 2021-10-09 DIAGNOSIS — E11.65 TYPE 2 DIABETES MELLITUS WITH HYPERGLYCEMIA, WITHOUT LONG-TERM CURRENT USE OF INSULIN (HCC): ICD-10-CM

## 2021-10-11 RX ORDER — INSULIN DETEMIR 100 [IU]/ML
30 INJECTION, SOLUTION SUBCUTANEOUS NIGHTLY
Qty: 10 ML | Refills: 5 | Status: SHIPPED | OUTPATIENT
Start: 2021-10-11 | End: 2021-12-08 | Stop reason: DRUGHIGH

## 2021-12-08 ENCOUNTER — OFFICE VISIT (OUTPATIENT)
Dept: CARDIOLOGY | Facility: CLINIC | Age: 71
End: 2021-12-08

## 2021-12-08 VITALS
OXYGEN SATURATION: 99 % | DIASTOLIC BLOOD PRESSURE: 78 MMHG | RESPIRATION RATE: 18 BRPM | BODY MASS INDEX: 30.81 KG/M2 | HEART RATE: 61 BPM | SYSTOLIC BLOOD PRESSURE: 128 MMHG | WEIGHT: 208 LBS | HEIGHT: 69 IN

## 2021-12-08 DIAGNOSIS — E78.2 MIXED HYPERLIPIDEMIA: Chronic | ICD-10-CM

## 2021-12-08 DIAGNOSIS — E66.9 OBESITY (BMI 30-39.9): ICD-10-CM

## 2021-12-08 DIAGNOSIS — E11.65 TYPE 2 DIABETES MELLITUS WITH HYPERGLYCEMIA, WITHOUT LONG-TERM CURRENT USE OF INSULIN (HCC): Chronic | ICD-10-CM

## 2021-12-08 DIAGNOSIS — I25.118 CORONARY ARTERY DISEASE OF NATIVE ARTERY OF NATIVE HEART WITH STABLE ANGINA PECTORIS (HCC): Primary | Chronic | ICD-10-CM

## 2021-12-08 PROCEDURE — 99214 OFFICE O/P EST MOD 30 MIN: CPT | Performed by: NURSE PRACTITIONER

## 2021-12-08 PROCEDURE — 93000 ELECTROCARDIOGRAM COMPLETE: CPT | Performed by: NURSE PRACTITIONER

## 2021-12-08 RX ORDER — INSULIN DETEMIR 100 [IU]/ML
20 INJECTION, SOLUTION SUBCUTANEOUS DAILY
COMMUNITY
End: 2022-11-08

## 2021-12-08 NOTE — PROGRESS NOTES
Subjective:     Encounter Date:12/8/2021      Patient ID: Aren Beltrán is a 71 y.o. male known coronary artery disease, inferior ST elevation myocardial infarction Jan 2021, type II diabetes mellitus with insulin use, hyperlipidemia, and obesity who presents the office today for follow-up.    Chief Complaint:   Coronary Artery Disease  Presents for follow-up visit. Pertinent negatives include no chest pressure, chest tightness or weight gain. The symptoms have been improving. Compliance with diet is good. Compliance with exercise is good. Compliance with medications is good.     Mr. Beltrán presents today for follow-up.  He was previously placed on nitrate therapy for management of symptoms of chest pain.  He had improvement of his symptoms but did report an episode of chest pain and shortness of breath at his original follow-up.  The patient wanted to continue his current dose of medications with additional follow-up of symptoms.  At a later follow-up in June 2021, he denied any further episodes of chest discomfort.     Since the office visit in June the patient has had some adjustments to his medications.  He follows with his primary care physician who made adjustments to his statin and ACE inhibitor due to complaints of feeling as if he was going to pass out.  He is also now off of isosorbide.    Currently he denies any symptoms of near syncope.  He is on low-dose lisinopril and has reported that his blood pressure is slowly been creeping up but still remains well controlled.  His blood sugars are stable and he remains on insulin.  He denies any significant episodes of chest discomfort but reports an episode of left-sided chest pain since his previous visit resolved with use of nitroglycerin but felt, to the patient, to be related to indigestion.  He attributes this to his significant change in eating habits since his heart attack.  He denies any ongoing symptoms of chest discomfort.  He denies any  shortness of breath or dyspnea on exertion.      The following portions of the patient's history were reviewed and updated as appropriate: allergies, current medications, past family history, past medical history, past social history and past surgical history.     Allergies   Allergen Reactions   • Ozempic (0.25 Or 0.5 Mg-Dose) [Semaglutide(0.25 Or 0.5mg-Dos)] Diarrhea          Current Outpatient Medications:   •  aspirin 81 MG EC tablet, Take 1 tablet by mouth Daily., Disp: 30 tablet, Rfl: 11  •  atorvastatin (LIPITOR) 20 MG tablet, Take 1 tablet by mouth Every Night., Disp: 30 tablet, Rfl: 5  •  glucose blood (Contour Next Test) test strip, Use as instructed, Disp: 200 each, Rfl: 0  •  HumaLOG 100 UNIT/ML injection, INJECT 7 UNITS SUB-Q 3 TIMES A DAY WITH MEALS (Patient taking differently: Inject 5 Units under the skin into the appropriate area as directed 3 (Three) Times a Day Before Meals.), Disp: 10 mL, Rfl: 5  •  insulin detemir (Levemir) 100 UNIT/ML injection, Inject 20 Units under the skin into the appropriate area as directed Daily., Disp: , Rfl:   •  lisinopril (PRINIVIL,ZESTRIL) 2.5 MG tablet, Take 1 tablet by mouth Daily., Disp: 30 tablet, Rfl: 5  •  metFORMIN (GLUCOPHAGE) 500 MG tablet, Take 1 tablet by mouth 2 (Two) Times a Day With Meals., Disp: 180 tablet, Rfl: 1  •  metoprolol tartrate (LOPRESSOR) 25 MG tablet, TAKE 1 TABLET BY MOUTH EVERY 12 HOURS, Disp: 180 tablet, Rfl: 1  •  Microlet Lancets misc, USE TO CHECK BLOOD SUGAR 4 TIMES A DAY BEFORE MEALS AND AT BEDTIME *INS ONLY PAY FOR 30 DAY SUPPLY*, Disp: 200 each, Rfl: 11  •  nitroglycerin (NITROSTAT) 0.4 MG SL tablet, Place 1 tablet under the tongue Every 5 (Five) Minutes As Needed for Chest Pain. Take no more than 3 doses in 15 minutes., Disp: 25 tablet, Rfl: 12  •  ticagrelor (BRILINTA) 90 MG tablet tablet, Take 1 tablet by mouth Every 12 (Twelve) Hours., Disp: 60 tablet, Rfl: 11     Review of Systems   Constitutional: Negative for weight gain  "and weight loss.   Cardiovascular: Negative for dyspnea on exertion.   Respiratory: Negative for chest tightness.    Hematologic/Lymphatic: Negative for bleeding problem. Does not bruise/bleed easily.   Gastrointestinal: Negative for bloating.   Neurological: Negative for light-headedness.   Psychiatric/Behavioral: Negative for altered mental status.       ECG 12 Lead    Date/Time: 12/8/2021 12:00 PM  Performed by: Nany Murray APRN  Authorized by: Nany Murray APRN   Comparison: compared with previous ECG from 4/22/2021  Similar to previous ECG  Rhythm: sinus rhythm  Rate: normal  BPM: 61  Conduction: conduction normal  QRS axis: left    Clinical impression: normal ECG            /78 (BP Location: Right arm, Patient Position: Sitting, Cuff Size: Adult)   Pulse 61   Resp 18   Ht 175.3 cm (69\")   Wt 94.3 kg (208 lb)   SpO2 99%   BMI 30.72 kg/m²        Objective:     Vitals reviewed.   Constitutional:       General: Not in acute distress.     Appearance: Well-developed, well-groomed and not in distress. Not diaphoretic.   Eyes:      General:         Right eye: No discharge.         Left eye: No discharge.   HENT:      Head: Normocephalic and atraumatic.    Mouth/Throat:      Pharynx: No oropharyngeal exudate.   Pulmonary:      Effort: Pulmonary effort is normal. No respiratory distress.      Breath sounds: Normal breath sounds. No wheezing. No rhonchi. No rales.   Chest:      Chest wall: Not tender to palpatation.   Cardiovascular:      Normal rate. Regular rhythm.      Murmurs: There is no murmur.      No gallop. No rub.   Edema:     Peripheral edema absent.   Abdominal:      General: There is no distension.      Palpations: Abdomen is soft.      Tenderness: There is no abdominal tenderness.   Musculoskeletal: Normal range of motion.      Cervical back: Normal range of motion and neck supple. Skin:     General: Skin is warm and dry.      Findings: No erythema.   Neurological:      Mental Status: " Alert, oriented to person, place, and time and oriented to person, place and time.   Psychiatric:         Mood and Affect: Mood normal.         Speech: Speech normal.         Behavior: Behavior normal. Behavior is cooperative.         Thought Content: Thought content normal.         Cognition and Memory: Cognition normal.         Judgment: Judgment normal.     Lab Review:   Lab Results   Component Value Date    CHOL 97 04/12/2021    TRIG 41 04/12/2021    HDL 59 04/12/2021    LDL 27 04/12/2021         Results for orders placed during the hospital encounter of 01/01/21    Adult Transthoracic Echo Complete W/ Cont if Necessary Per Protocol    Interpretation Summary  · Left ventricular systolic function is normal. Left ventricular ejection fraction appears to be 56-60%.  · The following left ventricular wall segments are hypokinetic: basal inferolateral and mid inferolateral.  · Left ventricular wall thickness is consistent with mild concentric hypertrophy.  · Left ventricular diastolic dysfunction is noted.  · There is mild mitral valve prolapse of the anterior mitral leaflet.  · No hemodynamically significant valvular abnormalities identified on this study.    Cardiac catheterization 1/1/2021:  Impression:  1.  Acute inferior STEMI secondary to distal right coronary artery/PL branch occlusion, now status post successful PCI to the culprit vessel with synergy drug-eluting stent, also with successful PCI to the ostial right coronary artery as outlined above with synergy drug-eluting stent.  2.  Moderate to severe stenoses in the left anterior descending artery and left circumflex systems as outlined above.  Results:     Selective Coronary Angiography:     Left Main Coronary Artery: The left main coronary artery arises from the left coronary cusp and bifurcates into the LAD and left circumflex arteries.  There is mild disease noted in left main coronary artery.     Left Anterior Descending Artery: The left anterior  descending artery arises from the distal left main coronary artery and supplies a first diagonal branch that appears to have a 60% stenosis in the midportion of this branch.  Following this, there is an additional small caliber diffusely diseased diagonal branch.  Distal to this branch, the LAD is calcified and diffusely diseased with irregularities up to 60 to 70%.  The LAD then terminates by wrapping the apex.      Left Circumflex: The left circumflex artery arises from the distal left main artery and supplies a first obtuse marginal branch which appears to have a hazy calcified 50-60% stenosis in the proximal third of the vessel.  At the takeoff of this branch, there is also at least a 40-50% stenosis.  The circumflex, after the takeoff of the first obtuse marginal branch has an 80% stenosis.  There is then a second obtuse marginal branch which has 2 terminal branches.  These are small caliber branches, however there is a 60-70% stenosis at the bifurcation of the terminal branches.  There is also an AV groove vessel that has mild disease.     Right Coronary Artery: The right coronary artery arises from the right coronary cusp and has a severe stenosis in the proximal third of the vessel, followed by an aneurysmal segment in the midportion the vessel as well as ectasia distally, along with heavy calcification throughout the course the vessel.  The posterior descending artery is free of any significant disease, however there is an acute occlusion of the PL branch system just after the posterior descending artery takeoff.      Assessment:          Diagnosis Plan   1. Coronary artery disease of native artery of native heart with stable angina pectoris (HCC)     2. Mixed hyperlipidemia     3. Type 2 diabetes mellitus with hyperglycemia, without long-term current use of insulin (HCC)     4. Obesity (BMI 30-39.9)            Plan:      -Coronary artery disease: He has had improvement since starting Imdur and has had  complete resolution of chest discomfort.  However he is since stopped the medication due to side effects.  I am not exactly sure of what complaints he had although he had reported complaints to his primary care physician who had noted changes to his statin and lisinopril doses.  In any event, the patient reports 1 episode of chest pain since his previous visit that he relates to indigestion.  He has no other progressive symptoms and denies any shortness of breath.  No changes at this time. Continue aspirin, Brilinta, statin, beta blocker.   He will complete his 12-month dual antiplatelet therapy on 1/1/2022.  We will make no changes to his medications at this time due to 1 isolated event of chest discomfort.  Will monitor his symptoms and he is to call us if they progress or worsen prior to his follow-up.  He is also on over-the-counter omeprazole that he takes intermittently.    -Mixed hyperlipidemia:Goal LDL with newly diagnosed coronary artery disease is less than 70.  His LDL on recent labs is 27. Continue current treatment.    -Diabetes mellitus: His Hgb A1c was >14 at the time of diagnosis, his recent recheck was 5.5%.  Continue with      -Obesity: BMI 30.72.  Continue monitoring diet. Cardiac ADA diet.     Follow-up in the office in 6 months.  Call our office sooner with recurrence of symptoms. .

## 2022-01-03 ENCOUNTER — TELEPHONE (OUTPATIENT)
Dept: INTERNAL MEDICINE | Facility: CLINIC | Age: 72
End: 2022-01-03

## 2022-01-03 ENCOUNTER — TELEPHONE (OUTPATIENT)
Dept: CARDIOLOGY | Facility: CLINIC | Age: 72
End: 2022-01-03
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
## 2022-01-03 NOTE — TELEPHONE ENCOUNTER
Patient called and left a voicemail wanting to provide updated registration information.  He can be reached at [phone].  Thank you!

## 2022-01-03 NOTE — TELEPHONE ENCOUNTER
Caller: Aren Beltrán    Relationship: Self        What is the best time to reach you: ANYTIME     What was the call regarding: MEDICATIONS ARE TO EXPENSIVE NEEDING DIFFERENT MED.      Do you require a callback: YES

## 2022-01-04 ENCOUNTER — TELEPHONE (OUTPATIENT)
Dept: INTERNAL MEDICINE | Facility: CLINIC | Age: 72
End: 2022-01-04
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
## 2022-01-05 NOTE — TELEPHONE ENCOUNTER
It's part of a law that went into effect this year.    https://[email or web address]/news/best-states/[place]/articles/2022-01-01/ssbtlyzz-brw-kpmeptp-insulin-cost-for-vxwd-blpr-puwt-effect

## 2022-01-18 DIAGNOSIS — E11.65 TYPE 2 DIABETES MELLITUS WITH HYPERGLYCEMIA, WITHOUT LONG-TERM CURRENT USE OF INSULIN: ICD-10-CM

## 2022-01-18 NOTE — TELEPHONE ENCOUNTER
Caller: Aren Beltrán    Relationship: Self    Best call back number: [phone]     Requested Prescriptions:   Requested Prescriptions     Pending Prescriptions Disp Refills   • metoprolol tartrate (LOPRESSOR) 25 MG tablet [Pharmacy Med Name: METOPROLOL TARTRATE 25 MG TAB] 180 tablet 1     Sig: TAKE 1 TABLET BY MOUTH EVERY 12 HOURS   • insulin lispro (HumaLOG) 100 UNIT/ML injection 10 mL 5        Pharmacy where request should be sent: [facility]/PHARMACY #[id number] - [address] [phone] [facility] [phone] FX     Additional details provided by patient: COMPLETELY OUT     Does the patient have less than a 3 day supply:  [x] Yes  [] No    Elva Montelongo Rep   01/18/22 10:14 CST

## 2022-01-24 ENCOUNTER — OFFICE VISIT (OUTPATIENT)
Dept: INTERNAL MEDICINE | Facility: CLINIC | Age: 72
End: 2022-01-24

## 2022-01-24 VITALS
HEART RATE: 60 BPM | DIASTOLIC BLOOD PRESSURE: 76 MMHG | HEIGHT: 69 IN | SYSTOLIC BLOOD PRESSURE: 128 MMHG | OXYGEN SATURATION: 98 % | BODY MASS INDEX: 30.9 KG/M2 | WEIGHT: 208.6 LBS

## 2022-01-24 DIAGNOSIS — I25.118 CORONARY ARTERY DISEASE OF NATIVE ARTERY OF NATIVE HEART WITH STABLE ANGINA PECTORIS: Chronic | ICD-10-CM

## 2022-01-24 DIAGNOSIS — R10.821 RIGHT UPPER QUADRANT ABDOMINAL TENDERNESS WITH REBOUND TENDERNESS: ICD-10-CM

## 2022-01-24 DIAGNOSIS — R53.1 WEAKNESS: Primary | ICD-10-CM

## 2022-01-24 DIAGNOSIS — R68.83 CHILLS (WITHOUT FEVER): ICD-10-CM

## 2022-01-24 PROCEDURE — 99214 OFFICE O/P EST MOD 30 MIN: CPT | Performed by: NURSE PRACTITIONER

## 2022-01-24 RX ORDER — ATORVASTATIN CALCIUM 20 MG/1
20 TABLET, FILM COATED ORAL
Qty: 90 TABLET | Refills: 1 | Status: SHIPPED | OUTPATIENT
Start: 2022-01-24 | End: 2022-09-01
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
## 2022-01-26 DIAGNOSIS — I25.118 CORONARY ARTERY DISEASE OF NATIVE ARTERY OF NATIVE HEART WITH STABLE ANGINA PECTORIS: Chronic | ICD-10-CM

## 2022-01-26 RX ORDER — LISINOPRIL 2.5 MG/1
2.5 TABLET ORAL DAILY
Qty: 30 TABLET | Refills: 5 | Status: SHIPPED | OUTPATIENT
Start: 2022-01-26 | End: 2022-08-26 | Stop reason: SDUPTHER

## 2022-01-26 NOTE — TELEPHONE ENCOUNTER
Caller: Aren Beltrán    Relationship: Self    Best call back number: [phone]     Requested Prescriptions:   Requested Prescriptions     Pending Prescriptions Disp Refills   • lisinopril (PRINIVIL,ZESTRIL) 2.5 MG tablet 30 tablet 5     Sig: Take 1 tablet by mouth Daily.   • metoprolol tartrate (LOPRESSOR) 25 MG tablet 180 tablet 1     Sig: Take 1 tablet by mouth Every 12 (Twelve) Hours.        Pharmacy where request should be sent: [facility]/PHARMACY #[id number] - [address] [phone] [facility] [phone] FX     Additional details provided by patient: PATIENT STATED THAT HE IS ALMOST OUT OF HIS MEDICATIONS AND THE ONLY SCRIPT THAT THE PHARMACY HAS RECEIVED IS HIS METFORMIN. PATIENT WOULD LIKE FOR ALL REMAINING SCRIPTS TO BE SENT OVER. PATIENT LAST SEEN ON 01/24/2022. PLEASE ADVISE.     Does the patient have less than a 3 day supply:  [x] Yes  [] No    Elva Montelongo Rep   01/26/22 09:32 CST

## 2022-01-28 ENCOUNTER — LAB (OUTPATIENT)
Dept: LAB | Facility: HOSPITAL | Age: 72
End: 2022-01-28

## 2022-01-28 DIAGNOSIS — R53.1 WEAKNESS: ICD-10-CM

## 2022-01-28 LAB — SARS-COV-2 ORF1AB RESP QL NAA+PROBE: DETECTED

## 2022-01-28 PROCEDURE — C9803 HOPD COVID-19 SPEC COLLECT: HCPCS

## 2022-01-28 PROCEDURE — U0004 COV-19 TEST NON-CDC HGH THRU: HCPCS

## 2022-02-23 ENCOUNTER — TELEPHONE (OUTPATIENT)
Dept: INTERNAL MEDICINE | Facility: CLINIC | Age: 72
End: 2022-02-23

## 2022-02-23 NOTE — TELEPHONE ENCOUNTER
Caller: PEGGY GUSTAFSON    Relationship: Emergency Contact    Best call back number: [phone]  What is the best time to reach you: ANY    Who are you requesting to speak with (clinical staff, provider,  specific staff member): CLINICAL    Do you know the name of the person who called:     What was the call regarding: PATIENTS FRIEND CALLED IN STATING THAT NEFTALY TESTED POSITIVE ON JAN. 26TH AND STATES THE APRN ( VENUS) SAID HE NEEDS TO BE RETESTED AND HAVE AN APPOINTMENT MADE AS WELL.     Do you require a callback:  YES

## 2022-02-24 ENCOUNTER — TELEPHONE (OUTPATIENT)
Dept: INTERNAL MEDICINE | Facility: CLINIC | Age: 72
End: 2022-02-24

## 2022-02-25 ENCOUNTER — OFFICE VISIT (OUTPATIENT)
Dept: INTERNAL MEDICINE | Facility: CLINIC | Age: 72
End: 2022-02-25

## 2022-02-25 ENCOUNTER — LAB (OUTPATIENT)
Dept: LAB | Facility: HOSPITAL | Age: 72
End: 2022-02-25

## 2022-02-25 VITALS
HEART RATE: 59 BPM | DIASTOLIC BLOOD PRESSURE: 70 MMHG | WEIGHT: 212.8 LBS | HEIGHT: 69 IN | RESPIRATION RATE: 16 BRPM | OXYGEN SATURATION: 98 % | BODY MASS INDEX: 31.52 KG/M2 | SYSTOLIC BLOOD PRESSURE: 134 MMHG | TEMPERATURE: 98.7 F

## 2022-02-25 DIAGNOSIS — D64.9 ANEMIA, UNSPECIFIED TYPE: ICD-10-CM

## 2022-02-25 DIAGNOSIS — Z86.16 HISTORY OF 2019 NOVEL CORONAVIRUS DISEASE (COVID-19): ICD-10-CM

## 2022-02-25 DIAGNOSIS — E78.2 MIXED HYPERLIPIDEMIA: ICD-10-CM

## 2022-02-25 DIAGNOSIS — R53.83 FATIGUE, UNSPECIFIED TYPE: ICD-10-CM

## 2022-02-25 DIAGNOSIS — E11.65 TYPE 2 DIABETES MELLITUS WITH HYPERGLYCEMIA, WITHOUT LONG-TERM CURRENT USE OF INSULIN: ICD-10-CM

## 2022-02-25 DIAGNOSIS — I50.32 CHRONIC DIASTOLIC CHF (CONGESTIVE HEART FAILURE): ICD-10-CM

## 2022-02-25 DIAGNOSIS — I25.118 CORONARY ARTERY DISEASE OF NATIVE ARTERY OF NATIVE HEART WITH STABLE ANGINA PECTORIS: Primary | ICD-10-CM

## 2022-02-25 LAB
ALBUMIN SERPL-MCNC: 4.7 G/DL (ref 3.5–5.2)
ALBUMIN/GLOB SERPL: 1.9 G/DL
ALP SERPL-CCNC: 39 U/L (ref 39–117)
ALT SERPL W P-5'-P-CCNC: 6 U/L (ref 1–41)
ANION GAP SERPL CALCULATED.3IONS-SCNC: 13.8 MMOL/L (ref 5–15)
AST SERPL-CCNC: 13 U/L (ref 1–40)
BILIRUB SERPL-MCNC: 0.2 MG/DL (ref 0–1.2)
BUN SERPL-MCNC: 23 MG/DL (ref 8–23)
BUN/CREAT SERPL: 19.5 (ref 7–25)
CALCIUM SPEC-SCNC: 9.7 MG/DL (ref 8.6–10.5)
CHLORIDE SERPL-SCNC: 102 MMOL/L (ref 98–107)
CHOLEST SERPL-MCNC: 113 MG/DL (ref 0–200)
CO2 SERPL-SCNC: 23.2 MMOL/L (ref 22–29)
CREAT SERPL-MCNC: 1.18 MG/DL (ref 0.76–1.27)
DEPRECATED RDW RBC AUTO: 49.5 FL (ref 37–54)
ERYTHROCYTE [DISTWIDTH] IN BLOOD BY AUTOMATED COUNT: 18.7 % (ref 12.3–15.4)
GFR SERPL CREATININE-BSD FRML MDRD: 61 ML/MIN/1.73
GLOBULIN UR ELPH-MCNC: 2.5 GM/DL
GLUCOSE SERPL-MCNC: 88 MG/DL (ref 65–99)
HBA1C MFR BLD: 5.6 % (ref 4.8–5.6)
HCT VFR BLD AUTO: 41 % (ref 37.5–51)
HDLC SERPL-MCNC: 64 MG/DL (ref 40–60)
HGB BLD-MCNC: 12.3 G/DL (ref 13–17.7)
LDLC SERPL CALC-MCNC: 35 MG/DL (ref 0–100)
LDLC/HDLC SERPL: 0.55 {RATIO}
MCH RBC QN AUTO: 23 PG (ref 26.6–33)
MCHC RBC AUTO-ENTMCNC: 30 G/DL (ref 31.5–35.7)
MCV RBC AUTO: 76.6 FL (ref 79–97)
PLATELET # BLD AUTO: 238 10*3/MM3 (ref 140–450)
PMV BLD AUTO: 9.3 FL (ref 6–12)
POTASSIUM SERPL-SCNC: 5.1 MMOL/L (ref 3.5–5.2)
PROT SERPL-MCNC: 7.2 G/DL (ref 6–8.5)
RBC # BLD AUTO: 5.35 10*6/MM3 (ref 4.14–5.8)
SODIUM SERPL-SCNC: 139 MMOL/L (ref 136–145)
TRIGL SERPL-MCNC: 69 MG/DL (ref 0–150)
TSH SERPL DL<=0.05 MIU/L-ACNC: 4.67 UIU/ML (ref 0.27–4.2)
VLDLC SERPL-MCNC: 14 MG/DL (ref 5–40)
WBC NRBC COR # BLD: 9.72 10*3/MM3 (ref 3.4–10.8)

## 2022-02-25 PROCEDURE — 36415 COLL VENOUS BLD VENIPUNCTURE: CPT

## 2022-02-25 PROCEDURE — 84466 ASSAY OF TRANSFERRIN: CPT

## 2022-02-25 PROCEDURE — 85027 COMPLETE CBC AUTOMATED: CPT

## 2022-02-25 PROCEDURE — 84443 ASSAY THYROID STIM HORMONE: CPT

## 2022-02-25 PROCEDURE — 83540 ASSAY OF IRON: CPT

## 2022-02-25 PROCEDURE — 84439 ASSAY OF FREE THYROXINE: CPT

## 2022-02-25 PROCEDURE — 82728 ASSAY OF FERRITIN: CPT

## 2022-02-25 PROCEDURE — 99214 OFFICE O/P EST MOD 30 MIN: CPT | Performed by: INTERNAL MEDICINE

## 2022-02-25 PROCEDURE — 80053 COMPREHEN METABOLIC PANEL: CPT

## 2022-02-25 PROCEDURE — 83036 HEMOGLOBIN GLYCOSYLATED A1C: CPT

## 2022-02-25 PROCEDURE — 80061 LIPID PANEL: CPT

## 2022-02-25 RX ORDER — UBIDECARENONE 100 MG
100 CAPSULE ORAL DAILY
COMMUNITY

## 2022-02-25 NOTE — PROGRESS NOTES
Chief Complaint   Patient presents with   • Office Visit   • Dizziness         History:  Aren Beltrán is a 71 y.o. male who presents today for evaluation of the above problems.      He presents today for an acute visit.  He was diagnosed with COVID-19 on January 28 2022.  His symptoms were not too severe, but since then he is having fatigue and other issues.  He feels tired all the time.  Just walking from the parking lot to our office he gets exhausted and has to sit Lola.  With exertion he is also getting chest pain with mid sternum.  If it severe he takes a nitroglycerin.  Symptoms are not as severe as when he had his ST elevation MI last year.  Feels like it is pain rather than blood pressure.  Is been over a year since his ST elevation I, and he has stopped his Brilinta.  He denies any lower extremity edema or shortness of breath.  He does not have any chest pain today.    His home was destroyed in the [facility] and is currently living in a hotel.  His blood sugar has been uncontrolled as he cannot control what he eats as well.    HPI      ROS:  Review of Systems   Constitutional: Positive for activity change and fatigue. Negative for appetite change.   Respiratory: Positive for shortness of breath.    Cardiovascular: Positive for chest pain.   Genitourinary: Positive for frequency.         Current Outpatient Medications:   •  Ascorbic Acid (VITAMIN C ADULT GUMMIES PO), Take 1 tablet by mouth Daily., Disp: , Rfl:   •  aspirin 81 MG EC tablet, Take 1 tablet by mouth Daily., Disp: 30 tablet, Rfl: 11  •  atorvastatin (LIPITOR) 20 MG tablet, Take 1 tablet by mouth every night at bedtime., Disp: 90 tablet, Rfl: 1  •  coenzyme Q10 100 MG capsule, Take 100 mg by mouth Daily., Disp: , Rfl:   •  Cyanocobalamin (VITAMIN B12 PO), Take 1 tablet by mouth Daily., Disp: , Rfl:   •  glucose blood (Contour Next Test) test strip, Use as instructed, Disp: 200 each, Rfl: 0  •  insulin detemir (Levemir) 100 UNIT/ML  injection, Inject 20 Units under the skin into the appropriate area as directed Daily., Disp: , Rfl:   •  insulin lispro (HumaLOG) 100 UNIT/ML injection, Inject 5 Units under the skin into the appropriate area as directed 3 (Three) Times a Day Before Meals., Disp: 45 mL, Rfl: 5  •  lisinopril (PRINIVIL,ZESTRIL) 2.5 MG tablet, Take 1 tablet by mouth Daily., Disp: 30 tablet, Rfl: 5  •  metFORMIN (GLUCOPHAGE) 500 MG tablet, TAKE 1 TABLET BY MOUTH TWICE A DAY WITH MEALS, Disp: 180 tablet, Rfl: 1  •  metoprolol tartrate (LOPRESSOR) 25 MG tablet, Take 1 tablet by mouth Every 12 (Twelve) Hours., Disp: 180 tablet, Rfl: 1  •  Microlet Lancets misc, USE TO CHECK BLOOD SUGAR 4 TIMES A DAY BEFORE MEALS AND AT BEDTIME *INS ONLY PAY FOR 30 DAY SUPPLY*, Disp: 200 each, Rfl: 11  •  nitroglycerin (NITROSTAT) 0.4 MG SL tablet, Place 1 tablet under the tongue Every 5 (Five) Minutes As Needed for Chest Pain. Take no more than 3 doses in 15 minutes., Disp: 25 tablet, Rfl: 12  •  ticagrelor (BRILINTA) 90 MG tablet tablet, Take 1 tablet by mouth Every 12 (Twelve) Hours., Disp: 60 tablet, Rfl: 11    Lab Results   Component Value Date    GLUCOSE 262 (H) 01/03/2021    BUN 22 01/03/2021    CREATININE 0.94 01/03/2021    EGFRIFNONA 80 01/03/2021    EGFRIFAFRI 78 01/01/2021    BCR 23.4 01/03/2021    K 3.9 01/03/2021    CO2 26.0 01/03/2021    CALCIUM 9.2 01/03/2021    ALBUMIN 3.90 01/02/2021    AST 81 (H) 01/02/2021    ALT 13 01/02/2021       WBC   Date Value Ref Range Status   01/03/2021 9.26 3.40 - 10.80 10*3/mm3 Final     RBC   Date Value Ref Range Status   01/03/2021 5.19 4.14 - 5.80 10*6/mm3 Final     Hemoglobin   Date Value Ref Range Status   01/03/2021 14.5 13.0 - 17.7 g/dL Final     Hematocrit   Date Value Ref Range Status   01/03/2021 43.1 37.5 - 51.0 % Final     MCV   Date Value Ref Range Status   01/03/2021 83.0 79.0 - 97.0 fL Final     MCH   Date Value Ref Range Status   01/03/2021 27.9 26.6 - 33.0 pg Final     MCHC   Date Value Ref  "Range Status   01/03/2021 33.6 31.5 - 35.7 g/dL Final     RDW   Date Value Ref Range Status   01/03/2021 13.2 12.3 - 15.4 % Final     RDW-SD   Date Value Ref Range Status   01/03/2021 39.4 37.0 - 54.0 fl Final     MPV   Date Value Ref Range Status   01/03/2021 10.0 6.0 - 12.0 fL Final     Platelets   Date Value Ref Range Status   01/03/2021 224 140 - 450 10*3/mm3 Final     Neutrophil %   Date Value Ref Range Status   01/01/2021 69.1 42.7 - 76.0 % Final     Lymphocyte %   Date Value Ref Range Status   01/01/2021 19.2 (L) 19.6 - 45.3 % Final     Monocyte %   Date Value Ref Range Status   01/01/2021 7.7 5.0 - 12.0 % Final     Eosinophil %   Date Value Ref Range Status   01/01/2021 1.9 0.3 - 6.2 % Final     Basophil %   Date Value Ref Range Status   01/01/2021 1.1 0.0 - 1.5 % Final     Immature Grans %   Date Value Ref Range Status   01/01/2021 1.0 (H) 0.0 - 0.5 % Final     Neutrophils, Absolute   Date Value Ref Range Status   01/01/2021 6.04 1.70 - 7.00 10*3/mm3 Final     Lymphocytes, Absolute   Date Value Ref Range Status   01/01/2021 1.68 0.70 - 3.10 10*3/mm3 Final     Monocytes, Absolute   Date Value Ref Range Status   01/01/2021 0.67 0.10 - 0.90 10*3/mm3 Final     Eosinophils, Absolute   Date Value Ref Range Status   01/01/2021 0.17 0.00 - 0.40 10*3/mm3 Final     Basophils, Absolute   Date Value Ref Range Status   01/01/2021 0.10 0.00 - 0.20 10*3/mm3 Final     Immature Grans, Absolute   Date Value Ref Range Status   01/01/2021 0.09 (H) 0.00 - 0.05 10*3/mm3 Final     nRBC   Date Value Ref Range Status   01/01/2021 0.0 0.0 - 0.2 /100 WBC Final         OBJECTIVE:  Visit Vitals  /70 (BP Location: Left arm, Patient Position: Sitting, Cuff Size: Adult)   Pulse 59   Temp 98.7 °F (37.1 °C) (Oral)   Resp 16   Ht 175.3 cm (69.02\")   Wt 96.5 kg (212 lb 12.8 oz)   SpO2 98%   BMI 31.41 kg/m²      Physical Exam  Vitals and nursing note reviewed.   Constitutional:       General: He is not in acute distress.     Appearance: " Normal appearance. He is well-developed. He is not ill-appearing, toxic-appearing or diaphoretic.   HENT:      Head: Normocephalic and atraumatic.   Eyes:      Pupils: Pupils are equal, round, and reactive to light.   Neck:      Thyroid: No thyromegaly.      Trachea: Phonation normal.   Cardiovascular:      Rate and Rhythm: Normal rate and regular rhythm.      Heart sounds: Normal heart sounds. No murmur heard.  No friction rub.   Pulmonary:      Effort: Pulmonary effort is normal. No respiratory distress.      Breath sounds: Normal breath sounds. No stridor. No wheezing, rhonchi or rales.   Musculoskeletal:      Right lower leg: No edema.      Left lower leg: No edema.   Skin:     General: Skin is warm and dry.      Coloration: Skin is not jaundiced or pale.      Findings: No erythema or rash.   Neurological:      Mental Status: He is alert.   Psychiatric:         Behavior: Behavior normal.         Thought Content: Thought content normal.         Judgment: Judgment normal.         Assessment/Plan    Diagnoses and all orders for this visit:    1. Coronary artery disease of native artery of native heart with stable angina pectoris (HCC) (Primary)  -     Adult Transthoracic Echo Complete W/ Cont if Necessary Per Protocol; Future    2. Type 2 diabetes mellitus with hyperglycemia, without long-term current use of insulin (HCC)  -     CBC (No Diff); Future  -     Comprehensive Metabolic Panel; Future  -     Hemoglobin A1c; Future    3. Mixed hyperlipidemia  -     Lipid Panel; Future    4. Chronic diastolic CHF (congestive heart failure) (HCC)  -     Adult Transthoracic Echo Complete W/ Cont if Necessary Per Protocol; Future    5. History of 2019 novel coronavirus disease (COVID-19)  -     Adult Transthoracic Echo Complete W/ Cont if Necessary Per Protocol; Future    6. Fatigue, unspecified type  -     CBC (No Diff); Future  -     TSH; Future  -     Adult Transthoracic Echo Complete W/ Cont if Necessary Per Protocol;  Future      I would like to obtain further work-up for his symptoms.  I would like to get the above blood work today, but also order a 2D echocardiogram.  I am concerned about his exertional chest pain, and fatigue with exertion that developed after COVID-19 infection.  He may need another ischemic work-up, but I will defer to his cardiologist.  I have asked Aren to contact his cardiologist to notify them he has been having the symptoms.    Further work-up or management based on his symptoms and the work-up above.    We will cancel his appointment on March 2 and reschedule for 6 months from now.  Sooner if indicated clinically.    Return in about 6 months (around 8/25/2022) for Recheck, cancel 3/2/22.      SUNITA Puentes MD  10:00 CST  2/25/2022

## 2022-02-28 DIAGNOSIS — R53.83 FATIGUE, UNSPECIFIED TYPE: Primary | ICD-10-CM

## 2022-02-28 DIAGNOSIS — D64.9 ANEMIA, UNSPECIFIED TYPE: Primary | ICD-10-CM

## 2022-02-28 DIAGNOSIS — D50.9 IRON DEFICIENCY ANEMIA, UNSPECIFIED IRON DEFICIENCY ANEMIA TYPE: Primary | ICD-10-CM

## 2022-02-28 LAB
FERRITIN SERPL-MCNC: 20.9 NG/ML (ref 30–400)
IRON 24H UR-MRATE: 36 MCG/DL (ref 59–158)
IRON SATN MFR SERPL: 7 % (ref 20–50)
T4 FREE SERPL-MCNC: 1.13 NG/DL (ref 0.93–1.7)
TIBC SERPL-MCNC: 550 MCG/DL (ref 298–536)
TRANSFERRIN SERPL-MCNC: 369 MG/DL (ref 200–360)

## 2022-03-02 ENCOUNTER — TELEPHONE (OUTPATIENT)
Dept: INTERNAL MEDICINE | Facility: CLINIC | Age: 72
End: 2022-03-02

## 2022-03-02 NOTE — TELEPHONE ENCOUNTER
Patient called and stated that he was supposed to be having a colonoscopy done, [employer] reached out to him and they stated 4 providers and none of them were Dr. Puentes. May need order to be resent.    Patient needs to be advised of what to do.

## 2022-03-08 NOTE — PROGRESS NOTES
"Chief Complaint:   Chief Complaint   Patient presents with   • Anemia     Pt presents today for evaluation for iron deficiency anemia-had labs for PCP recently that showed this and was started on OTC iron; Pt's last colon was 4-5 years ago [facility]-states he had \"tons\" of polyps         Patient ID: Aren Beltrán is a 71 y.o. male     History of Present Illness: This is a very pleasant 71-year-old male who was referred for iron deficiency anemia.    Labs on 2/25/2022 CMP unremarkable. Hemoglobin A1c 5.6. TSH elevated at 4.6. Iron/anemia profile with an iron of 36, ferritin 20, iron saturation 7, total iron binding capacity 550. CBC revealed a hemoglobin of 12.  The patient was started on iron replacement.  The patient does carry a history of CAD/MI and CHF.  He is seen by Dr. Fitch with cardiology.  He underwent heart cath 1/1/2021 and placed on Brilinta but the patient tells me this was stopped January 2022..  The patient states his [facility] 4 to [facility] years ago and \"I had tons of polyps.\"  There is no known family history of colon cancer or colon polyps that the patient is aware of.  The patient tells me that he does have a history of heartburn and reflux \"but as long as I take my over-the-counter medicine which I do not know the name of but it is a 24-hour pill it works however I do not take it every day like I should.\"  He denies any chronic NSAID use.  The patient ells me he does feel a lot of fatigue.  The patient tells me that he was diagnosed with COVID 1/28/2022.  The patient tells me that he has been having chest discomfort since having Covid.  He tells me \"last night I had such bad chest pain that I took 2 aspirins and also took nitro.\"  The patient tells me that \"other people have been telling me that I am pale looking over this past month just like you did.\"    Past Medical History:   Diagnosis Date   • History of colon polyps    • Hypertriglyceridemia 01/02/2021   • Type " 2 diabetes mellitus (HCC)        Past Surgical History:   Procedure Laterality Date   • CARDIAC CATHETERIZATION N/A 1/1/2021    Procedure: Left Heart Cath;  Surgeon: Marko Fitch MD;  Location:  [facility];  Service: Cardiovascular;  Laterality: N/A;   • CARDIAC CATHETERIZATION N/A 1/1/2021    Procedure: Percutaneous Coronary Intervention;  Surgeon: Marko Fitch MD;  Location:  [facility];  Service: Cardiovascular;  Laterality: N/A;         Current Outpatient Medications:   •  Ascorbic Acid (VITAMIN C ADULT GUMMIES PO), Take 1 tablet by mouth Daily., Disp: , Rfl:   •  aspirin 81 MG EC tablet, Take 1 tablet by mouth Daily., Disp: 30 tablet, Rfl: 11  •  atorvastatin (LIPITOR) 20 MG tablet, Take 1 tablet by mouth every night at bedtime., Disp: 90 tablet, Rfl: 1  •  coenzyme Q10 100 MG capsule, Take 100 mg by mouth Daily., Disp: , Rfl:   •  Cyanocobalamin (VITAMIN B12 PO), Take 1 tablet by mouth Daily., Disp: , Rfl:   •  glucose blood (Contour Next Test) test strip, Use as instructed, Disp: 200 each, Rfl: 0  •  insulin detemir (Levemir) 100 UNIT/ML injection, Inject 20 Units under the skin into the appropriate area as directed Daily., Disp: , Rfl:   •  insulin lispro (HumaLOG) 100 UNIT/ML injection, Inject 5 Units under the skin into the appropriate area as directed 3 (Three) Times a Day Before Meals., Disp: 45 mL, Rfl: 5  •  lisinopril (PRINIVIL,ZESTRIL) 2.5 MG tablet, Take 1 tablet by mouth Daily., Disp: 30 tablet, Rfl: 5  •  metFORMIN (GLUCOPHAGE) 500 MG tablet, TAKE 1 TABLET BY MOUTH TWICE A DAY WITH MEALS, Disp: 180 tablet, Rfl: 1  •  metoprolol tartrate (LOPRESSOR) 25 MG tablet, Take 1 tablet by mouth Every 12 (Twelve) Hours., Disp: 180 tablet, Rfl: 1  •  Microlet Lancets misc, USE TO CHECK BLOOD SUGAR 4 TIMES A DAY BEFORE MEALS AND AT BEDTIME *INS ONLY PAY FOR 30 DAY SUPPLY*, Disp: 200 each, Rfl: 11  •  nitroglycerin (NITROSTAT) 0.4 MG SL tablet, Place 1  "tablet under the tongue Every 5 (Five) Minutes As Needed for Chest Pain. Take no more than 3 doses in 15 minutes., Disp: 25 tablet, Rfl: 12  •  Sodium Sulfate-Mag Sulfate-KCl 7029-672-981 MG tablet, Take 12 tablets by mouth Take As Directed., Disp: 24 tablet, Rfl: 0    Allergies   Allergen Reactions   • Ozempic (0.25 Or 0.5 Mg-Dose) [Semaglutide(0.25 Or 0.5mg-Dos)] Diarrhea       Social History     Socioeconomic History   • Marital status: Significant Other   Tobacco Use   • Smoking status: Former Smoker   • Smokeless tobacco: Never Used   Vaping Use   • Vaping Use: Never used   Substance and Sexual Activity   • Alcohol use: Not Currently   • Drug use: Never   • Sexual activity: Defer       Family History   Problem Relation Age of Onset   • Coronary artery disease Mother    • Coronary artery disease Father    • Heart disease Brother    • Heart disease Brother    • Colon cancer Neg Hx    • Colon polyps Neg Hx    • Esophageal cancer Neg Hx    • Liver cancer Neg Hx    • Liver disease Neg Hx    • Rectal cancer Neg Hx    • Stomach cancer Neg Hx        Vitals:    03/09/22 0851   BP: 132/82   BP Location: Left arm   Patient Position: Sitting   Cuff Size: Adult   Pulse: 59   Temp: 97.6 °F (36.4 °C)   TempSrc: Infrared   SpO2: 99%   Weight: 93.9 kg (207 lb)   Height: 175.3 cm (69\")       Review of Systems:    General:    Present -feeling well   Skin:    Not Present-Rash   HEENT:     Not Present-Acute visual changes or Acute hearing changes   Neck :    Not Present- swollen glands   Genitourinary:      Not Present- burning, frequency, urgency hematuria, dysuria,   Cardiovascular:   Not Present-chest pain, palpitations, or pressure   Respiratory:   Not Present- shortness of breath or cough   Gastrointestinal:  Musculoskeletal:  Neurological:  Psychiatric:   Present as mentioned in the HP    Not Present. Recent gait disturbances.    Not Present-Seizures and weakness in extremities.    Not Present- Anxiety or Depression.     "   Physical Exam:    General Appearance:    Alert, cooperative, in no acute distress, very pale   Psych:    Mood appropriate    Eyes:          conjunctivae and sclerae normal, no   icterus, no pallor   ENMT:    Ears appear intact with no abnormalities noted oral mucosa moist   Neck:   No adenopathy, supple, trachea midline, no thyromegaly, no   carotid bruit, no JVD    Cardiovascular:    Regular rhythm and normal rate, normal S1 and S2, no            murmur, no gallop, no rub, no click   Gastrointestinal:     Inspection normal.  Normal bowel sounds, no masses, no organomegaly, soft round non-tender, non-distended, no guarding, no rebound or tenderness. No hepatosplenomegaly.   Skin:   No bleeding, bruising or rash   Neurologic:   nonfocal       Lab Results - Last 18 Months   Lab Units 02/25/22  1057 01/03/21  0424 01/02/21  0307 01/01/21  1606   GLUCOSE mg/dL 88 262* 296* 562*   BUN mg/dL 23 22 19 18   CREATININE mg/dL 1.18 0.94 0.89 1.13   SODIUM mmol/L 139 137 136 129*   POTASSIUM mmol/L 5.1 3.9 4.1 4.4   CHLORIDE mmol/L 102 101 99 95*   CO2 mmol/L 23.2 26.0 26.0 20.0*   TOTAL PROTEIN g/dL 7.2  --  6.9 7.4   ALBUMIN g/dL 4.70  --  3.90 4.20   ALT (SGPT) U/L 6  --  13 7   AST (SGOT) U/L 13  --  81* 16   ALK PHOS U/L 39  --  44 56   BILIRUBIN mg/dL 0.2  --  0.2 0.4   GLOBULIN gm/dL 2.5  --  3.0 3.2       Lab Results - Last 18 Months   Lab Units 02/25/22  1057 01/03/21  0424 01/02/21  0307 01/01/21  1606   HEMOGLOBIN g/dL 12.3* 14.5 15.1 16.1   HEMATOCRIT % 41.0 43.1 42.3 44.0   MCV fL 76.6* 83.0 81.0 78.6*   WBC 10*3/mm3 9.72 9.26 12.13* 8.75   RDW % 18.7* 13.2 12.9 12.6   MPV fL 9.3 10.0 9.8 9.6   PLATELETS 10*3/mm3 238 224 243 241   INR   --   --   --  0.90*       Lab Results - Last 18 Months   Lab Units 02/25/22  1057 01/02/21  0307   IRON mcg/dL 36*  --    TIBC mcg/dL 550*  --    IRON SATURATION % 7*  --    FERRITIN ng/mL 20.90*  --    TSH uIU/mL 4.670* 1.480        Lab Results - Last 18 Months   Lab Units  02/25/22  1057   FERRITIN ng/mL 20.90*           Assessment and Plan:  Assessment/Plan   Diagnoses and all orders for this visit:    1. Anemia, unspecified type (Primary)  -     Case Request; Standing  -     Case Request    2. Hx of colonic polyps  -     Case Request; Standing  -     Case Request    3. Gastroesophageal reflux disease, unspecified whether esophagitis present  -     Case Request; Standing  -     Case Request    4. Heartburn  -     Case Request; Standing  -     Case Request    5. Malaise and fatigue  -     Case Request; Standing  -     Case Request    6. Paleness  -     Case Request; Standing  -     Case Request    7. Other iron deficiency anemia  -     Case Request; Standing  -     Case Request    8. Coronary artery disease due to calcified coronary lesion    9. Chest pain, unspecified type    10. History of COVID-19    Other orders  -     Follow Anesthesia Guidelines / Protocol; Future  -     Obtain Informed Consent; Future  -     Sodium Sulfate-Mag Sulfate-KCl 4006-501-317 MG tablet; Take 12 tablets by mouth Take As Directed.  Dispense: 24 tablet; Refill: 0    The patient and I have had a lengthy discussion.  Due to his history of MI/CAD with stent placement in 2021 and complaints today of angina last night along with fatigue and paleness I will be referring him back to see Dr. Fitch before we will proceed to EGD and colonoscopy.  He is iron deficient however his hemoglobin is 12.  As noted above he recently had COVID as well in January.  I have arranged referral for Friday, 3/11/2022 at 8:15 with cardiology and the patient agrees to follow-up with this appointment.       There are no Patient Instructions on file for this visit.    Next follow-up appointment    The risks, benefits, and alternatives of endoscopy were reviewed with the patient today.  Risks including perforation, with or without dilation, possibly requiring surgery.  Additional risks include risk of bleeding.  There is also the  risk of a drug reaction or problems with anesthesia.  This will be discussed with the further by the anesthesia team on the day of the procedure. The benefits include the diagnosis and management of disease of the upper digestive tract.  Alternatives to endoscopy include upper GI series, laboratory testing, radiographic evaluation, or no intervention.  The patient verbalizes understanding and agrees.    The risks, benefits, and alternatives of colonoscopy were reviewed with the patient today.  Risks including perforation of the colon possibly requiring surgery or colostomy.  Additional risks include risk of bleeding from biopsies or removal of colon tissue.  There is also the risk of a drug reaction or problems with anesthesia.  This will be discussed with the further by the anesthesia team on the day of the procedure.  Lastly there is a possibility of missing a colon polyp or cancer.  The benefits include the diagnosis and management of disease of the colon and rectum.  Alternatives to colonoscopy include barium enema, laboratory testing, radiographic evaluation, or no intervention.  The patient verbalizes understanding and agrees.      EMR Dragon/Transcription disclaimer:  Much of this encounter note is an electronic transcription/translation of spoken language to printed text. The electronic translation of spoken language may permit erroneous, or at times, nonsensical words or phrases to be inadvertently transcribed; although I have reviewed the note for such errors, some may still exist.

## 2022-03-09 ENCOUNTER — OFFICE VISIT (OUTPATIENT)
Dept: GASTROENTEROLOGY | Facility: CLINIC | Age: 72
End: 2022-03-09

## 2022-03-09 ENCOUNTER — TELEPHONE (OUTPATIENT)
Dept: GASTROENTEROLOGY | Facility: CLINIC | Age: 72
End: 2022-03-09

## 2022-03-09 VITALS
HEART RATE: 59 BPM | BODY MASS INDEX: 30.66 KG/M2 | WEIGHT: 207 LBS | SYSTOLIC BLOOD PRESSURE: 132 MMHG | DIASTOLIC BLOOD PRESSURE: 82 MMHG | HEIGHT: 69 IN | TEMPERATURE: 97.6 F | OXYGEN SATURATION: 99 %

## 2022-03-09 DIAGNOSIS — R07.9 CHEST PAIN, UNSPECIFIED TYPE: ICD-10-CM

## 2022-03-09 DIAGNOSIS — I25.10 CORONARY ARTERY DISEASE DUE TO CALCIFIED CORONARY LESION: ICD-10-CM

## 2022-03-09 DIAGNOSIS — R12 HEARTBURN: ICD-10-CM

## 2022-03-09 DIAGNOSIS — I25.84 CORONARY ARTERY DISEASE DUE TO CALCIFIED CORONARY LESION: ICD-10-CM

## 2022-03-09 DIAGNOSIS — R23.1 PALENESS: ICD-10-CM

## 2022-03-09 DIAGNOSIS — D64.9 ANEMIA, UNSPECIFIED TYPE: Primary | ICD-10-CM

## 2022-03-09 DIAGNOSIS — R53.83 MALAISE AND FATIGUE: ICD-10-CM

## 2022-03-09 DIAGNOSIS — Z86.16 HISTORY OF COVID-19: ICD-10-CM

## 2022-03-09 DIAGNOSIS — R53.81 MALAISE AND FATIGUE: ICD-10-CM

## 2022-03-09 DIAGNOSIS — Z86.010 HX OF COLONIC POLYPS: ICD-10-CM

## 2022-03-09 DIAGNOSIS — D50.8 OTHER IRON DEFICIENCY ANEMIA: ICD-10-CM

## 2022-03-09 DIAGNOSIS — K21.9 GASTROESOPHAGEAL REFLUX DISEASE, UNSPECIFIED WHETHER ESOPHAGITIS PRESENT: ICD-10-CM

## 2022-03-09 PROBLEM — Z86.0100 HX OF COLONIC POLYPS: Status: ACTIVE | Noted: 2022-03-09

## 2022-03-09 PROCEDURE — 99214 OFFICE O/P EST MOD 30 MIN: CPT | Performed by: NURSE PRACTITIONER

## 2022-03-11 ENCOUNTER — OFFICE VISIT (OUTPATIENT)
Dept: CARDIOLOGY | Facility: CLINIC | Age: 72
End: 2022-03-11

## 2022-03-11 VITALS
SYSTOLIC BLOOD PRESSURE: 150 MMHG | HEIGHT: 69 IN | BODY MASS INDEX: 30.66 KG/M2 | OXYGEN SATURATION: 100 % | WEIGHT: 207 LBS | HEART RATE: 51 BPM | DIASTOLIC BLOOD PRESSURE: 80 MMHG

## 2022-03-11 DIAGNOSIS — R53.83 FATIGUE, UNSPECIFIED TYPE: ICD-10-CM

## 2022-03-11 DIAGNOSIS — I25.119 CORONARY ARTERY DISEASE INVOLVING NATIVE CORONARY ARTERY OF NATIVE HEART WITH ANGINA PECTORIS: Primary | ICD-10-CM

## 2022-03-11 DIAGNOSIS — R06.09 DYSPNEA ON EXERTION: ICD-10-CM

## 2022-03-11 PROCEDURE — 93000 ELECTROCARDIOGRAM COMPLETE: CPT | Performed by: NURSE PRACTITIONER

## 2022-03-11 PROCEDURE — 99214 OFFICE O/P EST MOD 30 MIN: CPT | Performed by: NURSE PRACTITIONER

## 2022-03-11 RX ORDER — NITROGLYCERIN 0.4 MG/1
0.4 TABLET SUBLINGUAL
Qty: 25 TABLET | Refills: 2 | Status: SHIPPED | OUTPATIENT
Start: 2022-03-11

## 2022-03-11 RX ORDER — RANOLAZINE 500 MG/1
500 TABLET, EXTENDED RELEASE ORAL 2 TIMES DAILY
Qty: 60 TABLET | Refills: 5 | Status: SHIPPED | OUTPATIENT
Start: 2022-03-11 | End: 2022-06-08

## 2022-03-11 NOTE — PROGRESS NOTES
Subjective:     Encounter Date:03/11/22      Patient ID: Aren Beltrán is a 71 y.o. male known coronary artery disease, inferior ST elevation myocardial infarction Jan 2021, type II diabetes mellitus with insulin use, hyperlipidemia, and obesity who presents the office today for     Chief Complaint: Chest tightness, fatigue  Coronary Artery Disease  Presents for follow-up visit. Symptoms include chest pain, dizziness and shortness of breath. Pertinent negatives include no chest pressure, chest tightness, leg swelling, palpitations or weight gain. The symptoms have been worsening. Compliance with diet is good. Compliance with exercise is good. Compliance with medications is good.   Chest Pain   This is a new problem. The current episode started 1 to 4 weeks ago. The onset quality is sudden. The problem occurs every several days. The problem has been waxing and waning. The pain is present in the lateral region. The pain is mild. The quality of the pain is described as tightness. The pain radiates to the upper back. Associated symptoms include back pain, dizziness, malaise/fatigue, nausea, shortness of breath and weakness. Pertinent negatives include no abdominal pain, diaphoresis, headaches, irregular heartbeat, lower extremity edema, orthopnea, palpitations, PND, syncope or vomiting. The pain is aggravated by exertion and walking. He has tried nitroglycerin and rest for the symptoms. The treatment provided moderate relief. Risk factors include male gender.   His past medical history is significant for CAD and diabetes.     Mr. Beltrán presents today for complaints of chest tightness.  He was last seen in our office in December at which time follow-up.  He was previously placed on nitrate therapy for management of symptoms of chest pain.  He was unable to tolerate this long-term due to weakness and low blood pressure.  But since being off of the medication he has been doing well.  He denied any complaints of  discomfort at that time.      He contracted Covid in January had mild symptoms with cough and headache but ultimately has not felt well since that time.  He saw his primary care physician in February.  He was complaining of fatigue and exertional chest pain at that time.  His PCP recommended follow-up with cardiology and ordered echocardiogram and labs.  Results of his blood work noted iron deficiency anemia.  He recommended a colonoscopy and placed a referral for gastroenterology to evaluate him.  He was started on iron supplement as well.    He continued to complain of his exertional chest discomfort, fatigue when he saw gastroenterology earlier this week.  Due to his iron deficiency there was discussion regarding colonoscopy however no orders were placed as the patient is still having symptoms concerning for angina.  It was recommended that the patient follow-up with cardiology.      He presents today for follow-up to discuss his symptoms that he has been having for the past month or so.  He reports increased dizziness, exertional chest discomfort as well as rest pain, associated shortness of breath with back and neck pain as well.  He has taken nitroglycerin on a few occasions and has reported improvement with his symptoms.  Again, the patient previously had been on isosorbide however did not tolerate this due to low blood pressure and increased dizziness.  He has been off of it for several months however has not had recurrence of chest symptoms till recently.  He reports his last use of sublingual nitroglycerin was approximately 3 days ago.    He reports that his symptoms are somewhat similar to his presentation at the time of his acute infarction however those were much more severe.  He has left-sided chest tightness which radiates across the chest.  He reports radiation up into his back and neck.  He reports associated shortness of breath.  He does not have symptoms with exertion always but complains of pain  with exertion as well as rest pain, intermittently.    He completed his year-long dual antiplatelet therapy.  He has been on aspirin most recently.    The following portions of the patient's history were reviewed and updated as appropriate: allergies, current medications, past family history, past medical history, past social history and past surgical history.     Allergies   Allergen Reactions   • Ozempic (0.25 Or 0.5 Mg-Dose) [Semaglutide(0.25 Or 0.5mg-Dos)] Diarrhea          Current Outpatient Medications:   •  Ascorbic Acid (VITAMIN C ADULT GUMMIES PO), Take 1 tablet by mouth Daily., Disp: , Rfl:   •  aspirin 81 MG EC tablet, Take 1 tablet by mouth Daily., Disp: 30 tablet, Rfl: 11  •  atorvastatin (LIPITOR) 20 MG tablet, Take 1 tablet by mouth every night at bedtime., Disp: 90 tablet, Rfl: 1  •  coenzyme Q10 100 MG capsule, Take 100 mg by mouth Daily., Disp: , Rfl:   •  Cyanocobalamin (VITAMIN B12 PO), Take 1 tablet by mouth Daily., Disp: , Rfl:   •  Ferrous Sulfate (IRON PO), Take  by mouth Daily., Disp: , Rfl:   •  glucose blood (Contour Next Test) test strip, Use as instructed, Disp: 200 each, Rfl: 0  •  insulin detemir (Levemir) 100 UNIT/ML injection, Inject 20 Units under the skin into the appropriate area as directed Daily., Disp: , Rfl:   •  insulin lispro (HumaLOG) 100 UNIT/ML injection, Inject 5 Units under the skin into the appropriate area as directed 3 (Three) Times a Day Before Meals., Disp: 45 mL, Rfl: 5  •  lisinopril (PRINIVIL,ZESTRIL) 2.5 MG tablet, Take 1 tablet by mouth Daily., Disp: 30 tablet, Rfl: 5  •  metFORMIN (GLUCOPHAGE) 500 MG tablet, TAKE 1 TABLET BY MOUTH TWICE A DAY WITH MEALS, Disp: 180 tablet, Rfl: 1  •  metoprolol tartrate (LOPRESSOR) 25 MG tablet, Take 1 tablet by mouth Every 12 (Twelve) Hours., Disp: 180 tablet, Rfl: 1  •  Microlet Lancets misc, USE TO CHECK BLOOD SUGAR 4 TIMES A DAY BEFORE MEALS AND AT BEDTIME *INS ONLY PAY FOR 30 DAY SUPPLY*, Disp: 200 each, Rfl: 11  •   "nitroglycerin (NITROSTAT) 0.4 MG SL tablet, Place 1 tablet under the tongue Every 5 (Five) Minutes As Needed for Chest Pain. Take no more than 3 doses in 15 minutes., Disp: 25 tablet, Rfl: 2  •  Sodium Sulfate-Mag Sulfate-KCl 1314-447-103 MG tablet, Take 12 tablets by mouth Take As Directed., Disp: 24 tablet, Rfl: 0  •  ranolazine (Ranexa) 500 MG 12 hr tablet, Take 1 tablet by mouth 2 (Two) Times a Day., Disp: 60 tablet, Rfl: 5     Review of Systems   Constitutional: Positive for malaise/fatigue. Negative for diaphoresis, weight gain and weight loss.   Cardiovascular: Positive for chest pain. Negative for dyspnea on exertion, irregular heartbeat, leg swelling, orthopnea, palpitations, paroxysmal nocturnal dyspnea and syncope.   Respiratory: Positive for shortness of breath. Negative for chest tightness and wheezing.    Hematologic/Lymphatic: Negative for bleeding problem. Does not bruise/bleed easily.   Musculoskeletal: Positive for back pain. Negative for falls.   Gastrointestinal: Positive for nausea. Negative for bloating, abdominal pain, hematochezia, melena and vomiting.   Neurological: Positive for dizziness and weakness. Negative for headaches and light-headedness.   Psychiatric/Behavioral: Negative for altered mental status.       ECG 12 Lead    Date/Time: 3/11/2022 2:36 PM  Performed by: Nany Murray APRN  Authorized by: Nany Murray APRN   Comparison: compared with previous ECG from 12/8/2021  Similar to previous ECG  Rhythm: sinus bradycardia  Rate: bradycardic  BPM: 51  Conduction: 1st degree AV block  Q waves: III and aVF    QRS axis: normal    Clinical impression: abnormal EKG            /80   Pulse 51   Ht 175.3 cm (69\")   Wt 93.9 kg (207 lb)   SpO2 100%   BMI 30.57 kg/m²        Objective:     Vitals reviewed.   Constitutional:       General: Not in acute distress.     Appearance: Well-developed, well-groomed and not in distress. Not diaphoretic.   Eyes:      General:         Right " eye: No discharge.         Left eye: No discharge.   HENT:      Head: Normocephalic and atraumatic.    Mouth/Throat:      Pharynx: No oropharyngeal exudate.   Pulmonary:      Effort: Pulmonary effort is normal. No respiratory distress.      Breath sounds: Normal breath sounds. No wheezing. No rhonchi. No rales.   Chest:      Chest wall: Tender to palpatation (left chest tendeness under left breast area).   Cardiovascular:      Bradycardia present. Regular rhythm.      Murmurs: There is no murmur.      No gallop. No rub.   Edema:     Peripheral edema absent.   Abdominal:      General: There is no distension.      Palpations: Abdomen is soft.      Tenderness: There is no abdominal tenderness.   Musculoskeletal: Normal range of motion.      Cervical back: Normal range of motion and neck supple. Skin:     General: Skin is warm and dry.      Findings: No erythema.   Neurological:      Mental Status: Alert, oriented to person, place, and time and oriented to person, place and time.   Psychiatric:         Attention and Perception: Attention normal.         Mood and Affect: Mood normal.         Speech: Speech normal.         Behavior: Behavior normal. Behavior is cooperative.         Thought Content: Thought content normal.         Cognition and Memory: Cognition normal.         Judgment: Judgment normal.     Lab Review:   Lab Results   Component Value Date    CHOL 113 02/25/2022    TRIG 69 02/25/2022    HDL 64 (H) 02/25/2022    LDL 35 02/25/2022         Results for orders placed during the hospital encounter of 01/01/21    Adult Transthoracic Echo Complete W/ Cont if Necessary Per Protocol    Interpretation Summary  · Left ventricular systolic function is normal. Left ventricular ejection fraction appears to be 56-60%.  · The following left ventricular wall segments are hypokinetic: basal inferolateral and mid inferolateral.  · Left ventricular wall thickness is consistent with mild concentric hypertrophy.  · Left  ventricular diastolic dysfunction is noted.  · There is mild mitral valve prolapse of the anterior mitral leaflet.  · No hemodynamically significant valvular abnormalities identified on this study.    Cardiac catheterization 1/1/2021:  Impression:  1.  Acute inferior STEMI secondary to distal right coronary artery/PL branch occlusion, now status post successful PCI to the culprit vessel with synergy drug-eluting stent, also with successful PCI to the ostial right coronary artery as outlined above with synergy drug-eluting stent.  2.  Moderate to severe stenoses in the left anterior descending artery and left circumflex systems as outlined above.  Results:     Selective Coronary Angiography:     Left Main Coronary Artery: The left main coronary artery arises from the left coronary cusp and bifurcates into the LAD and left circumflex arteries.  There is mild disease noted in left main coronary artery.     Left Anterior Descending Artery: The left anterior descending artery arises from the distal left main coronary artery and supplies a first diagonal branch that appears to have a 60% stenosis in the midportion of this branch.  Following this, there is an additional small caliber diffusely diseased diagonal branch.  Distal to this branch, the LAD is calcified and diffusely diseased with irregularities up to 60 to 70%.  The LAD then terminates by wrapping the apex.      Left Circumflex: The left circumflex artery arises from the distal left main artery and supplies a first obtuse marginal branch which appears to have a hazy calcified 50-60% stenosis in the proximal third of the vessel.  At the takeoff of this branch, there is also at least a 40-50% stenosis.  The circumflex, after the takeoff of the first obtuse marginal branch has an 80% stenosis.  There is then a second obtuse marginal branch which has 2 terminal branches.  These are small caliber branches, however there is a 60-70% stenosis at the bifurcation of the  terminal branches.  There is also an AV groove vessel that has mild disease.     Right Coronary Artery: The right coronary artery arises from the right coronary cusp and has a severe stenosis in the proximal third of the vessel, followed by an aneurysmal segment in the midportion the vessel as well as ectasia distally, along with heavy calcification throughout the course the vessel.  The posterior descending artery is free of any significant disease, however there is an acute occlusion of the PL branch system just after the posterior descending artery takeoff.      Assessment:          Diagnosis Plan   1. Coronary artery disease involving native coronary artery of native heart with angina pectoris (HCC)  Stress Test With Myocardial Perfusion (1 Day)   2. Fatigue, unspecified type  Stress Test With Myocardial Perfusion (1 Day)   3. Dyspnea on exertion  Stress Test With Myocardial Perfusion (1 Day)          Plan:      The patient presents in follow-up today at the recommendation of his primary care physician as well as the gastroenterology nurse practitioner after recent evaluation.  The patient has been complaining of new onset chest tightness with associated fatigue.  He complains of exertional and rest chest pain.  He reports this to be mild in nature however similar to his pain at the time of his acute infarction in 2021.  He has completed 1 year of dual antiplatelet therapy was being worked up for iron deficiency anemia with possible plans for colonoscopy however due to his symptoms has been recommended to see cardiology first.  The patient was intolerant to long-acting nitrate therapy which was given to him several months ago.  He had been stable without symptoms even off of this medication for several months at his last follow-up.  He contracted Covid in January and has since gone downhill.  We will start the patient on ranolazine 500 mg twice daily, refill his sublingual nitroglycerin to use as needed, and  reevaluate him with ischemic work-up.  His primary care provider had ordered an echocardiogram given his known fatigue.  we will evaluate his chest tightness with nuclear perfusion study.    Further recommendations regarding follow-up after results of stress testing.

## 2022-03-30 ENCOUNTER — HOSPITAL ENCOUNTER (OUTPATIENT)
Dept: CARDIOLOGY | Facility: HOSPITAL | Age: 72
Discharge: HOME | End: 2022-03-30

## 2022-04-01 ENCOUNTER — HOSPITAL ENCOUNTER (OUTPATIENT)
Dept: CARDIOLOGY | Facility: HOSPITAL | Age: 72
Discharge: HOME | End: 2022-04-01

## 2022-04-01 ENCOUNTER — HOSPITAL ENCOUNTER (OUTPATIENT)
Dept: CARDIOLOGY | Facility: HOSPITAL | Age: 72
Discharge: HOME | End: 2022-04-01
Admitting: INTERNAL MEDICINE

## 2022-04-01 VITALS
HEART RATE: 54 BPM | SYSTOLIC BLOOD PRESSURE: 157 MMHG | WEIGHT: 208 LBS | BODY MASS INDEX: 30.81 KG/M2 | HEIGHT: 69 IN | DIASTOLIC BLOOD PRESSURE: 88 MMHG

## 2022-04-01 DIAGNOSIS — I25.118 CORONARY ARTERY DISEASE OF NATIVE ARTERY OF NATIVE HEART WITH STABLE ANGINA PECTORIS: ICD-10-CM

## 2022-04-01 DIAGNOSIS — Z86.16 HISTORY OF 2019 NOVEL CORONAVIRUS DISEASE (COVID-19): ICD-10-CM

## 2022-04-01 DIAGNOSIS — I50.32 CHRONIC DIASTOLIC CHF (CONGESTIVE HEART FAILURE): ICD-10-CM

## 2022-04-01 DIAGNOSIS — R53.83 FATIGUE, UNSPECIFIED TYPE: ICD-10-CM

## 2022-04-01 DIAGNOSIS — R06.09 DYSPNEA ON EXERTION: ICD-10-CM

## 2022-04-01 DIAGNOSIS — I25.119 CORONARY ARTERY DISEASE INVOLVING NATIVE CORONARY ARTERY OF NATIVE HEART WITH ANGINA PECTORIS: ICD-10-CM

## 2022-04-01 PROCEDURE — 25010000002 REGADENOSON 0.4 MG/5ML SOLUTION: Performed by: INTERNAL MEDICINE

## 2022-04-01 PROCEDURE — 0 TECHNETIUM SESTAMIBI: Performed by: NURSE PRACTITIONER

## 2022-04-01 PROCEDURE — 93017 CV STRESS TEST TRACING ONLY: CPT

## 2022-04-01 PROCEDURE — A9500 TC99M SESTAMIBI: HCPCS | Performed by: NURSE PRACTITIONER

## 2022-04-01 PROCEDURE — 78452 HT MUSCLE IMAGE SPECT MULT: CPT | Performed by: INTERNAL MEDICINE

## 2022-04-01 PROCEDURE — 78452 HT MUSCLE IMAGE SPECT MULT: CPT

## 2022-04-01 PROCEDURE — 93018 CV STRESS TEST I&R ONLY: CPT | Performed by: INTERNAL MEDICINE

## 2022-04-01 PROCEDURE — 93306 TTE W/DOPPLER COMPLETE: CPT | Performed by: INTERNAL MEDICINE

## 2022-04-01 PROCEDURE — 93306 TTE W/DOPPLER COMPLETE: CPT

## 2022-04-01 RX ADMIN — TECHNETIUM TC 99M SESTAMIBI 1 DOSE: 1 INJECTION INTRAVENOUS at 08:22

## 2022-04-01 RX ADMIN — REGADENOSON 0.4 MG: 0.08 INJECTION, SOLUTION INTRAVENOUS at 09:32

## 2022-04-01 RX ADMIN — TECHNETIUM TC 99M SESTAMIBI 1 DOSE: 1 INJECTION INTRAVENOUS at 09:37

## 2022-04-03 LAB
BH CV ECHO MEAS - ACS: 2.1 CM
BH CV ECHO MEAS - AO MAX PG (FULL): 2.2 MMHG
BH CV ECHO MEAS - AO MAX PG: 5.4 MMHG
BH CV ECHO MEAS - AO MEAN PG (FULL): 1 MMHG
BH CV ECHO MEAS - AO MEAN PG: 3 MMHG
BH CV ECHO MEAS - AO ROOT AREA (BSA CORRECTED): 1.5
BH CV ECHO MEAS - AO ROOT AREA: 8 CM^2
BH CV ECHO MEAS - AO ROOT DIAM: 3.2 CM
BH CV ECHO MEAS - AO V2 MAX: 116 CM/SEC
BH CV ECHO MEAS - AO V2 MEAN: 78.6 CM/SEC
BH CV ECHO MEAS - AO V2 VTI: 28.4 CM
BH CV ECHO MEAS - AVA(I,A): 2.7 CM^2
BH CV ECHO MEAS - AVA(I,D): 2.7 CM^2
BH CV ECHO MEAS - AVA(V,A): 2.7 CM^2
BH CV ECHO MEAS - AVA(V,D): 2.7 CM^2
BH CV ECHO MEAS - BSA(HAYCOCK): 2.2 M^2
BH CV ECHO MEAS - BSA: 2.1 M^2
BH CV ECHO MEAS - BZI_BMI: 30.6 KILOGRAMS/M^2
BH CV ECHO MEAS - BZI_METRIC_HEIGHT: 175.3 CM
BH CV ECHO MEAS - BZI_METRIC_WEIGHT: 93.9 KG
BH CV ECHO MEAS - EDV(CUBED): 124.3 ML
BH CV ECHO MEAS - EDV(MOD-SP2): 59.1 ML
BH CV ECHO MEAS - EDV(MOD-SP4): 80 ML
BH CV ECHO MEAS - EDV(TEICH): 117.7 ML
BH CV ECHO MEAS - EF(CUBED): 72.4 %
BH CV ECHO MEAS - EF(MOD-SP2): 55.5 %
BH CV ECHO MEAS - EF(MOD-SP4): 73.6 %
BH CV ECHO MEAS - EF(TEICH): 63.9 %
BH CV ECHO MEAS - ESV(CUBED): 34.3 ML
BH CV ECHO MEAS - ESV(MOD-SP2): 26.3 ML
BH CV ECHO MEAS - ESV(MOD-SP4): 21.1 ML
BH CV ECHO MEAS - ESV(TEICH): 42.5 ML
BH CV ECHO MEAS - FS: 34.9 %
BH CV ECHO MEAS - IVS/LVPW: 0.86
BH CV ECHO MEAS - IVSD: 0.99 CM
BH CV ECHO MEAS - LA DIMENSION: 3.8 CM
BH CV ECHO MEAS - LA/AO: 1.2
BH CV ECHO MEAS - LAT PEAK E' VEL: 7.6 CM/SEC
BH CV ECHO MEAS - LV DIASTOLIC VOL/BSA (35-75): 38.2 ML/M^2
BH CV ECHO MEAS - LV MASS(C)D: 200.6 GRAMS
BH CV ECHO MEAS - LV MASS(C)DI: 95.7 GRAMS/M^2
BH CV ECHO MEAS - LV MAX PG: 3.2 MMHG
BH CV ECHO MEAS - LV MEAN PG: 2 MMHG
BH CV ECHO MEAS - LV SYSTOLIC VOL/BSA (12-30): 10.1 ML/M^2
BH CV ECHO MEAS - LV V1 MAX: 89.4 CM/SEC
BH CV ECHO MEAS - LV V1 MEAN: 56.6 CM/SEC
BH CV ECHO MEAS - LV V1 VTI: 22.3 CM
BH CV ECHO MEAS - LVIDD: 5 CM
BH CV ECHO MEAS - LVIDS: 3.3 CM
BH CV ECHO MEAS - LVLD AP2: 6.7 CM
BH CV ECHO MEAS - LVLD AP4: 7.5 CM
BH CV ECHO MEAS - LVLS AP2: 4.8 CM
BH CV ECHO MEAS - LVLS AP4: 5 CM
BH CV ECHO MEAS - LVOT AREA (M): 3.5 CM^2
BH CV ECHO MEAS - LVOT AREA: 3.5 CM^2
BH CV ECHO MEAS - LVOT DIAM: 2.1 CM
BH CV ECHO MEAS - LVPWD: 1.2 CM
BH CV ECHO MEAS - MED PEAK E' VEL: 4.24 CM/SEC
BH CV ECHO MEAS - MR MAX PG: 25.3 MMHG
BH CV ECHO MEAS - MR MAX VEL: 250 CM/SEC
BH CV ECHO MEAS - MV A MAX VEL: 73.7 CM/SEC
BH CV ECHO MEAS - MV DEC SLOPE: 449 CM/SEC^2
BH CV ECHO MEAS - MV DEC TIME: 0.22 SEC
BH CV ECHO MEAS - MV E MAX VEL: 99 CM/SEC
BH CV ECHO MEAS - MV E/A: 1.3
BH CV ECHO MEAS - MV MAX PG: 3.9 MMHG
BH CV ECHO MEAS - MV MEAN PG: 2 MMHG
BH CV ECHO MEAS - MV P1/2T MAX VEL: 105 CM/SEC
BH CV ECHO MEAS - MV P1/2T: 68.5 MSEC
BH CV ECHO MEAS - MV V2 MAX: 99.2 CM/SEC
BH CV ECHO MEAS - MV V2 MEAN: 54.9 CM/SEC
BH CV ECHO MEAS - MV V2 VTI: 36.6 CM
BH CV ECHO MEAS - MVA P1/2T LCG: 2.1 CM^2
BH CV ECHO MEAS - MVA(P1/2T): 3.2 CM^2
BH CV ECHO MEAS - MVA(VTI): 2.1 CM^2
BH CV ECHO MEAS - RVDD: 3 CM
BH CV ECHO MEAS - SI(AO): 109 ML/M^2
BH CV ECHO MEAS - SI(CUBED): 42.9 ML/M^2
BH CV ECHO MEAS - SI(LVOT): 36.8 ML/M^2
BH CV ECHO MEAS - SI(MOD-SP2): 15.6 ML/M^2
BH CV ECHO MEAS - SI(MOD-SP4): 28.1 ML/M^2
BH CV ECHO MEAS - SI(TEICH): 35.9 ML/M^2
BH CV ECHO MEAS - SV(AO): 228.4 ML
BH CV ECHO MEAS - SV(CUBED): 89.9 ML
BH CV ECHO MEAS - SV(LVOT): 77.2 ML
BH CV ECHO MEAS - SV(MOD-SP2): 32.8 ML
BH CV ECHO MEAS - SV(MOD-SP4): 58.9 ML
BH CV ECHO MEAS - SV(TEICH): 75.2 ML
BH CV ECHO MEAS - TAPSE (>1.6): 1.9 CM
BH CV ECHO MEASUREMENTS AVERAGE E/E' RATIO: 16.72
LEFT ATRIUM VOLUME INDEX: 24.4 ML/M2
LEFT ATRIUM VOLUME: 51.2 CM3
MAXIMAL PREDICTED HEART RATE: 150 BPM
STRESS TARGET HR: 128 BPM

## 2022-04-04 NOTE — PROGRESS NOTES
Spoke with patient and noted understanding of results.     He is asking  about why he is still having pain under his ribs.

## 2022-04-06 ENCOUNTER — ANESTHESIA EVENT (OUTPATIENT)
Dept: GASTROENTEROLOGY | Facility: HOSPITAL | Age: 72
End: 2022-04-06

## 2022-04-06 ENCOUNTER — HOSPITAL ENCOUNTER (OUTPATIENT)
Facility: HOSPITAL | Age: 72
Setting detail: HOSPITAL OUTPATIENT SURGERY
Discharge: HOME | End: 2022-04-06
Attending: INTERNAL MEDICINE | Admitting: INTERNAL MEDICINE

## 2022-04-06 ENCOUNTER — ANESTHESIA (OUTPATIENT)
Dept: GASTROENTEROLOGY | Facility: HOSPITAL | Age: 72
End: 2022-04-06

## 2022-04-06 VITALS
RESPIRATION RATE: 15 BRPM | BODY MASS INDEX: 29.2 KG/M2 | SYSTOLIC BLOOD PRESSURE: 120 MMHG | OXYGEN SATURATION: 96 % | HEIGHT: 70 IN | DIASTOLIC BLOOD PRESSURE: 70 MMHG | TEMPERATURE: 97 F | WEIGHT: 204 LBS | HEART RATE: 56 BPM

## 2022-04-06 DIAGNOSIS — R53.83 MALAISE AND FATIGUE: ICD-10-CM

## 2022-04-06 DIAGNOSIS — D64.9 ANEMIA, UNSPECIFIED TYPE: ICD-10-CM

## 2022-04-06 DIAGNOSIS — D50.8 OTHER IRON DEFICIENCY ANEMIA: ICD-10-CM

## 2022-04-06 DIAGNOSIS — Z86.010 HX OF COLONIC POLYPS: ICD-10-CM

## 2022-04-06 DIAGNOSIS — R53.81 MALAISE AND FATIGUE: ICD-10-CM

## 2022-04-06 DIAGNOSIS — K21.9 GASTROESOPHAGEAL REFLUX DISEASE, UNSPECIFIED WHETHER ESOPHAGITIS PRESENT: ICD-10-CM

## 2022-04-06 DIAGNOSIS — R23.1 PALENESS: ICD-10-CM

## 2022-04-06 DIAGNOSIS — R12 HEARTBURN: ICD-10-CM

## 2022-04-06 PROBLEM — D50.9 IRON DEFICIENCY ANEMIA: Status: ACTIVE | Noted: 2022-03-09

## 2022-04-06 LAB — GLUCOSE BLDC GLUCOMTR-MCNC: 102 MG/DL (ref 70–130)

## 2022-04-06 PROCEDURE — 45380 COLONOSCOPY AND BIOPSY: CPT | Performed by: INTERNAL MEDICINE

## 2022-04-06 PROCEDURE — 43239 EGD BIOPSY SINGLE/MULTIPLE: CPT | Performed by: INTERNAL MEDICINE

## 2022-04-06 PROCEDURE — 25010000002 PROPOFOL 10 MG/ML EMULSION: Performed by: NURSE ANESTHETIST, CERTIFIED REGISTERED

## 2022-04-06 PROCEDURE — 45385 COLONOSCOPY W/LESION REMOVAL: CPT | Performed by: INTERNAL MEDICINE

## 2022-04-06 PROCEDURE — 87081 CULTURE SCREEN ONLY: CPT | Performed by: INTERNAL MEDICINE

## 2022-04-06 PROCEDURE — 88305 TISSUE EXAM BY PATHOLOGIST: CPT | Performed by: INTERNAL MEDICINE

## 2022-04-06 PROCEDURE — 82962 GLUCOSE BLOOD TEST: CPT

## 2022-04-06 RX ORDER — PANTOPRAZOLE SODIUM 40 MG/1
40 TABLET, DELAYED RELEASE ORAL DAILY
Qty: 30 TABLET | Refills: 11 | Status: SHIPPED | OUTPATIENT
Start: 2022-04-06 | End: 2023-01-18 | Stop reason: SDUPTHER

## 2022-04-06 RX ORDER — SODIUM CHLORIDE 0.9 % (FLUSH) 0.9 %
10 SYRINGE (ML) INJECTION EVERY 12 HOURS SCHEDULED
Status: CANCELLED | OUTPATIENT
Start: 2022-04-06

## 2022-04-06 RX ORDER — SODIUM CHLORIDE 0.9 % (FLUSH) 0.9 %
10 SYRINGE (ML) INJECTION AS NEEDED
Status: DISCONTINUED | OUTPATIENT
Start: 2022-04-06 | End: 2022-04-06 | Stop reason: HOSPADM

## 2022-04-06 RX ORDER — LIDOCAINE HYDROCHLORIDE 10 MG/ML
0.5 INJECTION, SOLUTION EPIDURAL; INFILTRATION; INTRACAUDAL; PERINEURAL ONCE AS NEEDED
Status: CANCELLED | OUTPATIENT
Start: 2022-04-06

## 2022-04-06 RX ORDER — SODIUM CHLORIDE 0.9 % (FLUSH) 0.9 %
10 SYRINGE (ML) INJECTION AS NEEDED
Status: CANCELLED | OUTPATIENT
Start: 2022-04-06

## 2022-04-06 RX ORDER — SODIUM CHLORIDE 9 MG/ML
100 INJECTION, SOLUTION INTRAVENOUS CONTINUOUS
Status: CANCELLED | OUTPATIENT
Start: 2022-04-06

## 2022-04-06 RX ORDER — SODIUM CHLORIDE 9 MG/ML
500 INJECTION, SOLUTION INTRAVENOUS CONTINUOUS PRN
Status: DISCONTINUED | OUTPATIENT
Start: 2022-04-06 | End: 2022-04-06 | Stop reason: HOSPADM

## 2022-04-06 RX ORDER — PROPOFOL 10 MG/ML
VIAL (ML) INTRAVENOUS AS NEEDED
Status: DISCONTINUED | OUTPATIENT
Start: 2022-04-06 | End: 2022-04-06 | Stop reason: SURG

## 2022-04-06 RX ADMIN — SODIUM CHLORIDE 500 ML: 9 INJECTION, SOLUTION INTRAVENOUS at 10:05

## 2022-04-06 RX ADMIN — PROPOFOL 600 MG: 10 INJECTION, EMULSION INTRAVENOUS at 11:53

## 2022-04-06 NOTE — NURSING NOTE
"Dr. Garland talked to pt pre-procedure and explained in detail about using sedation. Pt agreed to use sedation, \"stated I have never had it so I didn't know\".  "

## 2022-04-06 NOTE — ANESTHESIA PREPROCEDURE EVALUATION
Anesthesia Evaluation     Patient summary reviewed   no history of anesthetic complications:  NPO Solid Status: > 6 hours  NPO Liquid Status: > 6 hours           Airway   Mallampati: II  Dental      Pulmonary    Cardiovascular   Exercise tolerance: good (4-7 METS)    (+) past MI , CAD, cardiac stents more than 12 months ago CHF Diastolic >=55%, hyperlipidemia,     ROS comment: · Left ventricular ejection fraction appears to be 66 - 70%. Left ventricular systolic function is normal.  · Left ventricular diastolic function is consistent with (grade Ia w/high LAP) impaired relaxation.        Neuro/Psych  GI/Hepatic/Renal/Endo    (+) obesity,  GERD,  diabetes mellitus type 2,     Musculoskeletal     Abdominal    Substance History      OB/GYN          Other                        Anesthesia Plan    ASA 3     MAC     intravenous induction     Anesthetic plan, all risks, benefits, and alternatives have been provided, discussed and informed consent has been obtained with: patient and spouse/significant other.        CODE STATUS:

## 2022-04-06 NOTE — ANESTHESIA POSTPROCEDURE EVALUATION
Patient: Aren Beltrán    Procedure Summary     Date: 04/06/22 Room / Location: [facility] ENDOSCOPY 4 / [facility] END[facility]    Anesthesia Start: 1153 Anesthesia Stop: 1237    Procedures:       ESOPHAGOGASTRODUODENOSCOPY PT REQUEST NO SEDATION (N/A )      COLONOSCOPY PT REQUEST NO SEDATION (N/A ) Diagnosis:       Anemia, unspecified type      Hx of colonic polyps      Gastroesophageal reflux disease, unspecified whether esophagitis present      Heartburn      Malaise and fatigue      Paleness      Other iron deficiency anemia      (Anemia, unspecified type [D64.9])      (Hx of colonic polyps [Z86.010])      (Gastroesophageal reflux disease, unspecified whether esophagitis present [K21.9])      (Heartburn [R12])      (Malaise and fatigue [R53.81, R53.83])      (Paleness [R23.1])      (Other iron deficiency anemia [D50.8])    Surgeons: Laury Rodriguez MD Provider: Dean Powell CRNA    Anesthesia Type: MAC ASA Status: 3          Anesthesia Type: MAC    Vitals  Vitals Value Taken Time   /70 04/06/22 1251   Temp     Pulse 53 04/06/22 1251   Resp 15 04/06/22 1250   SpO2 96 % 04/06/22 1251   Vitals shown include unvalidated device data.        Post Anesthesia Care and Evaluation    Patient location during evaluation: PHASE II  Patient participation: complete - patient participated  Level of consciousness: awake  Pain score: 0  Pain management: adequate  Airway patency: patent  Anesthetic complications: No anesthetic complications  PONV Status: none  Cardiovascular status: acceptable  Respiratory status: acceptable  Hydration status: acceptable

## 2022-04-07 LAB — UREASE TISS QL: NEGATIVE

## 2022-04-09 LAB
CYTO UR: NORMAL
LAB AP CASE REPORT: NORMAL
PATH REPORT.FINAL DX SPEC: NORMAL
PATH REPORT.GROSS SPEC: NORMAL

## 2022-04-12 LAB
BH CV REST NUCLEAR ISOTOPE DOSE: 11.6 MCI
BH CV STRESS BP STAGE 1: NORMAL
BH CV STRESS COMMENTS STAGE 1: NORMAL
BH CV STRESS DOSE REGADENOSON STAGE 1: 0.4
BH CV STRESS DURATION MIN STAGE 1: 0
BH CV STRESS DURATION SEC STAGE 1: 10
BH CV STRESS HR STAGE 1: 73
BH CV STRESS NUCLEAR ISOTOPE DOSE: 34 MCI
BH CV STRESS PROTOCOL 1: NORMAL
BH CV STRESS RECOVERY BP: NORMAL MMHG
BH CV STRESS RECOVERY HR: 77 BPM
BH CV STRESS STAGE 1: 1
LV EF NUC BP: 87 %
MAXIMAL PREDICTED HEART RATE: 150 BPM
PERCENT MAX PREDICTED HR: 48.67 %
STRESS BASELINE BP: NORMAL MMHG
STRESS BASELINE HR: 54 BPM
STRESS PERCENT HR: 57 %
STRESS POST EXERCISE DUR SEC: 10 SEC
STRESS POST PEAK BP: NORMAL MMHG
STRESS POST PEAK HR: 73 BPM
STRESS TARGET HR: 128 BPM

## 2022-04-18 ENCOUNTER — TELEPHONE (OUTPATIENT)
Dept: GASTROENTEROLOGY | Facility: CLINIC | Age: 72
End: 2022-04-18

## 2022-04-18 NOTE — TELEPHONE ENCOUNTER
Please let him know that his small bowel biopsies were normal.  Biopsies from the esophagus do indeed show Guzman's esophagus.  He will need a repeat endoscopy in 3 years.  Lastly, biopsies from the colon did show inflammation.  He did not have true colon polyps.  These were inflammatory polyps consistent with inflammation.  At this point, advised him to keep his office visit with me in June as planned.    Laury Rodriguez MD

## 2022-04-18 NOTE — TELEPHONE ENCOUNTER
Called and spoke to pt re: results-he VU. I have placed him in recall for 4/2025. Pt will keep f/u with June as scheduled with Dr. Rodriguez and was advised to call me back with further questions/problems.

## 2022-04-20 ENCOUNTER — OFFICE VISIT (OUTPATIENT)
Dept: INTERNAL MEDICINE | Facility: CLINIC | Age: 72
End: 2022-04-20

## 2022-04-20 VITALS
RESPIRATION RATE: 15 BRPM | HEIGHT: 68 IN | HEART RATE: 59 BPM | SYSTOLIC BLOOD PRESSURE: 132 MMHG | TEMPERATURE: 98.4 F | BODY MASS INDEX: 31.52 KG/M2 | WEIGHT: 208 LBS | DIASTOLIC BLOOD PRESSURE: 78 MMHG | OXYGEN SATURATION: 100 %

## 2022-04-20 DIAGNOSIS — S91.312A LACERATION OF LEFT FOOT, INITIAL ENCOUNTER: Primary | ICD-10-CM

## 2022-04-20 PROCEDURE — 90471 IMMUNIZATION ADMIN: CPT | Performed by: INTERNAL MEDICINE

## 2022-04-20 PROCEDURE — 99213 OFFICE O/P EST LOW 20 MIN: CPT | Performed by: INTERNAL MEDICINE

## 2022-04-20 PROCEDURE — 90715 TDAP VACCINE 7 YRS/> IM: CPT | Performed by: INTERNAL MEDICINE

## 2022-04-20 RX ORDER — AMOXICILLIN AND CLAVULANATE POTASSIUM 875; 125 MG/1; MG/1
1 TABLET, FILM COATED ORAL 2 TIMES DAILY
Qty: 14 TABLET | Refills: 0 | Status: SHIPPED | OUTPATIENT
Start: 2022-04-20 | End: 2022-04-27

## 2022-04-20 NOTE — PROGRESS NOTES
Chief Complaint   Patient presents with   • Office Visit   • Foot Injury         History:  Aren Beltrán is a 71 y.o. male who presents today for evaluation of the above problems.    Presents for acute visit.  On Sunday he sustained a laceration to the heel of his left foot.  He was not wearing shoes, stepped on a trash bag and got cut by an aluminum tuna can.  The wound has mostly stopped bleeding, but does bruise a little.  It does not hurt.  It only hurts a little to change the bandage.  His blood sugar has been okay and his last A1c was 5.6%.  He is concerned due to his diabetes and underlying neuropathy of the wound getting worse.    He is not aware of the date of his last tetanus vaccine    HPI      ROS:  Review of Systems  As above    Current Outpatient Medications:   •  Ascorbic Acid (VITAMIN C ADULT GUMMIES PO), Take 1 tablet by mouth Daily., Disp: , Rfl:   •  aspirin 81 MG EC tablet, Take 1 tablet by mouth Daily., Disp: 30 tablet, Rfl: 11  •  atorvastatin (LIPITOR) 20 MG tablet, Take 1 tablet by mouth every night at bedtime., Disp: 90 tablet, Rfl: 1  •  coenzyme Q10 100 MG capsule, Take 100 mg by mouth Daily., Disp: , Rfl:   •  Cyanocobalamin (VITAMIN B12 PO), Take 1 tablet by mouth Daily., Disp: , Rfl:   •  Ferrous Sulfate (IRON PO), Take  by mouth Daily., Disp: , Rfl:   •  glucose blood (Contour Next Test) test strip, Use as instructed, Disp: 200 each, Rfl: 0  •  insulin detemir (Levemir) 100 UNIT/ML injection, Inject 20 Units under the skin into the appropriate area as directed Daily., Disp: , Rfl:   •  insulin lispro (HumaLOG) 100 UNIT/ML injection, Inject 5 Units under the skin into the appropriate area as directed 3 (Three) Times a Day Before Meals., Disp: 45 mL, Rfl: 5  •  lisinopril (PRINIVIL,ZESTRIL) 2.5 MG tablet, Take 1 tablet by mouth Daily., Disp: 30 tablet, Rfl: 5  •  metFORMIN (GLUCOPHAGE) 500 MG tablet, TAKE 1 TABLET BY MOUTH TWICE A DAY WITH MEALS, Disp: 180 tablet, Rfl: 1  •  metoprolol  tartrate (LOPRESSOR) 25 MG tablet, Take 1 tablet by mouth Every 12 (Twelve) Hours., Disp: 180 tablet, Rfl: 1  •  Microlet Lancets misc, USE TO CHECK BLOOD SUGAR 4 TIMES A DAY BEFORE MEALS AND AT BEDTIME *INS ONLY PAY FOR 30 DAY SUPPLY*, Disp: 200 each, Rfl: 11  •  nitroglycerin (NITROSTAT) 0.4 MG SL tablet, Place 1 tablet under the tongue Every 5 (Five) Minutes As Needed for Chest Pain. Take no more than 3 doses in 15 minutes., Disp: 25 tablet, Rfl: 2  •  pantoprazole (PROTONIX) 40 MG EC tablet, Take 1 tablet by mouth Daily., Disp: 30 tablet, Rfl: 11  •  ranolazine (Ranexa) 500 MG 12 hr tablet, Take 1 tablet by mouth 2 (Two) Times a Day., Disp: 60 tablet, Rfl: 5  •  amoxicillin-clavulanate (Augmentin) 875-125 MG per tablet, Take 1 tablet by mouth 2 (Two) Times a Day for 7 days., Disp: 14 tablet, Rfl: 0    Lab Results   Component Value Date    GLUCOSE 88 02/25/2022    BUN 23 02/25/2022    CREATININE 1.18 02/25/2022    EGFRIFNONA 61 02/25/2022    EGFRIFAFRI 78 01/01/2021    BCR 19.5 02/25/2022    K 5.1 02/25/2022    CO2 23.2 02/25/2022    CALCIUM 9.7 02/25/2022    ALBUMIN 4.70 02/25/2022    AST 13 02/25/2022    ALT 6 02/25/2022       WBC   Date Value Ref Range Status   02/25/2022 9.72 3.40 - 10.80 10*3/mm3 Final     RBC   Date Value Ref Range Status   02/25/2022 5.35 4.14 - 5.80 10*6/mm3 Final     Hemoglobin   Date Value Ref Range Status   02/25/2022 12.3 (L) 13.0 - 17.7 g/dL Final     Hematocrit   Date Value Ref Range Status   02/25/2022 41.0 37.5 - 51.0 % Final     MCV   Date Value Ref Range Status   02/25/2022 76.6 (L) 79.0 - 97.0 fL Final     MCH   Date Value Ref Range Status   02/25/2022 23.0 (L) 26.6 - 33.0 pg Final     MCHC   Date Value Ref Range Status   02/25/2022 30.0 (L) 31.5 - 35.7 g/dL Final     RDW   Date Value Ref Range Status   02/25/2022 18.7 (H) 12.3 - 15.4 % Final     RDW-SD   Date Value Ref Range Status   02/25/2022 49.5 37.0 - 54.0 fl Final     MPV   Date Value Ref Range Status   02/25/2022 9.3  "6.0 - 12.0 fL Final     Platelets   Date Value Ref Range Status   02/25/2022 238 140 - 450 10*3/mm3 Final     Neutrophil %   Date Value Ref Range Status   01/01/2021 69.1 42.7 - 76.0 % Final     Lymphocyte %   Date Value Ref Range Status   01/01/2021 19.2 (L) 19.6 - 45.3 % Final     Monocyte %   Date Value Ref Range Status   01/01/2021 7.7 5.0 - 12.0 % Final     Eosinophil %   Date Value Ref Range Status   01/01/2021 1.9 0.3 - 6.2 % Final     Basophil %   Date Value Ref Range Status   01/01/2021 1.1 0.0 - 1.5 % Final     Immature Grans %   Date Value Ref Range Status   01/01/2021 1.0 (H) 0.0 - 0.5 % Final     Neutrophils, Absolute   Date Value Ref Range Status   01/01/2021 6.04 1.70 - 7.00 10*3/mm3 Final     Lymphocytes, Absolute   Date Value Ref Range Status   01/01/2021 1.68 0.70 - 3.10 10*3/mm3 Final     Monocytes, Absolute   Date Value Ref Range Status   01/01/2021 0.67 0.10 - 0.90 10*3/mm3 Final     Eosinophils, Absolute   Date Value Ref Range Status   01/01/2021 0.17 0.00 - 0.40 10*3/mm3 Final     Basophils, Absolute   Date Value Ref Range Status   01/01/2021 0.10 0.00 - 0.20 10*3/mm3 Final     Immature Grans, Absolute   Date Value Ref Range Status   01/01/2021 0.09 (H) 0.00 - 0.05 10*3/mm3 Final     nRBC   Date Value Ref Range Status   01/01/2021 0.0 0.0 - 0.2 /100 WBC Final         OBJECTIVE:  Visit Vitals  /78 (BP Location: Left arm, Patient Position: Sitting, Cuff Size: Adult)   Pulse 59   Temp 98.4 °F (36.9 °C) (Oral)   Resp 15   Ht 172.7 cm (67.99\")   Wt 94.3 kg (208 lb)   SpO2 100%   BMI 31.63 kg/m²      Physical Exam  Constitutional:       General: He is not in acute distress.     Appearance: He is well-developed. He is not ill-appearing or toxic-appearing.   HENT:      Head: Normocephalic and atraumatic.   Eyes:      Pupils: Pupils are equal, round, and reactive to light.   Neck:      Thyroid: No thyromegaly.      Trachea: Phonation normal.   Pulmonary:      Effort: No respiratory distress. "   Musculoskeletal:        Feet:    Skin:     Coloration: Skin is not pale.      Findings: No erythema.      Comments: Laceration on the posterior left heel without signs of infection   Neurological:      Mental Status: He is alert.   Psychiatric:         Behavior: Behavior normal.         Thought Content: Thought content normal.         Judgment: Judgment normal.         Assessment/Plan    Diagnoses and all orders for this visit:    1. Laceration of left foot, initial encounter (Primary)  -     Tdap Vaccine Greater Than or Equal To 6yo IM  -     amoxicillin-clavulanate (Augmentin) 875-125 MG per tablet; Take 1 tablet by mouth 2 (Two) Times a Day for 7 days.  Dispense: 14 tablet; Refill: 0      Do not leave he has an infection at this time, but is high risk of developing infection given the nature of the injury, and his underlying diabetes.  I am going to prescribe Augmentin for 7 days, continue antibiotic ointment for 2 weeks, and give a tetanus vaccine today in the office.  Follow-up at the next regularly scheduled appointment, or sooner if needed.    He was instructed to let me know if the wound worsens at all.  I do not believe it requires any sutures and would expect it to heal by secondary intention    Return for Next scheduled follow up.      SUNITA Puentes MD  13:08 CDT  4/20/2022

## 2022-04-20 NOTE — PATIENT INSTRUCTIONS
-Let me know if your wound gets worse  -take the course of oral antibiotics  -use triple antibiotic ointment twice a day for 2 weeks.

## 2022-06-08 ENCOUNTER — OFFICE VISIT (OUTPATIENT)
Dept: CARDIOLOGY | Facility: CLINIC | Age: 72
End: 2022-06-08

## 2022-06-08 VITALS
DIASTOLIC BLOOD PRESSURE: 62 MMHG | HEIGHT: 68 IN | HEART RATE: 59 BPM | SYSTOLIC BLOOD PRESSURE: 130 MMHG | OXYGEN SATURATION: 100 % | BODY MASS INDEX: 32.28 KG/M2 | WEIGHT: 213 LBS

## 2022-06-08 DIAGNOSIS — E11.65 TYPE 2 DIABETES MELLITUS WITH HYPERGLYCEMIA, WITHOUT LONG-TERM CURRENT USE OF INSULIN: Chronic | ICD-10-CM

## 2022-06-08 DIAGNOSIS — I25.118 CORONARY ARTERY DISEASE OF NATIVE ARTERY OF NATIVE HEART WITH STABLE ANGINA PECTORIS: Primary | Chronic | ICD-10-CM

## 2022-06-08 DIAGNOSIS — E78.2 MIXED HYPERLIPIDEMIA: Chronic | ICD-10-CM

## 2022-06-08 DIAGNOSIS — E66.2 CLASS 1 OBESITY WITH ALVEOLAR HYPOVENTILATION, SERIOUS COMORBIDITY, AND BODY MASS INDEX (BMI) OF 32.0 TO 32.9 IN ADULT: Chronic | ICD-10-CM

## 2022-06-08 PROBLEM — R12 HEARTBURN: Status: RESOLVED | Noted: 2022-03-09 | Resolved: 2022-06-08

## 2022-06-08 PROBLEM — D50.9 IRON DEFICIENCY ANEMIA: Chronic | Status: ACTIVE | Noted: 2022-03-09

## 2022-06-08 PROBLEM — R53.81 MALAISE AND FATIGUE: Status: RESOLVED | Noted: 2022-03-09 | Resolved: 2022-06-08

## 2022-06-08 PROBLEM — R53.83 MALAISE AND FATIGUE: Status: RESOLVED | Noted: 2022-03-09 | Resolved: 2022-06-08

## 2022-06-08 PROBLEM — E66.811 CLASS 1 OBESITY WITH ALVEOLAR HYPOVENTILATION, SERIOUS COMORBIDITY, AND BODY MASS INDEX (BMI) OF 32.0 TO 32.9 IN ADULT: Chronic | Status: ACTIVE | Noted: 2021-01-02

## 2022-06-08 PROBLEM — E66.811 CLASS 1 OBESITY WITH ALVEOLAR HYPOVENTILATION, SERIOUS COMORBIDITY, AND BODY MASS INDEX (BMI) OF 32.0 TO 32.9 IN ADULT: Status: ACTIVE | Noted: 2021-01-02

## 2022-06-08 PROBLEM — E66.9 OBESITY (BMI 30-39.9): Chronic | Status: ACTIVE | Noted: 2021-01-02

## 2022-06-08 PROCEDURE — 93000 ELECTROCARDIOGRAM COMPLETE: CPT | Performed by: NURSE PRACTITIONER

## 2022-06-08 PROCEDURE — 99214 OFFICE O/P EST MOD 30 MIN: CPT | Performed by: NURSE PRACTITIONER

## 2022-06-08 NOTE — PROGRESS NOTES
Subjective:     Encounter Date:06/08/2022      Patient ID: Aren Beltrán is a 72 y.o. male known coronary artery disease, inferior ST elevation myocardial infarction Jan 2021, type II diabetes mellitus with insulin use, hyperlipidemia, and obesity who presents the office today for     Chief Complaint: Routine CAD follow up   Coronary Artery Disease  Presents for follow-up visit. Pertinent negatives include no chest pain, chest pressure, chest tightness, dizziness, leg swelling, palpitations, shortness of breath or weight gain. The symptoms have been stable. Compliance with diet is good. Compliance with exercise is good. Compliance with medications is good.     Mr. Beltrán presents today for follow-up.  His previous visit in our office was 4 complaints of chest tightness.  He had stress testing performed after that visit which was felt to be low risk.  He was started on ranolazine however stopped this medication after just 1 month and has had no recurrence of chest tightness since that time.  He previously had been started on isosorbide mononitrate for complaints of chest pain however did not tolerate that medication due to side effects and hypotension.     Since his last visit he denies any uses of sublingual nitroglycerin.  He reports no complaints of chest pain or chest tightness.  He denies any significant shortness of breath, lightheadedness, dizziness.  He continues to monitor his blood sugars closely and reports well-controlled blood pressure.  He follows routinely with his primary care physician.    He feels well.  He stopped taking the Ranexa on his own and has had no symptoms or recurrence of complaints since being off of that medication.  He is willing to go back on this in the future if needed as he tolerated the medication well.      The following portions of the patient's history were reviewed and updated as appropriate: allergies, current medications, past family history, past medical history,  past social history and past surgical history.     Allergies   Allergen Reactions   • Ozempic (0.25 Or 0.5 Mg-Dose) [Semaglutide(0.25 Or 0.5mg-Dos)] Diarrhea          Current Outpatient Medications:   •  Ascorbic Acid (VITAMIN C ADULT GUMMIES PO), Take 1 tablet by mouth Daily., Disp: , Rfl:   •  aspirin 81 MG EC tablet, Take 1 tablet by mouth Daily., Disp: 30 tablet, Rfl: 11  •  atorvastatin (LIPITOR) 20 MG tablet, Take 1 tablet by mouth every night at bedtime., Disp: 90 tablet, Rfl: 1  •  coenzyme Q10 100 MG capsule, Take 100 mg by mouth Daily., Disp: , Rfl:   •  Cyanocobalamin (VITAMIN B12 PO), Take 1 tablet by mouth Daily., Disp: , Rfl:   •  Ferrous Sulfate (IRON PO), Take  by mouth Daily., Disp: , Rfl:   •  glucose blood (Contour Next Test) test strip, Use as instructed, Disp: 200 each, Rfl: 0  •  insulin detemir (Levemir) 100 UNIT/ML injection, Inject 20 Units under the skin into the appropriate area as directed Daily., Disp: , Rfl:   •  insulin lispro (HumaLOG) 100 UNIT/ML injection, Inject 5 Units under the skin into the appropriate area as directed 3 (Three) Times a Day Before Meals., Disp: 45 mL, Rfl: 5  •  lisinopril (PRINIVIL,ZESTRIL) 2.5 MG tablet, Take 1 tablet by mouth Daily., Disp: 30 tablet, Rfl: 5  •  metFORMIN (GLUCOPHAGE) 500 MG tablet, TAKE 1 TABLET BY MOUTH TWICE A DAY WITH MEALS, Disp: 180 tablet, Rfl: 1  •  metoprolol tartrate (LOPRESSOR) 25 MG tablet, Take 1 tablet by mouth Every 12 (Twelve) Hours., Disp: 180 tablet, Rfl: 1  •  Microlet Lancets misc, USE TO CHECK BLOOD SUGAR 4 TIMES A DAY BEFORE MEALS AND AT BEDTIME *INS ONLY PAY FOR 30 DAY SUPPLY*, Disp: 200 each, Rfl: 11  •  nitroglycerin (NITROSTAT) 0.4 MG SL tablet, Place 1 tablet under the tongue Every 5 (Five) Minutes As Needed for Chest Pain. Take no more than 3 doses in 15 minutes., Disp: 25 tablet, Rfl: 2  •  pantoprazole (PROTONIX) 40 MG EC tablet, Take 1 tablet by mouth Daily., Disp: 30 tablet, Rfl: 11     Review of Systems  "  Constitutional: Negative for diaphoresis, fever, malaise/fatigue, weight gain and weight loss.   HENT: Positive for hearing loss.    Cardiovascular: Negative for chest pain, dyspnea on exertion, leg swelling, near-syncope, orthopnea, palpitations, paroxysmal nocturnal dyspnea and syncope.   Respiratory: Negative for chest tightness, shortness of breath and wheezing.    Hematologic/Lymphatic: Negative for bleeding problem. Does not bruise/bleed easily.   Musculoskeletal: Negative for falls.   Gastrointestinal: Negative for bloating, hematochezia and melena.   Neurological: Negative for dizziness and light-headedness.   Psychiatric/Behavioral: Negative for altered mental status.       ECG 12 Lead    Date/Time: 6/8/2022 12:49 PM  Performed by: Nany Murray APRN  Authorized by: Nany Murray APRN   Comparison: compared with previous ECG from 3/11/2022  Similar to previous ECG  Rhythm: sinus bradycardia  Rate: bradycardic  BPM: 58  Conduction: 1st degree AV block  QRS axis: normal  Other findings: non-specific ST-T wave changes    Clinical impression: abnormal EKG            /62   Pulse 59   Ht 172.7 cm (67.99\")   Wt 96.6 kg (213 lb)   SpO2 100%   BMI 32.39 kg/m²        Objective:     Vitals reviewed.   Constitutional:       General: Awake. Not in acute distress.     Appearance: Normal appearance. Well-developed, well-groomed and not in distress. Chronically ill-appearing. Not diaphoretic.   Eyes:      General:         Right eye: No discharge.         Left eye: No discharge.   HENT:      Head: Normocephalic and atraumatic.    Mouth/Throat:      Pharynx: No oropharyngeal exudate.   Pulmonary:      Effort: Pulmonary effort is normal. No respiratory distress.      Breath sounds: Normal breath sounds. No wheezing. No rhonchi. No rales.   Chest:      Chest wall: Not tender to palpatation.   Cardiovascular:      Bradycardia present. Regular rhythm.      Murmurs: There is no murmur.      No gallop. No rub. "   Edema:     Peripheral edema absent.   Abdominal:      General: There is no distension.      Palpations: Abdomen is soft.      Tenderness: There is no abdominal tenderness.   Musculoskeletal: Normal range of motion.      Cervical back: Normal range of motion and neck supple. Skin:     General: Skin is warm and dry.      Findings: No erythema.   Neurological:      Mental Status: Alert, oriented to person, place, and time and oriented to person, place and time.   Psychiatric:         Attention and Perception: Attention normal.         Mood and Affect: Mood normal.         Speech: Speech normal.         Behavior: Behavior normal. Behavior is cooperative.         Thought Content: Thought content normal.         Cognition and Memory: Cognition normal.         Judgment: Judgment normal.     Lab Review:   Interpretation Summary  Myocardial Perfusion Study   · Myocardial perfusion imaging indicates a normal myocardial perfusion study with no evidence of ischemia.  · Left ventricular ejection fraction is hyperdynamic (Calculated EF > 70%).  · Impressions are consistent with a low risk study.        Lab Results   Component Value Date    CHOL 113 02/25/2022    TRIG 69 02/25/2022    HDL 64 (H) 02/25/2022    LDL 35 02/25/2022         Results for orders placed during the hospital encounter of 04/01/22    Adult Transthoracic Echo Complete W/ Cont if Necessary Per Protocol    Interpretation Summary  · Left ventricular ejection fraction appears to be 66 - 70%. Left ventricular systolic function is normal.  · Left ventricular diastolic function is consistent with (grade Ia w/high LAP) impaired relaxation.    Cardiac catheterization 1/1/2021:  Impression:  1.  Acute inferior STEMI secondary to distal right coronary artery/PL branch occlusion, now status post successful PCI to the culprit vessel with synergy drug-eluting stent, also with successful PCI to the ostial right coronary artery as outlined above with synergy drug-eluting  stent.  2.  Moderate to severe stenoses in the left anterior descending artery and left circumflex systems as outlined above.  Results:     Selective Coronary Angiography:     Left Main Coronary Artery: The left main coronary artery arises from the left coronary cusp and bifurcates into the LAD and left circumflex arteries.  There is mild disease noted in left main coronary artery.     Left Anterior Descending Artery: The left anterior descending artery arises from the distal left main coronary artery and supplies a first diagonal branch that appears to have a 60% stenosis in the midportion of this branch.  Following this, there is an additional small caliber diffusely diseased diagonal branch.  Distal to this branch, the LAD is calcified and diffusely diseased with irregularities up to 60 to 70%.  The LAD then terminates by wrapping the apex.      Left Circumflex: The left circumflex artery arises from the distal left main artery and supplies a first obtuse marginal branch which appears to have a hazy calcified 50-60% stenosis in the proximal third of the vessel.  At the takeoff of this branch, there is also at least a 40-50% stenosis.  The circumflex, after the takeoff of the first obtuse marginal branch has an 80% stenosis.  There is then a second obtuse marginal branch which has 2 terminal branches.  These are small caliber branches, however there is a 60-70% stenosis at the bifurcation of the terminal branches.  There is also an AV groove vessel that has mild disease.     Right Coronary Artery: The right coronary artery arises from the right coronary cusp and has a severe stenosis in the proximal third of the vessel, followed by an aneurysmal segment in the midportion the vessel as well as ectasia distally, along with heavy calcification throughout the course the vessel.  The posterior descending artery is free of any significant disease, however there is an acute occlusion of the PL branch system just after  the posterior descending artery takeoff.      Assessment:          Diagnosis Plan   1. Coronary artery disease of native artery of native heart with stable angina pectoris ([facility])     2. Mixed hyperlipidemia     3. Type 2 diabetes mellitus with hyperglycemia, without long-term current use of insulin ([facility])     4. Class 1 obesity with alveolar hypoventilation, serious comorbidity, and body mass index (BMI) of 32.0 to 32.9 in adult ([facility])            Plan:      -Coronary artery disease: Stable.  No further complaints of chest pain or chest tightness.  He previously had complaints and was started on isosorbide however did not tolerate the medication due to side effects and hypotension.  At his most recent visit when he complained about chest tightness stress testing was ordered and the patient was started on Ranexa for management.  He took this medication for approximately 1 month however did not refill the medication thereafter.  Since being off the medication now approximately 2 months, he has been stable without recurrence of complaints.  Continue current medications including aspirin, statin, beta-blocker therapies.  Although intolerant to Imdur the patient tolerated Ranexa and could restart this medication in the future for symptom management if needed.    -Hyperlipidemia: LDL goal of 70 or less given known CAD.  He is currently managed on atorvastatin.  Lipids are managed through his primary care office.  His most recent lipid panel is referenced above.  LDL well controlled at 35.    -Diabetes mellitus: Currently managed on insulin.  Managed through his primary care physician's office.  He monitors his glucose at home and reports well-controlled blood sugars.  Tight glycemic control recommended given known coronary artery disease.    -Obesity: BMI 32.39.  Routine exercise and low-sodium, cardiac diet recommended.        I attest that all portions of this note have been reviewed and updated to reflect the patient's  current status.

## 2022-06-19 PROBLEM — K22.70 BARRETT'S ESOPHAGUS WITHOUT DYSPLASIA: Status: ACTIVE | Noted: 2022-06-19

## 2022-06-19 PROBLEM — K21.00 GASTROESOPHAGEAL REFLUX DISEASE WITH ESOPHAGITIS: Status: ACTIVE | Noted: 2022-03-09

## 2022-07-27 ENCOUNTER — OFFICE VISIT (OUTPATIENT)
Dept: GASTROENTEROLOGY | Facility: CLINIC | Age: 72
End: 2022-07-27

## 2022-07-27 ENCOUNTER — LAB (OUTPATIENT)
Dept: LAB | Facility: HOSPITAL | Age: 72
End: 2022-07-27

## 2022-07-27 VITALS
HEART RATE: 60 BPM | BODY MASS INDEX: 30.35 KG/M2 | SYSTOLIC BLOOD PRESSURE: 140 MMHG | HEIGHT: 70 IN | OXYGEN SATURATION: 98 % | DIASTOLIC BLOOD PRESSURE: 72 MMHG | TEMPERATURE: 96.9 F | WEIGHT: 212 LBS

## 2022-07-27 DIAGNOSIS — K52.9 NONSPECIFIC COLITIS: Primary | ICD-10-CM

## 2022-07-27 DIAGNOSIS — K21.00 GASTROESOPHAGEAL REFLUX DISEASE WITH ESOPHAGITIS WITHOUT HEMORRHAGE: ICD-10-CM

## 2022-07-27 DIAGNOSIS — D50.9 IRON DEFICIENCY ANEMIA, UNSPECIFIED IRON DEFICIENCY ANEMIA TYPE: Chronic | ICD-10-CM

## 2022-07-27 DIAGNOSIS — Z86.010 HX OF COLONIC POLYPS: ICD-10-CM

## 2022-07-27 DIAGNOSIS — D50.9 IRON DEFICIENCY ANEMIA, UNSPECIFIED IRON DEFICIENCY ANEMIA TYPE: ICD-10-CM

## 2022-07-27 DIAGNOSIS — K22.70 BARRETT'S ESOPHAGUS WITHOUT DYSPLASIA: ICD-10-CM

## 2022-07-27 LAB
BASOPHILS # BLD AUTO: 0.05 10*3/MM3 (ref 0–0.2)
BASOPHILS NFR BLD AUTO: 0.6 % (ref 0–1.5)
DEPRECATED RDW RBC AUTO: 46.8 FL (ref 37–54)
EOSINOPHIL # BLD AUTO: 0.08 10*3/MM3 (ref 0–0.4)
EOSINOPHIL NFR BLD AUTO: 1 % (ref 0.3–6.2)
ERYTHROCYTE [DISTWIDTH] IN BLOOD BY AUTOMATED COUNT: 15.7 % (ref 12.3–15.4)
FERRITIN SERPL-MCNC: 31.21 NG/ML (ref 30–400)
HCT VFR BLD AUTO: 42.6 % (ref 37.5–51)
HGB BLD-MCNC: 13.3 G/DL (ref 13–17.7)
IMM GRANULOCYTES # BLD AUTO: 0.1 10*3/MM3 (ref 0–0.05)
IMM GRANULOCYTES NFR BLD AUTO: 1.2 % (ref 0–0.5)
LYMPHOCYTES # BLD AUTO: 0.89 10*3/MM3 (ref 0.7–3.1)
LYMPHOCYTES NFR BLD AUTO: 10.6 % (ref 19.6–45.3)
MCH RBC QN AUTO: 26.1 PG (ref 26.6–33)
MCHC RBC AUTO-ENTMCNC: 31.2 G/DL (ref 31.5–35.7)
MCV RBC AUTO: 83.7 FL (ref 79–97)
MONOCYTES # BLD AUTO: 0.7 10*3/MM3 (ref 0.1–0.9)
MONOCYTES NFR BLD AUTO: 8.4 % (ref 5–12)
NEUTROPHILS NFR BLD AUTO: 6.54 10*3/MM3 (ref 1.7–7)
NEUTROPHILS NFR BLD AUTO: 78.2 % (ref 42.7–76)
NRBC BLD AUTO-RTO: 0 /100 WBC (ref 0–0.2)
PLATELET # BLD AUTO: 197 10*3/MM3 (ref 140–450)
PMV BLD AUTO: 9.6 FL (ref 6–12)
RBC # BLD AUTO: 5.09 10*6/MM3 (ref 4.14–5.8)
WBC NRBC COR # BLD: 8.36 10*3/MM3 (ref 3.4–10.8)

## 2022-07-27 PROCEDURE — 82728 ASSAY OF FERRITIN: CPT

## 2022-07-27 PROCEDURE — 99214 OFFICE O/P EST MOD 30 MIN: CPT | Performed by: INTERNAL MEDICINE

## 2022-07-27 PROCEDURE — 85025 COMPLETE CBC W/AUTO DIFF WBC: CPT

## 2022-07-27 PROCEDURE — 36415 COLL VENOUS BLD VENIPUNCTURE: CPT

## 2022-07-27 NOTE — PROGRESS NOTES
Primary Physician: CASE Puetnes MD      Chief Complaint   Patient presents with   • Follow-up     Pt presents today for colitis and james's follow up-had endo/colon 4/6/2022; Pt states overall he is feeling good        Subjective     Aren Beltrán is a 72 y.o. male.      HPI   Nonspecific colonic inflammation-new problem to address today  Colonoscopy to assess iron deficiency anemia 2/2022 showed nonspecific inflammation from the transverse colon distally confirmed on histology as well.  There were multiple inflammatory pseudopolyps as well.  No adenomas. He denies any diarrhea or chronic diarrhea in the past.  He is now eating clean.  No prior h/o IBD.    James's esophagus  4 cm segment of James's esophagus found endoscopically 4/2022.  No dysplasia on biopsies.  On pantoprazole 40 mg daily.  He now knows to take in the am before breakfast.    GERD with esophagitis  Endoscopy 4/2022 showed LA grade B esophagitis associated with small hiatal hernia.  Patient on pantoprazole 40 mg daily.    Iron deficiency anemia  Ferritin was 20 with a hemoglobin 12 in 2/2022.  Endo a small bowel biopsies negative 2/2022.  Colonoscopy same day showed inflammation from the transverse colon distally confirmed on pathology.  No mass or adenoma noted.  He is on OTC iron daily.    History of colon polyps  Last colonoscopy was negative for adenomas 4/2022.  Repeat colonoscopy 4/2027.      Past Medical History:   Diagnosis Date   • History of colon polyps    • Hypertriglyceridemia 01/02/2021   • Type 2 diabetes mellitus (HCC)        Past Surgical History:   Procedure Laterality Date   • CARDIAC CATHETERIZATION N/A 01/01/2021    Procedure: Left Heart Cath;  Surgeon: Marko Fitch MD;  Location: [facility] CATH INVASIVE LOCATION;  Service: Cardiovascular;  Laterality: N/A;   • CARDIAC CATHETERIZATION N/A 01/01/2021    Procedure: Percutaneous Coronary Intervention;  Surgeon: Marko Fitch MD;  Location: [facility]  CATH INVASIVE LOCATION;  Service: Cardiovascular;  Laterality: N/A;   • COLONOSCOPY N/A 04/06/2022    The examined portion of the ileum was normal; Colitis-Inflammation was found in the transverse colon-This was mild in severity-biopsied; Four 5-6mm polyps in the sigmoid colon-path shows polypoid fragments of benign colonic mucosa focally demonstrating mild active inflammation, nonspecific; Repeat colonoscopy (date not yet determined) to check healing   • ENDOSCOPY N/A 04/06/2022    LA Grade B reflux esophagitis; Esophageal mucosal changes suggestive of long-segment Guzman's esophagus-biopsied; Medium-sized HH; Non-bleeding erosive gastropathy-biopsied; Erosive gastritis; Normal examined duodenum-biopsied; Repeat 3 years        Current Outpatient Medications:   •  Ascorbic Acid (VITAMIN C ADULT GUMMIES PO), Take 1 tablet by mouth Daily., Disp: , Rfl:   •  aspirin 81 MG EC tablet, Take 1 tablet by mouth Daily., Disp: 30 tablet, Rfl: 11  •  atorvastatin (LIPITOR) 20 MG tablet, Take 1 tablet by mouth every night at bedtime., Disp: 90 tablet, Rfl: 1  •  coenzyme Q10 100 MG capsule, Take 100 mg by mouth Daily., Disp: , Rfl:   •  Cyanocobalamin (VITAMIN B12 PO), Take 1 tablet by mouth Daily., Disp: , Rfl:   •  Ferrous Sulfate (IRON PO), Take  by mouth Daily., Disp: , Rfl:   •  glucose blood (Contour Next Test) test strip, Use as instructed, Disp: 200 each, Rfl: 0  •  insulin detemir (Levemir) 100 UNIT/ML injection, Inject 20 Units under the skin into the appropriate area as directed Daily., Disp: , Rfl:   •  insulin lispro (HumaLOG) 100 UNIT/ML injection, Inject 5 Units under the skin into the appropriate area as directed 3 (Three) Times a Day Before Meals., Disp: 45 mL, Rfl: 5  •  lisinopril (PRINIVIL,ZESTRIL) 2.5 MG tablet, Take 1 tablet by mouth Daily., Disp: 30 tablet, Rfl: 5  •  metFORMIN (GLUCOPHAGE) 500 MG tablet, TAKE 1 TABLET BY MOUTH TWICE A DAY WITH MEALS, Disp: 180 tablet, Rfl: 1  •  metoprolol tartrate  "(LOPRESSOR) 25 MG tablet, Take 1 tablet by mouth Every 12 (Twelve) Hours., Disp: 180 tablet, Rfl: 1  •  Microlet Lancets misc, USE TO CHECK BLOOD SUGAR 4 TIMES A DAY BEFORE MEALS AND AT BEDTIME *INS ONLY PAY FOR 30 DAY SUPPLY*, Disp: 200 each, Rfl: 11  •  nitroglycerin (NITROSTAT) 0.4 MG SL tablet, Place 1 tablet under the tongue Every 5 (Five) Minutes As Needed for Chest Pain. Take no more than 3 doses in 15 minutes., Disp: 25 tablet, Rfl: 2  •  pantoprazole (PROTONIX) 40 MG EC tablet, Take 1 tablet by mouth Daily., Disp: 30 tablet, Rfl: 11    Allergies   Allergen Reactions   • Ozempic (0.25 Or 0.5 Mg-Dose) [Semaglutide(0.25 Or 0.5mg-Dos)] Diarrhea       Social History     Socioeconomic History   • Marital status:    Tobacco Use   • Smoking status: Former Smoker   • Smokeless tobacco: Never Used   Vaping Use   • Vaping Use: Never used   Substance and Sexual Activity   • Alcohol use: Not Currently   • Drug use: Never   • Sexual activity: Defer       Family History   Problem Relation Age of Onset   • Coronary artery disease Mother    • Coronary artery disease Father    • Heart disease Brother    • Heart disease Brother    • Colon cancer Neg Hx    • Colon polyps Neg Hx    • Esophageal cancer Neg Hx    • Liver cancer Neg Hx    • Liver disease Neg Hx    • Rectal cancer Neg Hx    • Stomach cancer Neg Hx        Review of Systems   Respiratory: Negative for shortness of breath.    Cardiovascular: Negative for chest pain.       Objective     /72 (BP Location: Left arm, Patient Position: Sitting, Cuff Size: Adult)   Pulse 60   Temp 96.9 °F (36.1 °C) (Infrared)   Ht 177.2 cm (69.75\")   Wt 96.2 kg (212 lb)   SpO2 98%   BMI 30.64 kg/m²     Physical Exam  Constitutional:       Appearance: He is well-developed.   Pulmonary:      Effort: Pulmonary effort is normal.   Musculoskeletal:         General: Normal range of motion.   Skin:     General: Skin is warm.   Neurological:      Mental Status: He is alert and " oriented to person, place, and time.   Psychiatric:         Behavior: Behavior normal.         Lab Results - Last 18 Months   Lab Units 02/25/22  1057   GLUCOSE mg/dL 88   BUN mg/dL 23   CREATININE mg/dL 1.18   SODIUM mmol/L 139   POTASSIUM mmol/L 5.1   CHLORIDE mmol/L 102   CO2 mmol/L 23.2   TOTAL PROTEIN g/dL 7.2   ALBUMIN g/dL 4.70   ALT (SGPT) U/L 6   AST (SGOT) U/L 13   ALK PHOS U/L 39   BILIRUBIN mg/dL 0.2   GLOBULIN gm/dL 2.5       Lab Results - Last 18 Months   Lab Units 02/25/22  1057   HEMOGLOBIN g/dL 12.3*   HEMATOCRIT % 41.0   MCV fL 76.6*   WBC 10*3/mm3 9.72   RDW % 18.7*   MPV fL 9.3   PLATELETS 10*3/mm3 238       Lab Results - Last 18 Months   Lab Units 02/25/22  1057   IRON mcg/dL 36*   TIBC mcg/dL 550*   IRON SATURATION % 7*   FERRITIN ng/mL 20.90*   TSH uIU/mL 4.670*          Endo and colon biopsy results from 4/2022:  Final Diagnosis   1.  Small intestine, duodenum, endoscopic biopsy: Fragments of benign duodenal mucosa.    Comment: Histologic changes of celiac sprue are not identified.    2.  Esophagus at 35 cm, endoscopic biopsy:  A.  Fragments of benign glandular mucosa demonstrating intestinal metaplasia (Guzman's esophagus).  B.  Chronic active inflammation, moderate.  C.  No dysplasia identified.    3.  Esophagus at 33 cm, endoscopic biopsy:  A.  Fragments of benign glandular mucosa demonstrating intestinal metaplasia (Guzman's esophagus).  B.  Chronic active inflammation, moderate.  C.  No dysplasia identified.    4.  Mid esophagus, endoscopic biopsy:  A.  Fragments of benign squamocolumnar junction mucosa and benign glandular mucosa demonstrating intestinal metaplasia (Guzman's esophagus).  B.  Chronic active inflammation, mild.  C.  No dysplasia identified.    5.  Large intestine, right colon, biopsy:  A.  Fragments of benign colonic mucosa.  B.  No active colitis identified.  C.  No dysplasia identified.    6.  Large intestine, transverse colon, biopsy:  A.  Fragments of benign  colonic mucosa demonstrating moderate active inflammation, nonspecific.  B.  No dysplasia identified.    7.  Large intestine, descending colon, biopsy:  A.  Fragments of benign colonic mucosa.  B.  No active colitis identified.  C.  No dysplasia identified.    8.  Large intestine, sigmoid colon, biopsy:  A.  Fragments of benign colonic mucosa demonstrating mild active inflammation, nonspecific.  B.  No dysplasia identified.    9.  Large intestine, sigmoid polypoid lesions x4:  A.  Polypoid fragments of benign colonic mucosa focally demonstrating mild active inflammation, nonspecific.  B.  No dysplasia identified.    10.  Large intestine, rectum, biopsy:  A.  Fragments of benign large intestinal mucosa.  B.  No active proctitis identified.  C.  No dysplasia identified.   Electronically signed by Candice Craig MD on 4/9/2022 at 1903         IMPRESSION/PLAN:    Assessment & Plan      Problem List Items Addressed This Visit        Gastrointestinal Abdominal     Hx of colonic polyps    Overview     Last colonoscopy was negative for adenomas 4/2022.  Repeat colonoscopy 4/2027.         Guzman's esophagus without dysplasia    Overview     4 cm segment of Guzman's esophagus found endoscopically 4/2022.  Will need repeat colonoscopy 4/2025.         Nonspecific colitis - Primary    Overview     Colonoscopy to assess iron deficiency anemia 2/2022 showed nonspecific inflammation from the transverse colon distally confirmed on histology as well.  There were multiple inflammatory pseudopolyps as well.  No adenomas.    He is doing well.  There is no diarrhea.  No prior history of IBD.  Patient is aware of endoscopic findings and should he develop symptoms, to let my office know.  At this point, I am not going to initiate any therapy because he is asymptomatic.            Hematology and Neoplasia    Iron deficiency anemia (Chronic)    Overview     Endoscopy 4/2022 showed LA grade B esophagitis with Guzman's esophagus.  He  also had nonspecific inflammation seen in the transverse and sigmoid colon on colonoscopy on the same date.         Relevant Orders    CBC & Differential    Ferritin       Other    Gastroesophageal reflux disease with esophagitis without hemorrhage    Overview     Grade B esophagitis seen on endoscopy 4/2022.  Patient also has 4 cm segment of Guzman's esophagus.  He is treated with active medical therapy of PPI.    Anti-reflux measures were reviewed and discussed with patient.  They were advised to refrain from chocolate, alcohol, smoking, peppermint and caffeine.  They were advised to limit fatty foods, large meals, and eating late at nighttime.  They were advised to reach an ideal body mass index.               MEDICAL COMPLEXITY must have 2 out of 3   Moderate Complexity Level 4                                                                                                              1 of the following medical problems:                                                                                               []One chronic illness with mild exacerbation                                                                                [x]Two or more stable chronic illness                                                                                              [x]One new problem  []One acute illness with systemic symptoms     Complexity of Data  Reviewed (1 out of the 3 following categories)                                             Category 1 tests, documents, historian (must have 3 points)                                                      []Review of prior external records  []Review of results of unique tests  [x]Ordering unique tests   []Assessment requires an independent historian   Category 2 Interpretation of tests     []Independent interpretation of test read by another doc   Category 3 Discuss Management/tests  []Discussion with external physician     Risk of complications and/or morbidity                                                                                            [x]Prescription Drug Management     []Decision for elective endoscopic procedure                RTC 6 months      Laury Rodriguez MD  07/27/22  15:07 CDT    Much of this encounter note is an electronic transcription/translation of spoken language to printed text. The electronic translation of spoken language may permit erroneous, or at times, nonsensical words or phrases to be inadvertently transcribed; although I have reviewed the note for such errors, some may still exist.

## 2022-08-06 NOTE — DISCHARGE SUMMARY
[facility] HEART GROUP DISCHARGE    Date of Discharge:  1/3/2021    Discharge Diagnosis:   STEMI 2' RCA Occlusion  DM, Type 2  Essential HTN  Chronic Diastolic CHF  Hypertriglyceridemia  Obesity with BMI 29.09    Presenting Problem/History of Present Illness  ST elevation myocardial infarction (STEMI) involving other coronary artery of inferior wall (CMS/HCC) [I21.19]        Hospital Course  Mr. Beltrán is a 70 y.o. male presented to the ER with acute onset of substernal chest pain with associated diaphoresis and nausea.       Work-up in the ER included EKG that demonstrated inferior ST elevations. He was taken emergently to the cardiac catheterization laboratory where he was found to have occlusion of distal RCA extending to proximal PDA. The vessel was successfully opened with deployment of STEFFI. He tolerated the procedure without complication and was admitted to the CCU.     The following morning, he was hemodynamically stable and felt stable for transfer to  for ongoing monitoring. On day of discharge patient complained of shortness of breath, felt related to Brilinta. He was ambulated, oxygenating well on RA. He was hemodynamically stable and felt safe for discharge home.      Procedures Performed  1. On 01/01/2021, Cardiac Catheterization with PCI/STEFFI to RCA by Dr. Marko Fitch.       Consults:   1. Dr. Romero Valdovinos, Hospitalist    Pertinent Test Results:     2D Echocardiogram:  · Left ventricular systolic function is normal. Left ventricular ejection fraction appears to be 56-60%.  · The following left ventricular wall segments are hypokinetic: basal inferolateral and mid inferolateral.  · Left ventricular wall thickness is consistent with mild concentric hypertrophy.  · Left ventricular diastolic dysfunction is noted.  · There is mild mitral valve prolapse of the anterior mitral leaflet.  · No hemodynamically significant valvular abnormalities identified on this study.      Condition on  Discharge:  Stable    Physical Exam at Discharge    Vital Signs  Temp:  [97.9 °F (36.6 °C)-98.6 °F (37 °C)] 98.6 °F (37 °C)  Heart Rate:  [59-71] 59  Resp:  [16-18] 16  BP: (102-126)/(69-96) 102/69    Physical Exam:     General Appearance:    Alert, cooperative, in no acute distress   HEENT:    Normocephalic. Atraumatic. MARCE.   Lungs:     Clear to auscultation, respirations regular, even and unlabored    Heart:    Regular rhythm and normal rate, normal S1 and S2, no murmur, no gallop, no rub, no click   Abdomen:     Normal bowel sounds, soft, non-tender    Extremities:   Moves all extremities, no edema, no cyanosis, no redness   Pulses:   Pulses palpable and equal bilaterally   Skin:   No bleeding, bruising or rash   Neurologic:   Grossly intact          Discharge Disposition  Home or Self CareHome    Discharge Medications     Discharge Medications      New Medications      Instructions Start Date   accu-chek soft touch lancets   Accu-checks QID, AC and HS      aspirin 81 MG EC tablet   81 mg, Oral, Daily   Start Date: January 4, 2021     atorvastatin 40 MG tablet  Commonly known as: LIPITOR   40 mg, Oral, Nightly      Contour Next Test test strip  Generic drug: glucose blood   Use as instructed      insulin detemir 100 UNIT/ML injection  Commonly known as: LEVEMIR   30 Units, Subcutaneous, Nightly      insulin lispro 100 UNIT/ML injection  Commonly known as: humaLOG, ADMELOG   1-5 Units, Subcutaneous, 3 Times Daily Before Meals      insulin lispro 100 UNIT/ML injection  Commonly known as: humaLOG, ADMELOG   7 Units, Subcutaneous, 3 Times Daily With Meals      lisinopril 5 MG tablet  Commonly known as: PRINIVIL,ZESTRIL   5 mg, Oral, Every 24 Hours Scheduled   Start Date: January 4, 2021     metFORMIN 500 MG tablet  Commonly known as: GLUCOPHAGE   500 mg, Oral, 2 Times Daily With Meals   Start Date: January 4, 2021     metoprolol tartrate 25 MG tablet  Commonly known as: LOPRESSOR   25 mg, Oral, Every 12 Hours  Scheduled      nitroglycerin 0.4 MG SL tablet  Commonly known as: NITROSTAT   0.4 mg, Sublingual, Every 5 Minutes PRN, Take no more than 3 doses in 15 minutes.      ticagrelor 90 MG tablet tablet  Commonly known as: BRILINTA   90 mg, Oral, Every 12 Hours Scheduled           Diabetic medications at discharge per hospitalist recommendations.       Discharge Diet: Cardiac, ADA    Activity at Discharge: No strenuous activity until by cardiology.     Follow-up Appointments  1. Follow-up with PCP in 3 to 5 days.  2. Cardiology Follow-up with JET Amaya in 2 weeks.       Additional Instructions for the Follow-ups that You Need to Schedule     Ambulatory Referral to Cardiac Rehab   As directed            Discharge Time/Planning - 25 minutes     SACHIN Cabezas  01/03/21  12:06 CST             show

## 2022-08-26 ENCOUNTER — OFFICE VISIT (OUTPATIENT)
Dept: INTERNAL MEDICINE | Facility: CLINIC | Age: 72
End: 2022-08-26

## 2022-08-26 ENCOUNTER — LAB (OUTPATIENT)
Dept: LAB | Facility: HOSPITAL | Age: 72
End: 2022-08-26

## 2022-08-26 VITALS
SYSTOLIC BLOOD PRESSURE: 128 MMHG | OXYGEN SATURATION: 97 % | WEIGHT: 215 LBS | HEIGHT: 69 IN | DIASTOLIC BLOOD PRESSURE: 86 MMHG | HEART RATE: 54 BPM | BODY MASS INDEX: 31.84 KG/M2

## 2022-08-26 DIAGNOSIS — E78.2 MIXED HYPERLIPIDEMIA: ICD-10-CM

## 2022-08-26 DIAGNOSIS — R79.89 ELEVATED TSH: ICD-10-CM

## 2022-08-26 DIAGNOSIS — E11.65 TYPE 2 DIABETES MELLITUS WITH HYPERGLYCEMIA, WITHOUT LONG-TERM CURRENT USE OF INSULIN: Primary | ICD-10-CM

## 2022-08-26 DIAGNOSIS — E66.2 CLASS 1 OBESITY WITH ALVEOLAR HYPOVENTILATION, SERIOUS COMORBIDITY, AND BODY MASS INDEX (BMI) OF 31.0 TO 31.9 IN ADULT: ICD-10-CM

## 2022-08-26 DIAGNOSIS — E66.2 CLASS 1 OBESITY WITH ALVEOLAR HYPOVENTILATION, SERIOUS COMORBIDITY, AND BODY MASS INDEX (BMI) OF 32.0 TO 32.9 IN ADULT: ICD-10-CM

## 2022-08-26 DIAGNOSIS — I25.118 CORONARY ARTERY DISEASE OF NATIVE ARTERY OF NATIVE HEART WITH STABLE ANGINA PECTORIS: Chronic | ICD-10-CM

## 2022-08-26 DIAGNOSIS — E11.65 TYPE 2 DIABETES MELLITUS WITH HYPERGLYCEMIA, WITHOUT LONG-TERM CURRENT USE OF INSULIN: ICD-10-CM

## 2022-08-26 LAB
ALBUMIN SERPL-MCNC: 4.7 G/DL (ref 3.5–5.2)
ALBUMIN/GLOB SERPL: 1.8 G/DL
ALP SERPL-CCNC: 36 U/L (ref 39–117)
ALT SERPL W P-5'-P-CCNC: 7 U/L (ref 1–41)
ANION GAP SERPL CALCULATED.3IONS-SCNC: 12 MMOL/L (ref 5–15)
AST SERPL-CCNC: 22 U/L (ref 1–40)
BILIRUB SERPL-MCNC: 0.4 MG/DL (ref 0–1.2)
BUN SERPL-MCNC: 19 MG/DL (ref 8–23)
BUN/CREAT SERPL: 15.7 (ref 7–25)
CALCIUM SPEC-SCNC: 9.7 MG/DL (ref 8.6–10.5)
CHLORIDE SERPL-SCNC: 99 MMOL/L (ref 98–107)
CO2 SERPL-SCNC: 25 MMOL/L (ref 22–29)
CREAT SERPL-MCNC: 1.21 MG/DL (ref 0.76–1.27)
EGFRCR SERPLBLD CKD-EPI 2021: 64 ML/MIN/1.73
GLOBULIN UR ELPH-MCNC: 2.6 GM/DL
GLUCOSE SERPL-MCNC: 80 MG/DL (ref 65–99)
HBA1C MFR BLD: 6.1 % (ref 4.8–5.6)
POTASSIUM SERPL-SCNC: 4.6 MMOL/L (ref 3.5–5.2)
PROT SERPL-MCNC: 7.3 G/DL (ref 6–8.5)
SODIUM SERPL-SCNC: 136 MMOL/L (ref 136–145)
TSH SERPL DL<=0.05 MIU/L-ACNC: 5.2 UIU/ML (ref 0.27–4.2)

## 2022-08-26 PROCEDURE — 80053 COMPREHEN METABOLIC PANEL: CPT

## 2022-08-26 PROCEDURE — 84439 ASSAY OF FREE THYROXINE: CPT

## 2022-08-26 PROCEDURE — 84443 ASSAY THYROID STIM HORMONE: CPT

## 2022-08-26 PROCEDURE — 99214 OFFICE O/P EST MOD 30 MIN: CPT | Performed by: NURSE PRACTITIONER

## 2022-08-26 PROCEDURE — 83036 HEMOGLOBIN GLYCOSYLATED A1C: CPT

## 2022-08-26 PROCEDURE — 36415 COLL VENOUS BLD VENIPUNCTURE: CPT

## 2022-08-26 RX ORDER — LISINOPRIL 2.5 MG/1
2.5 TABLET ORAL DAILY
Qty: 30 TABLET | Refills: 5 | Status: SHIPPED | OUTPATIENT
Start: 2022-08-26 | End: 2023-01-18 | Stop reason: SDUPTHER

## 2022-08-26 NOTE — PROGRESS NOTES
Chief Complaint   Patient presents with   • Diabetes         History:  Aren Beltrán is a 72 y.o. male who presents today for evaluation of the above problems.          6 month follow up for diabetes, hyperlipidemia.  Has really made some lifestyle changes in diet. Cooking healthy.  Despite this, weight is up a little. Not really exercising.  Has moved to [place] and may change providers to closer, but will let us know.      TSH mildly elevated with normal T4 when checked 6 months ago.    Continues to follow with GI for some bleeding and anemia.    Continues to follow with cardiology for CAD, CHF.      ROS:  Review of Systems  As above    Allergies   Allergen Reactions   • Ozempic (0.25 Or 0.5 Mg-Dose) [Semaglutide(0.25 Or 0.5mg-Dos)] Diarrhea     Past Medical History:   Diagnosis Date   • History of colon polyps    • Hypertriglyceridemia 01/02/2021   • Type 2 diabetes mellitus (HCC)      Past Surgical History:   Procedure Laterality Date   • CARDIAC CATHETERIZATION N/A 01/01/2021    Procedure: Left Heart Cath;  Surgeon: Marko Fitch MD;  Location:  [facility] CATH INVASIVE LOCATION;  Service: Cardiovascular;  Laterality: N/A;   • CARDIAC CATHETERIZATION N/A 01/01/2021    Procedure: Percutaneous Coronary Intervention;  Surgeon: Marko Fitch MD;  Location:  [facility] CATH INVASIVE LOCATION;  Service: Cardiovascular;  Laterality: N/A;   • COLONOSCOPY N/A 04/06/2022    The examined portion of the ileum was normal; Colitis-Inflammation was found in the transverse colon-This was mild in severity-biopsied; Four 5-6mm polyps in the sigmoid colon-path shows polypoid fragments of benign colonic mucosa focally demonstrating mild active inflammation, nonspecific; Repeat colonoscopy (date not yet determined) to check healing   • ENDOSCOPY N/A 04/06/2022    LA Grade B reflux esophagitis; Esophageal mucosal changes suggestive of long-segment Guzman's esophagus-biopsied; Medium-sized HH; Non-bleeding erosive  gastropathy-biopsied; Erosive gastritis; Normal examined duodenum-biopsied; Repeat 3 years     Family History   Problem Relation Age of Onset   • Coronary artery disease Mother    • Coronary artery disease Father    • Heart disease Brother    • Heart disease Brother    • Colon cancer Neg Hx    • Colon polyps Neg Hx    • Esophageal cancer Neg Hx    • Liver cancer Neg Hx    • Liver disease Neg Hx    • Rectal cancer Neg Hx    • Stomach cancer Neg Hx      Aren  reports that he has quit smoking. He has never used smokeless tobacco. He reports previous alcohol use. He reports that he does not use drugs.    I have reviewed and updated the above documentation (if necessary) including but not limited to chief complaint, ROS, PFSH, allergies and medications        Current Outpatient Medications:   •  Ascorbic Acid (VITAMIN C ADULT GUMMIES PO), Take 1 tablet by mouth Daily., Disp: , Rfl:   •  aspirin 81 MG EC tablet, Take 1 tablet by mouth Daily., Disp: 30 tablet, Rfl: 11  •  atorvastatin (LIPITOR) 20 MG tablet, Take 1 tablet by mouth every night at bedtime., Disp: 90 tablet, Rfl: 1  •  coenzyme Q10 100 MG capsule, Take 100 mg by mouth Daily., Disp: , Rfl:   •  Cyanocobalamin (VITAMIN B12 PO), Take 1 tablet by mouth Daily., Disp: , Rfl:   •  Ferrous Sulfate (IRON PO), Take  by mouth Daily., Disp: , Rfl:   •  glucose blood (Contour Next Test) test strip, Use as instructed, Disp: 200 each, Rfl: 0  •  insulin detemir (Levemir) 100 UNIT/ML injection, Inject 20 Units under the skin into the appropriate area as directed Daily., Disp: , Rfl:   •  insulin lispro (HumaLOG) 100 UNIT/ML injection, Inject 5 Units under the skin into the appropriate area as directed 3 (Three) Times a Day Before Meals., Disp: 45 mL, Rfl: 5  •  lisinopril (PRINIVIL,ZESTRIL) 2.5 MG tablet, Take 1 tablet by mouth Daily., Disp: 30 tablet, Rfl: 5  •  metFORMIN (GLUCOPHAGE) 500 MG tablet, TAKE 1 TABLET BY MOUTH TWICE A DAY WITH MEALS, Disp: 180 tablet, Rfl: 1  •   "metoprolol tartrate (LOPRESSOR) 25 MG tablet, Take 1 tablet by mouth Every 12 (Twelve) Hours., Disp: 180 tablet, Rfl: 1  •  Microlet Lancets misc, USE TO CHECK BLOOD SUGAR 4 TIMES A DAY BEFORE MEALS AND AT BEDTIME *INS ONLY PAY FOR 30 DAY SUPPLY*, Disp: 200 each, Rfl: 11  •  nitroglycerin (NITROSTAT) 0.4 MG SL tablet, Place 1 tablet under the tongue Every 5 (Five) Minutes As Needed for Chest Pain. Take no more than 3 doses in 15 minutes., Disp: 25 tablet, Rfl: 2  •  pantoprazole (PROTONIX) 40 MG EC tablet, Take 1 tablet by mouth Daily., Disp: 30 tablet, Rfl: 11    OBJECTIVE:  Visit Vitals  /86 (BP Location: Right arm, Patient Position: Sitting, Cuff Size: Adult)   Pulse 54   Ht 175.3 cm (69\")   Wt 97.5 kg (215 lb)   SpO2 97%   BMI 31.75 kg/m²      Physical Exam  Vitals and nursing note reviewed.   Constitutional:       General: He is not in acute distress.     Appearance: Normal appearance. He is not ill-appearing, toxic-appearing or diaphoretic.   HENT:      Head: Normocephalic and atraumatic.   Neck:      Vascular: No carotid bruit.      Comments: No thyromegaly or mass  Cardiovascular:      Rate and Rhythm: Normal rate and regular rhythm.      Heart sounds: Normal heart sounds.   Pulmonary:      Effort: Pulmonary effort is normal. No respiratory distress.      Breath sounds: Normal breath sounds. No wheezing.   Abdominal:      Palpations: Abdomen is soft.   Musculoskeletal:      Right lower leg: No edema.      Left lower leg: No edema.   Skin:     General: Skin is warm and dry.   Neurological:      Mental Status: He is alert and oriented to person, place, and time.   Psychiatric:         Mood and Affect: Mood normal.         Behavior: Behavior normal.         Thought Content: Thought content normal.         Judgment: Judgment normal.         [facility]    Assessment/Plan    Diagnoses and all orders for this visit:    1. Type 2 diabetes mellitus with hyperglycemia, without long-term current use of insulin (HCC) " (Primary)  -     Comprehensive metabolic panel; Future  -     Hemoglobin A1c; Future    2. Mixed hyperlipidemia  -     Comprehensive metabolic panel; Future  -     TSH; Future    3. Class 1 obesity with alveolar hypoventilation, serious comorbidity, and body mass index (BMI) of 31.0 to 31.9 in adult (HCC)    4. Coronary artery disease of native artery of native heart with stable angina pectoris (HCC)  -     lisinopril (PRINIVIL,ZESTRIL) 2.5 MG tablet; Take 1 tablet by mouth Daily.  Dispense: 30 tablet; Refill: 5    5. Elevated TSH  -     TSH; Future    Labs and refill as above.    BMI is >= 30 and <35. (Class 1 Obesity). The following options were offered after discussion;: exercise counseling/recommendations and nutrition counseling/recommendations      Education materials and an After Visit Summary were printed and given to the patient at discharge.  Return in about 4 months (around 12/26/2022) for Annual physical with Dr. Puentes.         SACHIN Paulino   10:02 CDT  8/26/2022

## 2022-08-29 DIAGNOSIS — E66.2 CLASS 1 OBESITY WITH ALVEOLAR HYPOVENTILATION, SERIOUS COMORBIDITY, AND BODY MASS INDEX (BMI) OF 32.0 TO 32.9 IN ADULT: ICD-10-CM

## 2022-08-29 DIAGNOSIS — E11.65 TYPE 2 DIABETES MELLITUS WITH HYPERGLYCEMIA, WITHOUT LONG-TERM CURRENT USE OF INSULIN: ICD-10-CM

## 2022-08-29 DIAGNOSIS — R79.89 ELEVATED TSH: Primary | ICD-10-CM

## 2022-08-29 LAB — T4 FREE SERPL-MCNC: 1.14 NG/DL (ref 0.93–1.7)

## 2022-09-01 DIAGNOSIS — I25.118 CORONARY ARTERY DISEASE OF NATIVE ARTERY OF NATIVE HEART WITH STABLE ANGINA PECTORIS: Chronic | ICD-10-CM

## 2022-09-01 RX ORDER — ATORVASTATIN CALCIUM 20 MG/1
TABLET, FILM COATED ORAL
Qty: 90 TABLET | Refills: 1 | Status: SHIPPED | OUTPATIENT
Start: 2022-09-01 | End: 2023-03-06

## 2022-11-08 DIAGNOSIS — E11.65 TYPE 2 DIABETES MELLITUS WITH HYPERGLYCEMIA: ICD-10-CM

## 2022-11-08 RX ORDER — INSULIN DETEMIR 100 [IU]/ML
30 INJECTION, SOLUTION SUBCUTANEOUS NIGHTLY
Qty: 10 ML | Refills: 5 | Status: SHIPPED | OUTPATIENT
Start: 2022-11-08

## 2022-12-08 ENCOUNTER — OFFICE VISIT (OUTPATIENT)
Dept: CARDIOLOGY | Facility: CLINIC | Age: 72
End: 2022-12-08

## 2022-12-08 VITALS
HEART RATE: 57 BPM | HEIGHT: 69 IN | DIASTOLIC BLOOD PRESSURE: 80 MMHG | BODY MASS INDEX: 31.55 KG/M2 | OXYGEN SATURATION: 99 % | WEIGHT: 213 LBS | SYSTOLIC BLOOD PRESSURE: 126 MMHG

## 2022-12-08 DIAGNOSIS — E78.2 MIXED HYPERLIPIDEMIA: Chronic | ICD-10-CM

## 2022-12-08 DIAGNOSIS — E11.65 TYPE 2 DIABETES MELLITUS WITH HYPERGLYCEMIA, WITHOUT LONG-TERM CURRENT USE OF INSULIN: Chronic | ICD-10-CM

## 2022-12-08 DIAGNOSIS — I25.118 CORONARY ARTERY DISEASE OF NATIVE ARTERY OF NATIVE HEART WITH STABLE ANGINA PECTORIS: Primary | Chronic | ICD-10-CM

## 2022-12-08 DIAGNOSIS — E66.2 CLASS 1 OBESITY WITH ALVEOLAR HYPOVENTILATION, SERIOUS COMORBIDITY, AND BODY MASS INDEX (BMI) OF 31.0 TO 31.9 IN ADULT: Chronic | ICD-10-CM

## 2022-12-08 PROCEDURE — 93000 ELECTROCARDIOGRAM COMPLETE: CPT | Performed by: NURSE PRACTITIONER

## 2022-12-08 PROCEDURE — 99214 OFFICE O/P EST MOD 30 MIN: CPT | Performed by: NURSE PRACTITIONER

## 2022-12-08 RX ORDER — RANOLAZINE 500 MG/1
500 TABLET, EXTENDED RELEASE ORAL 2 TIMES DAILY
Qty: 60 TABLET | Refills: 11 | Status: SHIPPED | OUTPATIENT
Start: 2022-12-08

## 2022-12-08 NOTE — PROGRESS NOTES
Subjective:     Encounter Date:12/08/2022      Patient ID: Aren Beltrán is a 72 y.o. male known coronary artery disease, inferior ST elevation myocardial infarction Jan 2021, type II diabetes mellitus with insulin use, hyperlipidemia, and obesity who presents the office today for routine follow up     Chief Complaint: Routine CAD follow up/ chest pain  Coronary Artery Disease  Presents for follow-up visit. Symptoms include chest pain and shortness of breath. Pertinent negatives include no chest tightness, dizziness, leg swelling, palpitations or weight gain. The symptoms have been worsening. Compliance with diet is good. Compliance with exercise is good. Compliance with medications is good.     Mr. Beltrán presents today for follow-up.     He has previously been seen due to complaints of chest tightness further evaluated with stress testing which was low risk.  He was started on ranolazine however stopped this medication after just 1 month and has had no recurrence of chest tightness so has been managing off of the medication.  He previously had been started on isosorbide mononitrate for complaints of chest pain however did not tolerate that medication due to side effects and hypotension.     He is followed in our office due to CAD with previous AMI in January of 2021.  He had PCI by Dr. Fitch and has done well up until recently.     He reports chest pain again today. He had previously had chest tightness which improved with ranexa that he has since stopped as referenced above. Recently he has been taking SL NTG for his chest pain that he reports improved his symptoms. He reports being under more stress recently as well. He has noted an increase in his blood glucose levels and at time feels that his has a change in his urine due to elevated protein.  He continues his other medications and has been compliant.  He has also reported flank pain intermittently.     He also reports dyspnea, worse with exertion,  improved with rest. He has noticed this of recent as well. He states that his chest discomfort is not as severe as what he experienced at the time of his AMI which is why he lends this to being related to stress more so than angina.    The following portions of the patient's history were reviewed and updated as appropriate: allergies, current medications, past family history, past medical history, past social history and past surgical history.     Allergies   Allergen Reactions   • Ozempic (0.25 Or 0.5 Mg-Dose) [Semaglutide(0.25 Or 0.5mg-Dos)] Diarrhea          Current Outpatient Medications:   •  Ascorbic Acid (VITAMIN C ADULT GUMMIES PO), Take 1 tablet by mouth Daily., Disp: , Rfl:   •  aspirin 81 MG EC tablet, Take 1 tablet by mouth Daily., Disp: 30 tablet, Rfl: 11  •  atorvastatin (LIPITOR) 20 MG tablet, TAKE 1 TABLET BY MOUTH EVERYDAY AT BEDTIME, Disp: 90 tablet, Rfl: 1  •  coenzyme Q10 100 MG capsule, Take 100 mg by mouth Daily., Disp: , Rfl:   •  Cyanocobalamin (VITAMIN B12 PO), Take 1 tablet by mouth Daily., Disp: , Rfl:   •  Ferrous Sulfate (IRON PO), Take  by mouth Daily., Disp: , Rfl:   •  glucose blood (Contour Next Test) test strip, Use as instructed, Disp: 200 each, Rfl: 0  •  insulin detemir (Levemir) 100 UNIT/ML injection, Inject 30 Units under the skin into the appropriate area as directed Every Night., Disp: 10 mL, Rfl: 5  •  insulin lispro (HumaLOG) 100 UNIT/ML injection, Inject 5 Units under the skin into the appropriate area as directed 3 (Three) Times a Day Before Meals., Disp: 45 mL, Rfl: 5  •  lisinopril (PRINIVIL,ZESTRIL) 2.5 MG tablet, Take 1 tablet by mouth Daily., Disp: 30 tablet, Rfl: 5  •  metFORMIN (GLUCOPHAGE) 500 MG tablet, TAKE 1 TABLET BY MOUTH TWICE A DAY WITH MEALS, Disp: 180 tablet, Rfl: 1  •  metoprolol tartrate (LOPRESSOR) 25 MG tablet, TAKE 1 TABLET BY MOUTH EVERY 12 HOURS, Disp: 180 tablet, Rfl: 1  •  Microlet Lancets misc, USE TO CHECK BLOOD SUGAR 4 TIMES A DAY BEFORE MEALS  "AND AT BEDTIME *INS ONLY PAY FOR 30 DAY SUPPLY*, Disp: 200 each, Rfl: 11  •  nitroglycerin (NITROSTAT) 0.4 MG SL tablet, Place 1 tablet under the tongue Every 5 (Five) Minutes As Needed for Chest Pain. Take no more than 3 doses in 15 minutes., Disp: 25 tablet, Rfl: 2  •  pantoprazole (PROTONIX) 40 MG EC tablet, Take 1 tablet by mouth Daily., Disp: 30 tablet, Rfl: 11  •  ranolazine (Ranexa) 500 MG 12 hr tablet, Take 1 tablet by mouth 2 (Two) Times a Day., Disp: 60 tablet, Rfl: 11     Review of Systems   Constitutional: Negative for diaphoresis, fever, malaise/fatigue, weight gain and weight loss.   HENT: Positive for hearing loss.    Cardiovascular: Positive for chest pain and dyspnea on exertion. Negative for leg swelling, orthopnea, palpitations, paroxysmal nocturnal dyspnea and syncope.   Respiratory: Positive for shortness of breath. Negative for chest tightness and wheezing.    Hematologic/Lymphatic: Negative for bleeding problem. Does not bruise/bleed easily.   Musculoskeletal: Negative for falls.   Gastrointestinal: Negative for bloating, abdominal pain, nausea and vomiting.   Genitourinary: Positive for flank pain.   Neurological: Negative for dizziness and light-headedness.   Psychiatric/Behavioral: Negative for altered mental status.       ECG 12 Lead    Date/Time: 12/8/2022 1:33 PM  Performed by: Nany Murray APRN  Authorized by: Nany Murray APRN   Comparison: compared with previous ECG from 6/8/2022  Similar to previous ECG  Rhythm: sinus bradycardia  Rate: bradycardic  BPM: 54  Conduction: 1st degree AV block  QRS axis: normal  Other findings: non-specific ST-T wave changes    Clinical impression: abnormal EKG            /80   Pulse 57   Ht 175.3 cm (69\")   Wt 96.6 kg (213 lb)   SpO2 99%   BMI 31.45 kg/m²        Objective:     Vitals reviewed.   Constitutional:       General: Awake. Not in acute distress.     Appearance: Normal appearance. Well-developed, well-groomed and not in " distress. Obese. Chronically ill-appearing. Not diaphoretic.   Eyes:      General:         Right eye: No discharge.         Left eye: No discharge.   HENT:      Head: Normocephalic and atraumatic.    Mouth/Throat:      Pharynx: No oropharyngeal exudate.   Pulmonary:      Effort: Pulmonary effort is normal. No respiratory distress.      Breath sounds: Normal breath sounds. No wheezing. No rhonchi. No rales.   Chest:      Chest wall: Not tender to palpatation.   Cardiovascular:      Bradycardia present. Regular rhythm.      Murmurs: There is no murmur.      No gallop. No rub.   Edema:     Peripheral edema absent.   Abdominal:      General: There is no distension.      Palpations: Abdomen is soft.      Tenderness: There is no abdominal tenderness.   Musculoskeletal: Normal range of motion.      Cervical back: Normal range of motion and neck supple. Skin:     General: Skin is warm and dry.      Findings: No erythema.   Neurological:      Mental Status: Alert, oriented to person, place, and time and oriented to person, place and time.   Psychiatric:         Attention and Perception: Attention normal.         Mood and Affect: Mood normal.         Speech: Speech normal.         Behavior: Behavior normal. Behavior is cooperative.         Thought Content: Thought content normal.         Cognition and Memory: Cognition normal.         Judgment: Judgment normal.     Lab Review:   Interpretation Summary  Myocardial Perfusion Study   · Myocardial perfusion imaging indicates a normal myocardial perfusion study with no evidence of ischemia.  · Left ventricular ejection fraction is hyperdynamic (Calculated EF > 70%).  · Impressions are consistent with a low risk study.        Lab Results   Component Value Date    CHOL 113 02/25/2022    TRIG 69 02/25/2022    HDL 64 (H) 02/25/2022    LDL 35 02/25/2022         Results for orders placed during the hospital encounter of 04/01/22    Adult Transthoracic Echo Complete W/ Cont if Necessary  Per Protocol    Interpretation Summary  · Left ventricular ejection fraction appears to be 66 - 70%. Left ventricular systolic function is normal.  · Left ventricular diastolic function is consistent with (grade Ia w/high LAP) impaired relaxation.    Cardiac catheterization 1/1/2021:  Impression:  1.  Acute inferior STEMI secondary to distal right coronary artery/PL branch occlusion, now status post successful PCI to the culprit vessel with synergy drug-eluting stent, also with successful PCI to the ostial right coronary artery as outlined above with synergy drug-eluting stent.  2.  Moderate to severe stenoses in the left anterior descending artery and left circumflex systems as outlined above.  Results:     Selective Coronary Angiography:     Left Main Coronary Artery: The left main coronary artery arises from the left coronary cusp and bifurcates into the LAD and left circumflex arteries.  There is mild disease noted in left main coronary artery.     Left Anterior Descending Artery: The left anterior descending artery arises from the distal left main coronary artery and supplies a first diagonal branch that appears to have a 60% stenosis in the midportion of this branch.  Following this, there is an additional small caliber diffusely diseased diagonal branch.  Distal to this branch, the LAD is calcified and diffusely diseased with irregularities up to 60 to 70%.  The LAD then terminates by wrapping the apex.      Left Circumflex: The left circumflex artery arises from the distal left main artery and supplies a first obtuse marginal branch which appears to have a hazy calcified 50-60% stenosis in the proximal third of the vessel.  At the takeoff of this branch, there is also at least a 40-50% stenosis.  The circumflex, after the takeoff of the first obtuse marginal branch has an 80% stenosis.  There is then a second obtuse marginal branch which has 2 terminal branches.  These are small caliber branches, however  there is a 60-70% stenosis at the bifurcation of the terminal branches.  There is also an AV groove vessel that has mild disease.     Right Coronary Artery: The right coronary artery arises from the right coronary cusp and has a severe stenosis in the proximal third of the vessel, followed by an aneurysmal segment in the midportion the vessel as well as ectasia distally, along with heavy calcification throughout the course the vessel.  The posterior descending artery is free of any significant disease, however there is an acute occlusion of the PL branch system just after the posterior descending artery takeoff.      Assessment:          Diagnosis Plan   1. Coronary artery disease of native artery of native heart with stable angina pectoris ([facility])        2. Mixed hyperlipidemia        3. Type 2 diabetes mellitus with hyperglycemia, without long-term current use of insulin ([facility])        4. Class 1 obesity with alveolar hypoventilation, serious comorbidity, and body mass index (BMI) of 31.0 to 31.9 in adult ([facility])               Plan:      -Coronary artery disease: Chest pain. He reports intermittent chest pain and dyspnea on exertion since his last visit. Chest pain improves with SL NTG. He is off Ranexa previously prescribed for chest tightness then stopped on his own. He had been doing well but more recently has had complaints again. We discussed resuming antianginal and he is agreeable. We will follow up with symptoms he has known CAD with previous AMI. He remains on statin and aspirin.    -Hyperlipidemia: LDL goal of 70 or less given known CAD.  He is currently managed on atorvastatin.  Lipids are managed through his primary care office.  His most recent lipid panel is referenced above.  LDL well controlled at 35.    -Diabetes mellitus: Currently managed on insulin.  Managed through his primary care physician's office.  He monitors his glucose at home and reports that he has had a slight increase in his blood glucose  levels at home. He had ranged 100-110 and now notes checks are at 120-125.  He sees his PCP later this month.       -Obesity: BMI 31.45.  Routine exercise and low-sodium, cardiac diet recommended.      Resume Ranexa 500 mg BID - he is intolerant to Imdur.   Follow up in 3 months, sooner if worsening.       I attest that all portions of this note have been reviewed and updated to reflect the patient's current status.

## 2023-01-18 DIAGNOSIS — I25.118 CORONARY ARTERY DISEASE OF NATIVE ARTERY OF NATIVE HEART WITH STABLE ANGINA PECTORIS: Chronic | ICD-10-CM

## 2023-01-18 DIAGNOSIS — E11.65 TYPE 2 DIABETES MELLITUS WITH HYPERGLYCEMIA, WITHOUT LONG-TERM CURRENT USE OF INSULIN: ICD-10-CM

## 2023-01-18 RX ORDER — PANTOPRAZOLE SODIUM 40 MG/1
40 TABLET, DELAYED RELEASE ORAL DAILY
Qty: 30 TABLET | Refills: 11 | Status: SHIPPED | OUTPATIENT
Start: 2023-01-18

## 2023-01-18 RX ORDER — LISINOPRIL 2.5 MG/1
2.5 TABLET ORAL DAILY
Qty: 30 TABLET | Refills: 5 | Status: SHIPPED | OUTPATIENT
Start: 2023-01-18

## 2023-01-18 NOTE — TELEPHONE ENCOUNTER
Caller: Aren Beltrán MERRICK    Relationship: Self    Best call back number: [phone]    Requested Prescriptions:   Requested Prescriptions     Pending Prescriptions Disp Refills   • insulin lispro (HumaLOG) 100 UNIT/ML injection 45 mL 5     Sig: Inject 5 Units under the skin into the appropriate area as directed 3 (Three) Times a Day Before Meals.   • lisinopril (PRINIVIL,ZESTRIL) 2.5 MG tablet 30 tablet 5     Sig: Take 1 tablet by mouth Daily.   • pantoprazole (PROTONIX) 40 MG EC tablet 30 tablet 11     Sig: Take 1 tablet by mouth Daily.   • metFORMIN (GLUCOPHAGE) 500 MG tablet 180 tablet 1     Sig: Take 1 tablet by mouth 2 (Two) Times a Day With Meals.        Pharmacy where request should be sent: [facility]/PHARMACY #[id number] - [facility] [place] - [address] [place] [address] AT [facility] OF [facility] [phone] [facility] [phone]      Additional details provided by patient: PATIENT HAS MOVED TO [place] AND CHANGED HIS PHARMACY TO [facility] IN [place].    Does the patient have less than a 3 day supply:  [x] Yes  [] No    Would you like a call back once the refill request has been completed: [x] Yes [] No    If the office needs to give you a call back, can they leave a voicemail: [x] Yes [] No    Elva Doe Rep   01/18/23 09:48 CST

## 2023-02-01 DIAGNOSIS — E11.65 TYPE 2 DIABETES MELLITUS WITH HYPERGLYCEMIA, WITHOUT LONG-TERM CURRENT USE OF INSULIN: ICD-10-CM

## 2023-02-01 RX ORDER — INSULIN LISPRO 100 [IU]/ML
5 INJECTION, SOLUTION INTRAVENOUS; SUBCUTANEOUS
Qty: 10 ML | Refills: 23 | Status: SHIPPED | OUTPATIENT
Start: 2023-02-01

## 2023-03-04 DIAGNOSIS — I25.118 CORONARY ARTERY DISEASE OF NATIVE ARTERY OF NATIVE HEART WITH STABLE ANGINA PECTORIS: Chronic | ICD-10-CM

## 2023-03-06 RX ORDER — ATORVASTATIN CALCIUM 20 MG/1
20 TABLET, FILM COATED ORAL
Qty: 90 TABLET | Refills: 0 | Status: SHIPPED | OUTPATIENT
Start: 2023-03-06

## 2023-03-06 NOTE — TELEPHONE ENCOUNTER
Rx Refill Note  Requested Prescriptions     Pending Prescriptions Disp Refills   • atorvastatin (LIPITOR) 20 MG tablet [Pharmacy Med Name: ATORVASTATIN 20 MG TABLET] 90 tablet 1     Sig: Take 1 tablet by mouth every night at bedtime.      Last office visit with prescribing clinician: 4/20/2022   Last telemedicine visit with prescribing clinician: Visit date not found   Next office visit with prescribing clinician: Visit date not found                         Would you like a call back once the refill request has been completed: [] Yes [] No    If the office needs to give you a call back, can they leave a voicemail: [] Yes [] No    DURNA Whipple  03/06/23, 09:39 CST

## 2023-03-06 NOTE — TELEPHONE ENCOUNTER
Will you please check with patient and schedule for annual exam with Dr. Puentes? Our last encounter said he had moved to [place]. I will go ahead and refill for 3 months, just want to be sure he is still coming here.

## 2023-03-06 NOTE — TELEPHONE ENCOUNTER
PATIENT WAS CALLED, HE IS IN THE PROCESS OF FINDING A NEW PROVIDER THERE IN [place].   COULD PATIENT GET 1 REFILL?

## 2023-03-06 NOTE — TELEPHONE ENCOUNTER
PATIENT HAS RETURNED CALL, GIVEN MESSAGE AND STATED THAT HE HAS HAD TO MOVE TO [place] AND HE IS WAITING FOR AN APPOINTMENT TO GET IN WITH HIS NEW PCP.

## 2023-03-06 NOTE — TELEPHONE ENCOUNTER
PATIENT HAS RETURNED CALL, HE STATED THAT HE HAS MOVED TO [place] AND IS WAITING TO BE SEEN  BY HIS NEW PCP.  PATIENT IS ASKING THAT THIS METOPROLOL BE SENT AS WELL.  HE STATED THAT HE HAS BEEN  OUT OF IT FOR OVER A WEEK.

## 2023-05-31 DIAGNOSIS — I25.118 CORONARY ARTERY DISEASE OF NATIVE ARTERY OF NATIVE HEART WITH STABLE ANGINA PECTORIS: Chronic | ICD-10-CM

## 2023-05-31 RX ORDER — ATORVASTATIN CALCIUM 20 MG/1
TABLET, FILM COATED ORAL
Qty: 90 TABLET | Refills: 0 | OUTPATIENT
Start: 2023-05-31

## 2023-09-01 DIAGNOSIS — I25.118 CORONARY ARTERY DISEASE OF NATIVE ARTERY OF NATIVE HEART WITH STABLE ANGINA PECTORIS: Chronic | ICD-10-CM

## 2023-09-01 RX ORDER — LISINOPRIL 2.5 MG/1
TABLET ORAL
Qty: 90 TABLET | Refills: 1 | OUTPATIENT
Start: 2023-09-01

## 2023-09-01 NOTE — TELEPHONE ENCOUNTER
Rx Refill Note  Requested Prescriptions     Pending Prescriptions Disp Refills    lisinopril (PRINIVIL,ZESTRIL) 2.5 MG tablet [Pharmacy Med Name: LISINOPRIL 2.5 MG TABLET] 90 tablet 1     Sig: TAKE 1 TABLET BY MOUTH EVERY DAY    metFORMIN (GLUCOPHAGE) 500 MG tablet [Pharmacy Med Name: METFORMIN  MG TABLET] 180 tablet 1     Sig: TAKE 1 TABLET BY MOUTH TWICE A DAY WITH MEALS      Last office visit with prescribing clinician: 4/20/2022   Last telemedicine visit with prescribing clinician: Visit date not found   Next office visit with prescribing clinician: Visit date not found                         Would you like a call back once the refill request has been completed: [] Yes [] No    If the office needs to give you a call back, can they leave a voicemail: [] Yes [] No    Riccardo Wright MA  09/01/23, 09:02 CDT

## 2023-09-18 DIAGNOSIS — I25.118 CORONARY ARTERY DISEASE OF NATIVE ARTERY OF NATIVE HEART WITH STABLE ANGINA PECTORIS: Chronic | ICD-10-CM

## 2023-09-18 RX ORDER — LISINOPRIL 2.5 MG/1
TABLET ORAL
Qty: 90 TABLET | Refills: 1 | OUTPATIENT
Start: 2023-09-18

## 2023-09-18 NOTE — TELEPHONE ENCOUNTER
Rx Refill Note  Requested Prescriptions     Pending Prescriptions Disp Refills    lisinopril (PRINIVIL,ZESTRIL) 2.5 MG tablet [Pharmacy Med Name: LISINOPRIL 2.5 MG TABLET] 90 tablet 1     Sig: TAKE 1 TABLET BY MOUTH EVERY DAY    metFORMIN (GLUCOPHAGE) 500 MG tablet [Pharmacy Med Name: METFORMIN  MG TABLET] 180 tablet 1     Sig: TAKE 1 TABLET BY MOUTH TWICE A DAY WITH MEALS      Last office visit with prescribing clinician: 4/20/2022   Last telemedicine visit with prescribing clinician: Visit date not found   Next office visit with prescribing clinician: Visit date not found                         Would you like a call back once the refill request has been completed: [] Yes [] No    If the office needs to give you a call back, can they leave a voicemail: [] Yes [] No    Riccardo Wright MA  09/18/23, 08:28 CDT

## 2025-01-04 NOTE — TELEPHONE ENCOUNTER
Rx Refill Note  Requested Prescriptions     Pending Prescriptions Disp Refills   • metFORMIN (GLUCOPHAGE) 500 MG tablet [Pharmacy Med Name: METFORMIN  MG TABLET] 180 tablet 1     Sig: TAKE 1 TABLET BY MOUTH TWICE A DAY WITH MEALS   • metoprolol tartrate (LOPRESSOR) 25 MG tablet [Pharmacy Med Name: METOPROLOL TARTRATE 25 MG TAB] 180 tablet 1     Sig: TAKE 1 TABLET BY MOUTH EVERY 12 HOURS      Last office visit with prescribing clinician: 4/20/2022   Last telemedicine visit with prescribing clinician: Visit date not found   Next office visit with prescribing clinician: Visit date not found                         Would you like a call back once the refill request has been completed: [] Yes [] No    If the office needs to give you a call back, can they leave a voicemail: [] Yes [] No    DURAN Whipple  05/05/23, 09:07 CDT  
No

## 2025-06-02 ENCOUNTER — TELEPHONE (OUTPATIENT)
Dept: GASTROENTEROLOGY | Facility: CLINIC | Age: 75
End: 2025-06-02

## 2025-06-02 NOTE — TELEPHONE ENCOUNTER
I have sent 2 letters to pt re: recall endo and rec'd no response. I tried to call today to discuss with pt-was unable to reach them so I will send strike 3 letter to pt and noncompliant letter to PCP.

## (undated) DEVICE — SNAR POLYP SENSATION MICRO OVL 13 240X40

## (undated) DEVICE — DEV TORQ GW HOT/PINK

## (undated) DEVICE — CANN CO2/O2 NASL A/

## (undated) DEVICE — PINNACLE INTRODUCER SHEATH: Brand: PINNACLE

## (undated) DEVICE — KT VLV HEMO MAP ACC PLS LG/BORE MTL/INTRO

## (undated) DEVICE — MASK,OXYGEN,MED CONC,ADLT,7' TUB, UC: Brand: PENDING

## (undated) DEVICE — GLIDESHEATH SLENDER STAINLESS STEEL KIT: Brand: GLIDESHEATH SLENDER

## (undated) DEVICE — Device: Brand: MEDEX

## (undated) DEVICE — NC TREK CORONARY DILATATION CATHETER 3.5 MM X 12 MM / RAPID-EXCHANGE: Brand: NC TREK

## (undated) DEVICE — ST PRIM PUMP 20DRP 3VLV 127IN

## (undated) DEVICE — ELECTRD PAD DEFIB A/

## (undated) DEVICE — THE CHANNEL CLEANING BRUSH IS A NYLON FLEXI BRUSH ATTACHED TO A FLEXIBLE PLASTIC SHEATH DESIGNED TO SAFELY REMOVE DEBRIS FROM FLEXIBLE ENDOSCOPES.

## (undated) DEVICE — TR BAND RADIAL ARTERY COMPRESSION DEVICE: Brand: TR BAND

## (undated) DEVICE — 6F .070 JR 4 100CM: Brand: CORDIS

## (undated) DEVICE — PTCA DILATATION CATHETER: Brand: EMERGE™

## (undated) DEVICE — Device

## (undated) DEVICE — CONMED SCOPE SAVER BITE BLOCK, 20X27 MM: Brand: SCOPE SAVER

## (undated) DEVICE — HI-TORQUE WHISPER ES GUIDE WIRE .014 STRAIGHTTIP 3.0 CM X 190 CM: Brand: HI-TORQUE WHISPER

## (undated) DEVICE — INFLATION DEVICE: Brand: ENCORE™ 26

## (undated) DEVICE — INF TL MULIPACK FR6: Brand: INFINITI

## (undated) DEVICE — TBG SMPL FLTR LINE NASL 02/C02 A/ BX/100

## (undated) DEVICE — FRCP BIOP ENDO CAPTURAPRO SPK SERR 2.8MM 230CM

## (undated) DEVICE — SOLIDIFIER LIQUI LOC PLUS 2000CC

## (undated) DEVICE — GW STARTER FXD CORE J .035 3X260CM 3MM

## (undated) DEVICE — NC TREK CORONARY DILATATION CATHETER 2.5 MM X 12 MM / RAPID-EXCHANGE: Brand: NC TREK

## (undated) DEVICE — YANKAUER,BULB TIP WITH VENT: Brand: ARGYLE

## (undated) DEVICE — SENSR O2 OXIMAX FNGR A/ 18IN NONSTR

## (undated) DEVICE — Device: Brand: DEFENDO AIR/WATER/SUCTION AND BIOPSY VALVE

## (undated) DEVICE — CUFF,BP,DISP,1 TUBE,ADULT,HP: Brand: MEDLINE

## (undated) DEVICE — Device: Brand: PROWATER

## (undated) DEVICE — SOL IRR NACL 0.9PCT BT 1000ML

## (undated) DEVICE — THE SINGLE USE ETRAP – POLYP TRAP IS USED FOR SUCTION RETRIEVAL OF ENDOSCOPICALLY REMOVED POLYPS.: Brand: ETRAP

## (undated) DEVICE — CATH F6INF TL JL 4 100CM: Brand: INFINITI

## (undated) DEVICE — NC TREK™ CORONARY DILATATION CATHETER 3.25 MM X 8 MM / RAPID-EXCHANGE: Brand: NC TREK™

## (undated) DEVICE — SKIN PREP TRAY W/CHG: Brand: MEDLINE INDUSTRIES, INC.

## (undated) DEVICE — PK CATH CARD 30 CA/4

## (undated) DEVICE — GUIDELINER CATHETERS ARE INTENDED TO BE USED IN CONJUNCTION WITH GUIDE CATHETERS TO ACCESS DISCRETE REGIONS OF THE CORONARY AND/OR PERIPHERAL VASCULATURE, AND TO FACILITATE PLACEMENT OF INTERVENTIONAL DEVICES.: Brand: GUIDELINER® V3 CATHETER

## (undated) DEVICE — SOL NACL INJ 0.9PCT 50ML